# Patient Record
Sex: MALE | Race: WHITE | Employment: OTHER | URBAN - METROPOLITAN AREA
[De-identification: names, ages, dates, MRNs, and addresses within clinical notes are randomized per-mention and may not be internally consistent; named-entity substitution may affect disease eponyms.]

---

## 2017-05-30 ENCOUNTER — HOSPITAL ENCOUNTER (OUTPATIENT)
Dept: MRI IMAGING | Age: 72
Discharge: HOME | End: 2017-05-30
Attending: PSYCHIATRY & NEUROLOGY
Payer: MEDICARE

## 2017-05-30 VITALS — WEIGHT: 240 LBS | BODY MASS INDEX: 30.81 KG/M2

## 2017-05-30 DIAGNOSIS — M48.061 SPINAL STENOSIS, LUMBAR: ICD-10-CM

## 2017-05-30 LAB — CREAT BLD-MCNC: 1.1 MG/DL (ref 0.6–1)

## 2017-05-30 PROCEDURE — 72158 MRI LUMBAR SPINE W/O & W/DYE: CPT

## 2017-05-30 PROCEDURE — 82565 ASSAY OF CREATININE: CPT

## 2017-05-30 PROCEDURE — A9577 INJ MULTIHANCE: HCPCS

## 2017-05-30 PROCEDURE — 74011250636 HC RX REV CODE- 250/636

## 2017-05-30 RX ORDER — SODIUM CHLORIDE 0.9 % (FLUSH) 0.9 %
10 SYRINGE (ML) INJECTION
Status: COMPLETED | OUTPATIENT
Start: 2017-05-30 | End: 2017-05-30

## 2017-05-30 RX ADMIN — Medication 10 ML: at 12:05

## 2017-05-30 RX ADMIN — GADOBENATE DIMEGLUMINE 10 ML: 529 INJECTION, SOLUTION INTRAVENOUS at 12:05

## 2019-02-19 ENCOUNTER — HOSPITAL ENCOUNTER (OUTPATIENT)
Dept: SURGERY | Age: 74
Discharge: HOME | End: 2019-02-19
Payer: MEDICARE

## 2019-02-19 ENCOUNTER — HOSPITAL ENCOUNTER (OUTPATIENT)
Dept: PHYSICAL THERAPY | Age: 74
Discharge: HOME | End: 2019-02-19
Payer: MEDICARE

## 2019-02-19 VITALS
TEMPERATURE: 97.3 F | OXYGEN SATURATION: 96 % | WEIGHT: 240 LBS | HEART RATE: 75 BPM | HEIGHT: 75 IN | BODY MASS INDEX: 29.84 KG/M2 | DIASTOLIC BLOOD PRESSURE: 78 MMHG | SYSTOLIC BLOOD PRESSURE: 153 MMHG | RESPIRATION RATE: 16 BRPM

## 2019-02-19 LAB
ANION GAP SERPL CALC-SCNC: 5 MMOL/L
APPEARANCE UR: NORMAL
APTT PPP: 27.5 SEC (ref 24.7–39.8)
ATRIAL RATE: 69 BPM
BACTERIA SPEC CULT: NORMAL
BASOPHILS # BLD: 0 K/UL (ref 0–0.2)
BASOPHILS NFR BLD: 1 % (ref 0–2)
BILIRUB UR QL: NEGATIVE
BUN SERPL-MCNC: 20 MG/DL (ref 8–23)
CALCIUM SERPL-MCNC: 8.9 MG/DL (ref 8.3–10.4)
CALCULATED P AXIS, ECG09: 65 DEGREES
CALCULATED R AXIS, ECG10: -6 DEGREES
CALCULATED T AXIS, ECG11: 36 DEGREES
CHLORIDE SERPL-SCNC: 104 MMOL/L (ref 98–107)
CO2 SERPL-SCNC: 30 MMOL/L (ref 21–32)
COLOR UR: YELLOW
CREAT SERPL-MCNC: 1.32 MG/DL (ref 0.8–1.5)
DIAGNOSIS, 93000: NORMAL
DIFFERENTIAL METHOD BLD: ABNORMAL
EOSINOPHIL # BLD: 0.1 K/UL (ref 0–0.8)
EOSINOPHIL NFR BLD: 3 % (ref 0.5–7.8)
ERYTHROCYTE [DISTWIDTH] IN BLOOD BY AUTOMATED COUNT: 13.2 % (ref 11.9–14.6)
GLUCOSE SERPL-MCNC: 148 MG/DL (ref 65–100)
GLUCOSE UR STRIP.AUTO-MCNC: NEGATIVE MG/DL
HCT VFR BLD AUTO: 43.5 % (ref 41.1–50.3)
HGB BLD-MCNC: 14.6 G/DL (ref 13.6–17.2)
HGB UR QL STRIP: NEGATIVE
IMM GRANULOCYTES # BLD AUTO: 0 K/UL (ref 0–0.5)
IMM GRANULOCYTES NFR BLD AUTO: 1 % (ref 0–5)
INR PPP: 1
KETONES UR QL STRIP.AUTO: NEGATIVE MG/DL
LEUKOCYTE ESTERASE UR QL STRIP.AUTO: NEGATIVE
LYMPHOCYTES # BLD: 1 K/UL (ref 0.5–4.6)
LYMPHOCYTES NFR BLD: 19 % (ref 13–44)
MCH RBC QN AUTO: 30.2 PG (ref 26.1–32.9)
MCHC RBC AUTO-ENTMCNC: 33.6 G/DL (ref 31.4–35)
MCV RBC AUTO: 90.1 FL (ref 79.6–97.8)
MONOCYTES # BLD: 0.3 K/UL (ref 0.1–1.3)
MONOCYTES NFR BLD: 5 % (ref 4–12)
NEUTS SEG # BLD: 3.8 K/UL (ref 1.7–8.2)
NEUTS SEG NFR BLD: 71 % (ref 43–78)
NITRITE UR QL STRIP.AUTO: NEGATIVE
NRBC # BLD: 0 K/UL (ref 0–0.2)
P-R INTERVAL, ECG05: 192 MS
PH UR STRIP: 6.5 [PH] (ref 5–9)
PLATELET # BLD AUTO: 146 K/UL (ref 150–450)
PMV BLD AUTO: 10.7 FL (ref 9.4–12.3)
POTASSIUM SERPL-SCNC: 4 MMOL/L (ref 3.5–5.1)
PROT UR STRIP-MCNC: NEGATIVE MG/DL
PROTHROMBIN TIME: 13.2 SEC (ref 11.7–14.5)
Q-T INTERVAL, ECG07: 430 MS
QRS DURATION, ECG06: 134 MS
QTC CALCULATION (BEZET), ECG08: 460 MS
RBC # BLD AUTO: 4.83 M/UL (ref 4.23–5.6)
SERVICE CMNT-IMP: NORMAL
SODIUM SERPL-SCNC: 139 MMOL/L (ref 136–145)
SP GR UR REFRACTOMETRY: 1.02 (ref 1–1.02)
UROBILINOGEN UR QL STRIP.AUTO: 0.2 EU/DL (ref 0.2–1)
VENTRICULAR RATE, ECG03: 69 BPM
WBC # BLD AUTO: 5.3 K/UL (ref 4.3–11.1)

## 2019-02-19 PROCEDURE — 85730 THROMBOPLASTIN TIME PARTIAL: CPT

## 2019-02-19 PROCEDURE — 77030027138 HC INCENT SPIROMETER -A

## 2019-02-19 PROCEDURE — 85610 PROTHROMBIN TIME: CPT

## 2019-02-19 PROCEDURE — 93005 ELECTROCARDIOGRAM TRACING: CPT | Performed by: ANESTHESIOLOGY

## 2019-02-19 PROCEDURE — 36415 COLL VENOUS BLD VENIPUNCTURE: CPT

## 2019-02-19 PROCEDURE — 80048 BASIC METABOLIC PNL TOTAL CA: CPT

## 2019-02-19 PROCEDURE — 87641 MR-STAPH DNA AMP PROBE: CPT

## 2019-02-19 PROCEDURE — 81003 URINALYSIS AUTO W/O SCOPE: CPT

## 2019-02-19 PROCEDURE — 85025 COMPLETE CBC W/AUTO DIFF WBC: CPT

## 2019-02-19 PROCEDURE — 97161 PT EVAL LOW COMPLEX 20 MIN: CPT

## 2019-02-19 RX ORDER — CHOLECALCIFEROL TAB 125 MCG (5000 UNIT) 125 MCG
5000 TAB ORAL
COMMUNITY

## 2019-02-19 RX ORDER — DOCUSATE SODIUM 100 MG/1
100 CAPSULE, LIQUID FILLED ORAL DAILY
COMMUNITY

## 2019-02-19 RX ORDER — DICLOFENAC SODIUM 75 MG/1
75 TABLET, DELAYED RELEASE ORAL DAILY
COMMUNITY
End: 2019-03-08

## 2019-02-19 RX ORDER — CETIRIZINE HCL 10 MG
10 TABLET ORAL
COMMUNITY
End: 2019-05-24 | Stop reason: CLARIF

## 2019-02-19 NOTE — PERIOP NOTES
Patient verified name and . Order for consent was found in EHR and matches case posting; patient verified. right total knee arthroplasty Type 3 surgery, PAT Joint assessment complete. Labs per surgeon: cbc, bmp, UA, PT, PTT, MRSA nasal swab; results pending. Labs per anesthesia protocol: no additional lab work needed. EKG:Done today- within anesthesia guidelines. MRSA/MSSA swab collected; pharmacy to review and dose antibiotic as appropriate. Hibiclens and instructions to return bottle on DOS given per hospital policy. Patient provided with handouts including Guide to Surgery, Pain Management, Hand Hygiene, Blood Transfusion Education, and [facility] Anesthesia Brochure. Patient answered medical/surgical history questions at their best of ability. All prior to admission medications documented in [facility]. Original medication prescription bottle was visualized during patient appointment. Patient instructed to hold all vitamins 7 days prior to surgery and NSAIDS 5 days prior to surgery. Prescription medications to hold: diclofenac. Patient instructed to continue previous medications as prescribed prior to surgery and to take the following medications the day of surgery according to anesthesia guidelines with a small sip of water: flomax. Patient teach back successful and patient demonstrates knowledge of instruction.

## 2019-02-19 NOTE — PROGRESS NOTES
02/19/19 1200 Oxygen Therapy O2 Sat (%) 97 % Pulse via Oximetry 102 beats per minute O2 Device Room air Pre-Treatment Breath Sounds Bilateral Clear Pre FEV1 (liters) 3.1 liters % Predicted 83 Incentive Spirometry Treatment Actual Volume (ml) 2500 ml Initial respiratory Assessment completed with pt. Pt was interviewed and evaluated in Joint camp prior to surgery. Patient ID: 
Kandi Reina [id number] 
68 y.o. 
1945 Surgeon: Dr. Kennedy Elizabeth Date of Surgery: 3/6/2019 Procedure: Total Right Knee Arthroplasty Primary Care Physician: Ava Mckay [phone] Specialists:  
                    
          Pt instructed in the use of Incentive Spirometry. Pt instructed to bring Incentive Spirometer back on date of surgery & to start using Is upon return to pt room. Pt taught proper cough technique History of smoking:   NONE Quit date:        
 
Secondhand smoke:NONE Past procedures with Oxygen desaturation:DENIES Past Medical History:  
Diagnosis Date  Arthritis  Avascular necrosis ([address] right hip  BPH (benign prostatic hyperplasia)   
 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  
 Skin cancer   
 multiple bcc and scc removed  Snores Respiratory history:DENIES SOB Respiratory meds:  NA 
 
 
                                
FAMILY PRESENT:              
                                            NO 
 
                                   
 PAST SLEEP STUDY:                 DENIES 
HX OF GENEVIEVE:                                       DENIES 
                                
 
GENEVIEVE assessment: SLEEPS ON SIDE PHYSICAL EXAM Body mass index is 30 kg/m². Visit Vitals /78 (BP 1 Location: Right arm, BP Patient Position: At rest;Sitting) Pulse 75 Temp 97.3 °F (36.3 °C) Resp 16 Ht 6' 3\" (1.905 m) Wt 108.9 kg (240 lb) SpO2 96% BMI 30.00 kg/m² Neck circumference:  43.5    cm Loud snoring:        YES Witnessed apnea or wakening gasping or choking:,             DENIES, Awakens with headaches:                                                  DENIES Morning or daytime tiredness/ sleepiness:                    DENIES - NAPS 30 MIN TO 1 HOUR Dry mouth or sore throat in morning:              SOME Sharma stage:  4 SACS score:25 STOP/BAN 
 
                
Sleepiness Scale:  
 
Sitting and reading 0 Watching TV 1 Sitting inactive in a public place 0 As a passenger in a car for an hour without a break 0 Lying down to rest in the afternoon when circumstances Permits 1 Sitting and talking to someone 0 Sitting quietly after lunch without alcohol 1 In a car, while stopped for a few minutes 0 Total :  3 
          
CPAP:                       NONE 
 
 
 
 
 
     CONT SAT HS Referrals: 
 
Pt. Phone Number:

## 2019-02-19 NOTE — PERIOP NOTES
Lab results within anesthesia guidelines. Lab results sent to PCP per surgeon's request.  
 
 
Recent Results (from the past 12 hour(s)) CBC WITH AUTOMATED DIFF Collection Time: 02/19/19 11:55 AM  
Result Value Ref Range WBC 5.3 4.3 - 11.1 K/uL  
 RBC 4.83 4.23 - 5.6 M/uL  
 HGB 14.6 13.6 - 17.2 g/dL HCT 43.5 41.1 - 50.3 % MCV 90.1 79.6 - 97.8 FL  
 MCH 30.2 26.1 - 32.9 PG  
 MCHC 33.6 31.4 - 35.0 g/dL  
 RDW 13.2 11.9 - 14.6 % PLATELET 269 (L) 644 - 450 K/uL MPV 10.7 9.4 - 12.3 FL ABSOLUTE NRBC 0.00 0.0 - 0.2 K/uL  
 DF AUTOMATED NEUTROPHILS 71 43 - 78 % LYMPHOCYTES 19 13 - 44 % MONOCYTES 5 4.0 - 12.0 % EOSINOPHILS 3 0.5 - 7.8 % BASOPHILS 1 0.0 - 2.0 % IMMATURE GRANULOCYTES 1 0.0 - 5.0 %  
 ABS. NEUTROPHILS 3.8 1.7 - 8.2 K/UL  
 ABS. LYMPHOCYTES 1.0 0.5 - 4.6 K/UL  
 ABS. MONOCYTES 0.3 0.1 - 1.3 K/UL  
 ABS. EOSINOPHILS 0.1 0.0 - 0.8 K/UL  
 ABS. BASOPHILS 0.0 0.0 - 0.2 K/UL  
 ABS. IMM. GRANS. 0.0 0.0 - 0.5 K/UL METABOLIC PANEL, BASIC Collection Time: 02/19/19 11:55 AM  
Result Value Ref Range Sodium 139 136 - 145 mmol/L Potassium 4.0 3.5 - 5.1 mmol/L Chloride 104 98 - 107 mmol/L  
 CO2 30 21 - 32 mmol/L Anion gap 5 mmol/L Glucose 148 (H) 65 - 100 mg/dL BUN 20 8 - 23 MG/DL Creatinine 1.32 0.8 - 1.5 MG/DL  
 GFR est AA >60 >60 ml/min/1.73m2 GFR est non-AA 56 ml/min/1.73m2 Calcium 8.9 8.3 - 10.4 MG/DL PROTHROMBIN TIME + INR Collection Time: 02/19/19 11:55 AM  
Result Value Ref Range Prothrombin time 13.2 11.7 - 14.5 sec INR 1.0    
PTT Collection Time: 02/19/19 11:55 AM  
Result Value Ref Range aPTT 27.5 24.7 - 39.8 SEC URINALYSIS W/ RFLX MICROSCOPIC Collection Time: 02/19/19 11:55 AM  
Result Value Ref Range Color YELLOW Appearance CLOUDY Specific gravity 1.017 1.001 - 1.023    
 pH (UA) 6.5 5.0 - 9.0 Protein NEGATIVE  NEG mg/dL Glucose NEGATIVE  mg/dL Ketone NEGATIVE  NEG mg/dL Bilirubin NEGATIVE  NEG Blood NEGATIVE  NEG Urobilinogen 0.2 0.2 - 1.0 EU/dL Nitrites NEGATIVE  NEG Leukocyte Esterase NEGATIVE  NEG    
EKG, 12 LEAD, INITIAL Collection Time: 02/19/19  2:28 PM  
Result Value Ref Range Ventricular Rate 69 BPM  
 Atrial Rate 69 BPM  
 P-R Interval 192 ms QRS Duration 134 ms Q-T Interval 430 ms QTC Calculation (Bezet) 460 ms Calculated P Axis 65 degrees Calculated R Axis -6 degrees Calculated T Axis 36 degrees Diagnosis Normal sinus rhythm Right bundle branch block Abnormal ECG No previous ECGs available

## 2019-02-19 NOTE — PROGRESS NOTES
Liza Francis : (67 y.o.) [address] at [facility] [address], [address] [phone] Physical Therapy Prehab Plan of Treatment and Evaluation Summary:2019 ICD-10: Treatment Diagnosis:  
· Pain in Right Knee (M25.561) · Stiffness of Right Knee, Not elsewhere classified (M25.661) · Difficulty in walking, Not elsewhere classified (R26.2) Precautions/Allergies:  
Adhesive  MEDICAL/REFERRING DIAGNOSIS: 
Unilateral primary osteoarthritis, right knee [M17.11] REFERRING PHYSICIAN: Betty Bullock., * DATE OF SURGERY: 3/6/19 Assessment:  
Comments:  Patient presents prior to R TKA. Patient is needing a L TKA as well. He has had a R JEREMIAS. Patient ambulates with a cane. He notes pain only with standing/walking but none with sitting or sleeping. Patient reports Dr. Tatianna Drake would like for him to go home and he is ok with going home but his spouse suggested he go to a SNF. Patient reports he will go to a SNF if his spouse asks him to. He was given a list of facilities and explained he could cancel plans if he and his spouse felt comfortable. PROBLEM LIST (Impacting functional limitations): Mr. Gurvinder Gunn presents with the following right lower extremity(s) problems: 
1. Gait 2. Strength 3. Range of Motion 4. Balance 5. Home Exercise Program 
6. Pain INTERVENTIONS PLANNED: 
1. Home Exercise Program 
2. Educational Discussion TREATMENT PLAN: Effective Dates: 2019 TO 2019. Frequency/Duration: Patient to continue to perform home exercise program at least twice per day up until his surgery. GOALS: (Goals have been discussed and agreed upon with patient.) Discharge Goals: Time Frame: 1 Day 1.  Patient will demonstrate independence with a home exercise program designed to increase strength, range of motion, balance, coordination and pain control to minimize functional deficits and optimize patient for total joint replacement. Rehabilitation Potential For Stated Goals: Good Regarding Alem Yuan's therapy, I certify that the treatment plan above will be carried out by a therapist or under their direction. Thank you for this referral, 
Deon Rodriguez, PT     
    
 
 
HISTORY:  
Present Symptoms: 
Pain Intensity 1: 0(sitting; 8/10 with walking) History of Present Injury/Illness (Reason for Referral): 
Medical/Referring Diagnosis: Unilateral primary osteoarthritis, right knee [M17.11] Past Medical History/Comorbidities: Mr. Cheryl Christianson  has a past medical history of Arthritis, Avascular necrosis ([facility] [address] BPH (benign prostatic hyperplasia), Hypertension, Impotence of organic origin, Nocturia, Osteoarthritis of hip (10/19/2011), S/P prosthetic total arthroplasty of the hip (10/19/2011), and Skin cancer. Mr. Cheryl Christianson  has a past surgical history that includes hx tonsillectomy; hx other surgical (2002); hx hip replacement (10/2011); and hx other surgical. 
Social History/Living Environment:  
Home Environment: Private residence # Steps to Enter: 1 Rails to Enter: No 
One/Two Story Residence: One story Living Alone: No 
Support Systems: Spouse/Significant Other/Partner Patient Expects to be Discharged to[de-identified] (unknown-SNF vs home) Current DME Used/Available at Home: Cane, straight, Raised toilet seat, Shower chair, Walker, rolling Tub or Shower Type: Shower Work/Activity: 
Patient is retired. Ambulates with a cane. Dominant Side: LEFT Current Medications:  See Pre-assessment nursing note Number of Personal Factors/Comorbidities that affect the Plan of Care: 1-2: MODERATE COMPLEXITY EXAMINATION:  
ADLs (Current Functional Status):  
Ambulation: 
[] Independent 
[] Walk Indoors Only 
[] Walk Outdoors [x] Use Assistive Device [] Use Wheelchair Only Dressing: 
[x] Independent Requires Assistance from Someone for: 
[] Sock/Shoes 
[] Pants 
[] Everything Bathing/Showering:  
[x] Independent [] Requires Assistance from Someone 
[] Sponge Bath Only Household Activities: 
[] Routine house and yard work [x] Light Housework Only 
[] None Observation/Orthostatic Postural Assessment:  
Exceptions to WDLTrunk flexion ROM/Flexibility:  
Gross Assessment: Yes AROM: Generally decreased, functional(L LE) RLE Assessment RLE Assessment (WDL): Exceptions to WDL 
RLE AROM 
R Knee Flexion: 114 R Knee Extension: 5 Strength:  
Gross Assessment: Yes 
Strength: Generally decreased, functional(L LE (4/5 hip; 3- knee extension)) RLE Strength 
R Hip Flexion: 4+ 
R Knee Flexion: 4 
R Knee Extension: 3-  
Functional Mobility:   
Gross Assessment: Yes Coordination: Within functional limits Gait Description (WDL): Exceptions to [facility] Stand to Sit: Modified independent Sit to Stand: Modified independent Distance (ft): 500 Feet (ft) Ambulation - Level of Assistance: Modified independent Assistive Device: Cane, straight Base of Support: Center of gravity altered Speed/Jennie: Slow Step Length: Left shortened;Right shortened Gait Abnormalities: Antalgic Balance:   
Sitting: Intact Standing: Impaired Standing - Static: Good Standing - Dynamic : Fair Body Structures Involved: 1. Joints 2. Muscles Body Functions Affected: 1. Movement Related Activities and Participation Affected: 1. Mobility Number of elements that affect the Plan of Care: 4+: HIGH COMPLEXITY CLINICAL PRESENTATION:  
Presentation: Stable and uncomplicated: LOW COMPLEXITY CLINICAL DECISION MAKING:  
Outcome Measure: Tool Used: Lower Extremity Functional Scale (LEFS) Score:  Initial: 26/80 Most Recent: X/80 (Date: -- ) Interpretation of Score: 20 questions each scored on a 5 point scale with 0 representing \"extreme difficulty or unable to perform\" and 4 representing \"no difficulty\".   The lower the score, the greater the functional disability. 80/80 represents no disability. Minimal detectable change is 9 points. Medical Necessity:  
· Mr. Rupali Landaverde is expected to optimize his lower extremity strength and ROM in preparation for joint replacement surgery. Reason for Services/Other Comments: · Achieve baseline assesment of musculoskeletal system, functional mobility and home environment. , educate in PT HEP in preparation for surgery, educate in hospital plan of care. Use of outcome tool(s) and clinical judgement create a POC that gives a: Clear prediction of patient's progress: LOW COMPLEXITY  
TREATMENT:  
Treatment/Session Assessment:  Patient was instructed in PT- HEP to increase strength and ROM in LEs. Answered all questions. · Post session pain:  0/10 · Compliance with Program/Exercises: Will assess as treatment progresses. Total Treatment Duration: PT Patient Time In/Time Out Time In: 1130 Time Out: 1145 Munira Saini PT

## 2019-02-20 NOTE — ADVANCED PRACTICE NURSE
Total Joint Surgery Preoperative Chart Review Patient ID: 
Pan Trivedi [id number] 
17 y.o. 
1945 Surgeon: Dr. Dina Sullivan Date of Surgery: 3/6/2019 Procedure: Total Right Knee Arthroplasty Primary Care Physician: Criselda Sykes [phone] Specialty Physician(s):   
 
Subjective:  
Pan Trivedi is a 75 y.o. WHITE OR  male who presents for preoperative evaluation for Total Right Knee arthroplasty. This is a preoperative chart review note based on data collected by the nurse at the surgical Pre-Assessment visit. Past Medical History:  
Diagnosis Date  Arthritis  Avascular necrosis ([address] right hip  BPH (benign prostatic hyperplasia)   
 medication  CMT (Charcot-Leslie-Tooth disease) Followed by Dr. Richard St- neurology  Hypertension   
 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  
 Skin cancer   
 multiple bcc and scc removed  Snores Past Surgical History:  
Procedure Laterality Date  HX BACK SURGERY Lumbar laminectomy- no hardware  HX HIP REPLACEMENT Right 10/2011  
 total  
 HX OTHER SURGICAL  2002  
 skin cancer removed from lip, plastic surgery afterward  HX OTHER SURGICAL    
 skin cancer from the ear,left leg and behind the area  HX TONSILLECTOMY Family History Problem Relation Age of Onset [facility] Arthritis-osteo Mother  Cancer Father  Diabetes Father  Hypertension Other   
     gen fam hx Social History Tobacco Use  Smoking status: Never Smoker  Smokeless tobacco: Never Used Substance Use Topics  Alcohol use: No  
   
Prior to Admission medications Medication Sig Start Date End Date Taking?  Authorizing Provider  
cholecalciferol, VITAMIN D3, (VITAMIN D3) 5,000 unit tab tablet Take 5,000 Units by mouth every seven (7) days. Yes Provider, Historical  
docusate sodium (STOOL SOFTENER) 100 mg capsule Take 100 mg by mouth daily. Yes Provider, Historical  
diclofenac EC (VOLTAREN) 75 mg EC tablet Take 75 mg by mouth daily. Yes Provider, Historical  
cetirizine (ZYRTEC) 10 mg tablet Take 10 mg by mouth nightly. Yes Provider, Historical  
tamsulosin (FLOMAX) 0.4 mg capsule Take 0.4 mg by mouth daily. Yes Provider, Historical  
tadalafil (CIALIS) 5 mg tablet Take 1 Tab by mouth daily. Patient taking differently: Take 5 mg by mouth nightly. 1/22/19  Yes Abida Burrows MD  
finasteride (PROSCAR) 5 mg tablet Take 1 Tab by mouth daily for 90 days. Patient taking differently: Take 5 mg by mouth nightly. 1/22/19 4/22/19 Yes Abida Burrows MD  
finasteride (PROSCAR) 5 mg tablet Take 1 Tab by mouth daily for 90 days. Patient taking differently: Take 5 mg by mouth nightly. 1/22/19 4/22/19 Yes Abida Burrows MD  
terazosin (HYTRIN) 10 mg capsule Take 10 mg by mouth nightly. Yes Provider, Historical  
hydrochlorothiazide (HYDRODIURIL) 25 mg tablet Take 25 mg by mouth daily. Yes Provider, Historical  
tadalafil (CIALIS) 5 mg tablet Take 1 Tab by mouth daily. Patient taking differently: Take 5 mg by mouth nightly. 11/9/16   Franco López MD  
 
Allergies Allergen Reactions  Adhesive Rash Objective:  
 
Physical Exam:  
/78 (BP 1 Location: Right arm, BP Patient Position: At rest;Sitting) Pulse 75 Temp 97.3 °F (36.3 °C) Resp 16 Ht 6' 3\" (1.905 m) Wt 108.9 kg (240 lb) SpO2 96% BMI 30.00 kg/m²  
  
Neck circumference:  43.5 ECG:   
EKG Results Procedure 720 Value Units Date/Time EKG, 12 LEAD, INITIAL [[id number]] Collected:  02/19/19 1428 Order Status:  Completed Updated:  02/19/19 2732 Ventricular Rate 69 BPM   
  Atrial Rate 69 BPM   
  P-R Interval 192 ms QRS Duration 134 ms Q-T Interval 430 ms QTC Calculation (Bezet) 460 ms Calculated P Axis 65 degrees Calculated R Axis -6 degrees Calculated T Axis 36 degrees Diagnosis --  
  Normal sinus rhythm Right bundle branch block Abnormal ECG No previous ECGs available Confirmed by Yvan Montoya MD (), [id number] ([id number]) on 2/19/2019 5:12:38 PM 
  
  
 
 
Data Review:  
Labs:  
Results for Solange Patino \"KATH\" (MRN [id number]) as of 2/20/2019 15:46 Ref. Range 2/19/2019 11:55 Sodium Latest Ref Range: 136 - 145 mmol/L 139 Potassium Latest Ref Range: 3.5 - 5.1 mmol/L 4.0 Chloride Latest Ref Range: 98 - 107 mmol/L 104 CO2 Latest Ref Range: 21 - 32 mmol/L 30 Anion gap Latest Units: mmol/L 5 Glucose Latest Ref Range: 65 - 100 mg/dL 148 (H) BUN Latest Ref Range: 8 - 23 MG/DL 20 Creatinine Latest Ref Range: 0.8 - 1.5 MG/DL 1.32 Calcium Latest Ref Range: 8.3 - 10.4 MG/DL 8.9 GFR est non-AA Latest Units: ml/min/1.73m2 56 GFR est AA Latest Ref Range: >60 ml/min/1.73m2 >60 Problem List: 
) Patient Active Problem List  
Diagnosis Code  Osteoarthritis of hip M16.9  
 S/P prosthetic total arthroplasty of the hip Z96.649  
 HTN (hypertension) I10  
 BPH (benign prostatic hypertrophy) N40.0 Total Joint Surgery Pre-Assessment Recommendations:   
He/she is a moderate risk for sleep apnea but is not interested in additional work up at this time. Will initiate perioperative GENEVIEVE precautions. Recommend continuous saturation monitoring hours of sleep, during hospitalization. Signed By: Tarun Serrano NP-C February 20, 2019

## 2019-03-05 ENCOUNTER — ANESTHESIA EVENT (OUTPATIENT)
Dept: SURGERY | Age: 74
DRG: 470 | End: 2019-03-05
Payer: MEDICARE

## 2019-03-05 NOTE — H&P
[id number] [facility]  Pre Operative History and Physical Exam    Patient ID:  Tayla Hanna  [id number]  3 y.o.  1945    Today: March 5, 2019       Assessment:   1. Arthritis of the right knee        Plan:    1. Proceed with scheduled Procedure(s) (LRB):  KNEE ARTHROPLASTY TOTAL/ RIGHT/ CHRISTOPHER/ FNB (Right)            CC:  Right knee pain    HPI:   The patient has end stage arthritis of the right knee. The patient was evaluated and examined during a consultation prior to this office visit. There have been no changes to the patient's orthopedic condition since the initial consultation. The patient has failed previous conservative treatment for this condition including antiinflammatories , and lifestyle modifications. The necessity for joint replacement is present. The patient will be admitted the day of surgery for Procedure(s) (LRB):  KNEE ARTHROPLASTY TOTAL/ RIGHT/ CHRISTOPHER/ FNB (Right)      Past Medical/Surgical History:  Past Medical History:   Diagnosis Date    Arthritis     Avascular necrosis ([address]     right hip    BPH (benign prostatic hyperplasia)     medication    CMT (Charcot-Leslie-Tooth disease)     Followed by Dr. Lily Esparza- neurology     Hypertension     controlled with medication    Impotence of organic origin     Nocturia     Osteoarthritis of hip 10/19/2011    S/P prosthetic total arthroplasty of the hip 10/19/2011    Skin cancer     multiple bcc and scc removed     Snores      Past Surgical History:   Procedure Laterality Date    HX BACK SURGERY      Lumbar laminectomy- no hardware     HX HIP REPLACEMENT Right 10/2011    total    HX OTHER SURGICAL  2002    skin cancer removed from lip, plastic surgery afterward    HX OTHER SURGICAL      skin cancer from the ear,left leg and behind the area    HX TONSILLECTOMY          Allergies:    Allergies   Allergen Reactions    Adhesive Rash        Physical Exam:   General: NAD, Alert, Oriented, Appears their stated age HEENT: NC/AT, PERRL    Skin: No rashes, lesions or wounds seen      Psych: normal affect      Heart: Regular Rate, Rhythm     Lungs: unlabored respirations, normal breath sounds     Abdomen: Soft and non-distended     Ortho: Pain with limited ROM of the right knee    Neuro: no focal defects, sensation is equal bilaterally     Lymph: no lymphadenopathy     Meds:   No current facility-administered medications for this encounter. Current Outpatient Medications   Medication Sig    cholecalciferol, VITAMIN D3, (VITAMIN D3) 5,000 unit tab tablet Take 5,000 Units by mouth every seven (7) days.  docusate sodium (STOOL SOFTENER) 100 mg capsule Take 100 mg by mouth daily.  diclofenac EC (VOLTAREN) 75 mg EC tablet Take 75 mg by mouth daily.  cetirizine (ZYRTEC) 10 mg tablet Take 10 mg by mouth nightly.  tamsulosin (FLOMAX) 0.4 mg capsule Take 0.4 mg by mouth daily.  tadalafil (CIALIS) 5 mg tablet Take 1 Tab by mouth daily. (Patient taking differently: Take 5 mg by mouth nightly.)    finasteride (PROSCAR) 5 mg tablet Take 1 Tab by mouth daily for 90 days. (Patient taking differently: Take 5 mg by mouth nightly.)    finasteride (PROSCAR) 5 mg tablet Take 1 Tab by mouth daily for 90 days. (Patient taking differently: Take 5 mg by mouth nightly.)    tadalafil (CIALIS) 5 mg tablet Take 1 Tab by mouth daily. (Patient taking differently: Take 5 mg by mouth nightly.)    terazosin (HYTRIN) 10 mg capsule Take 10 mg by mouth nightly.  hydrochlorothiazide (HYDRODIURIL) 25 mg tablet Take 25 mg by mouth daily.          Labs:  Hospital Outpatient Visit on 02/19/2019   Component Date Value Ref Range Status    WBC 02/19/2019 5.3  4.3 - 11.1 K/uL Final    RBC 02/19/2019 4.83  4.23 - 5.6 M/uL Final    HGB 02/19/2019 14.6  13.6 - 17.2 g/dL Final    HCT 02/19/2019 43.5  41.1 - 50.3 % Final    MCV 02/19/2019 90.1  79.6 - 97.8 FL Final    MCH 02/19/2019 30.2  26.1 - 32.9 PG Final    MCHC 02/19/2019 33.6  31.4 - 35.0 g/dL Final    RDW 02/19/2019 13.2  11.9 - 14.6 % Final    PLATELET 84/52/2891 819* 150 - 450 K/uL Final    MPV 02/19/2019 10.7  9.4 - 12.3 FL Final    ABSOLUTE NRBC 02/19/2019 0.00  0.0 - 0.2 K/uL Final    **Note: Absolute NRBC parameter is now reported with Hemogram**    DF 02/19/2019 AUTOMATED    Final    NEUTROPHILS 02/19/2019 71  43 - 78 % Final    LYMPHOCYTES 02/19/2019 19  13 - 44 % Final    MONOCYTES 02/19/2019 5  4.0 - 12.0 % Final    EOSINOPHILS 02/19/2019 3  0.5 - 7.8 % Final    BASOPHILS 02/19/2019 1  0.0 - 2.0 % Final    IMMATURE GRANULOCYTES 02/19/2019 1  0.0 - 5.0 % Final    ABS. NEUTROPHILS 02/19/2019 3.8  1.7 - 8.2 K/UL Final    ABS. LYMPHOCYTES 02/19/2019 1.0  0.5 - 4.6 K/UL Final    ABS. MONOCYTES 02/19/2019 0.3  0.1 - 1.3 K/UL Final    ABS. EOSINOPHILS 02/19/2019 0.1  0.0 - 0.8 K/UL Final    ABS. BASOPHILS 02/19/2019 0.0  0.0 - 0.2 K/UL Final    ABS. IMM. GRANS. 02/19/2019 0.0  0.0 - 0.5 K/UL Final    Sodium 02/19/2019 139  136 - 145 mmol/L Final    Potassium 02/19/2019 4.0  3.5 - 5.1 mmol/L Final    Chloride 02/19/2019 104  98 - 107 mmol/L Final    CO2 02/19/2019 30  21 - 32 mmol/L Final    Anion gap 02/19/2019 5  mmol/L Final    Glucose 02/19/2019 148* 65 - 100 mg/dL Final    Comment: 47 - 60 mg/dl Consistent with, but not fully diagnostic of hypoglycemia. 101 - 125 mg/dl Impaired fasting glucose/consistent with pre-diabetes mellitus  > 126 mg/dl Fasting glucose consistent with overt diabetes mellitus      BUN 02/19/2019 20  8 - 23 MG/DL Final    Creatinine 02/19/2019 1.32  0.8 - 1.5 MG/DL Final    GFR est AA 02/19/2019 >60  >60 ml/min/1.73m2 Final    GFR est non-AA 02/19/2019 56  ml/min/1.73m2 Final    Comment: (NOTE)  Estimated GFR is calculated using the Modification of Diet in Renal   Disease (MDRD) Study equation, reported for both  Americans   (GFRAA) and non- Americans (GFRNA), and normalized to 1.73m2   body surface area.  The physician must decide which value applies to   the patient. The MDRD study equation should only be used in   individuals age 25 or older. It has not been validated for the   following: pregnant women, patients with serious comorbid conditions,   or on certain medications, or persons with extremes of body size,   muscle mass, or nutritional status.  Calcium 02/19/2019 8.9  8.3 - 10.4 MG/DL Final    Special Requests: 02/19/2019 NO SPECIAL REQUESTS    Final    Culture result: 02/19/2019 SA target not detected. A MRSA NEGATIVE, SA NEGATIVE test result does not preclude MRSA or SA nasal colonization.     Final    Prothrombin time 02/19/2019 13.2  11.7 - 14.5 sec Final    INR 02/19/2019 1.0    Final    Comment: Suggested therapeutic INR range:  Venous thrombosis and embolus  2.0-3.0  Prosthetic heart valve         2.5-3.5  ** Note new reference range and method **      aPTT 02/19/2019 27.5  24.7 - 39.8 SEC Final    Comment: Heparin Therapeutic Range = 74 - 123 seconds  In addition to factor deficiency, monitoring heparin therapy, etc., evaluation of a prolonged aPTT result should include consideration of preanalytic variables such as heparin flush contamination, specimen integrity issues, etc.      Color 02/19/2019 YELLOW    Final    Appearance 02/19/2019 CLOUDY    Final    Specific gravity 02/19/2019 1.017  1.001 - 1.023   Final    pH (UA) 02/19/2019 6.5  5.0 - 9.0   Final    Protein 02/19/2019 NEGATIVE   NEG mg/dL Final    Glucose 02/19/2019 NEGATIVE   mg/dL Final    Ketone 02/19/2019 NEGATIVE   NEG mg/dL Final    Bilirubin 02/19/2019 NEGATIVE   NEG   Final    Blood 02/19/2019 NEGATIVE   NEG   Final    Urobilinogen 02/19/2019 0.2  0.2 - 1.0 EU/dL Final    Nitrites 02/19/2019 NEGATIVE   NEG   Final    Leukocyte Esterase 02/19/2019 NEGATIVE   NEG   Final    Ventricular Rate 02/19/2019 69  BPM Final    Atrial Rate 02/19/2019 69  BPM Final    P-R Interval 02/19/2019 192  ms Final    QRS Duration 02/19/2019 134  ms Final    Q-T Interval 02/19/2019 430  ms Final    QTC Calculation (Bezet) 02/19/2019 460  ms Final    Calculated P Axis 02/19/2019 65  degrees Final    Calculated R Axis 02/19/2019 -6  degrees Final    Calculated T Axis 02/19/2019 36  degrees Final    Diagnosis 02/19/2019    Final                    Value:Normal sinus rhythm  Right bundle branch block  Abnormal ECG  No previous ECGs available  Confirmed by Jonatan Washburn MD (), BOY LOZADA ([id number]) on 2/19/2019 5:12:38 PM     Office Visit on 01/22/2019   Component Date Value Ref Range Status    Glucose (UA POC) 01/22/2019 Negative  Negative mg/dL Final    Bilirubin (UA POC) 01/22/2019 Negative  Negative Final    Ketones (UA POC) 01/22/2019 Trace  Negative Final    Specific gravity (UA POC) 01/22/2019 1.025  1.001 - 1.035 Final    Blood (UA POC) 01/22/2019 Negative  Negative Final    pH (UA POC) 01/22/2019 6.0  4.6 - 8.0 Final    Protein (UA POC) 01/22/2019 Trace  Negative Final    Urobilinogen (POC) 01/22/2019 0.2 mg/dL   Final    Nitrites (UA POC) 01/22/2019 Negative  Negative Final    Leukocyte esterase (UA POC) 01/22/2019 Negative  Negative Final                 Patient Active Problem List   Diagnosis Code    Osteoarthritis of hip M16.9    S/P prosthetic total arthroplasty of the hip Z96.649    HTN (hypertension) I10    BPH (benign prostatic hypertrophy) N40.0         Signed By: FRANSISCO Crisostomo  March 5, 2019

## 2019-03-06 ENCOUNTER — HOSPITAL ENCOUNTER (INPATIENT)
Age: 74
LOS: 2 days | Discharge: HOME HEALTH | DRG: 470 | End: 2019-03-08
Attending: ORTHOPAEDIC SURGERY | Admitting: ORTHOPAEDIC SURGERY
Payer: MEDICARE

## 2019-03-06 ENCOUNTER — ANESTHESIA (OUTPATIENT)
Dept: SURGERY | Age: 74
DRG: 470 | End: 2019-03-06
Payer: MEDICARE

## 2019-03-06 ENCOUNTER — HOME HEALTH ADMISSION (OUTPATIENT)
Dept: HOME HEALTH SERVICES | Facility: HOME HEALTH | Age: 74
End: 2019-03-06
Payer: MEDICARE

## 2019-03-06 DIAGNOSIS — I10 ESSENTIAL HYPERTENSION: ICD-10-CM

## 2019-03-06 DIAGNOSIS — M17.11 PRIMARY OSTEOARTHRITIS OF RIGHT KNEE: ICD-10-CM

## 2019-03-06 DIAGNOSIS — Z96.651 STATUS POST TOTAL KNEE REPLACEMENT, RIGHT: Primary | ICD-10-CM

## 2019-03-06 PROBLEM — M19.90 OSTEOARTHRITIS: Status: ACTIVE | Noted: 2019-03-06

## 2019-03-06 LAB
ABO + RH BLD: NORMAL
BLOOD GROUP ANTIBODIES SERPL: NORMAL
GLUCOSE BLD STRIP.AUTO-MCNC: 125 MG/DL (ref 65–100)
HGB BLD-MCNC: 12.9 G/DL (ref 13.6–17.2)
SPECIMEN EXP DATE BLD: NORMAL

## 2019-03-06 PROCEDURE — 76010010054 HC POST OP PAIN BLOCK: Performed by: ORTHOPAEDIC SURGERY

## 2019-03-06 PROCEDURE — 77030002966 HC SUT PDS J&J -A: Performed by: ORTHOPAEDIC SURGERY

## 2019-03-06 PROCEDURE — 74011000258 HC RX REV CODE- 258: Performed by: ORTHOPAEDIC SURGERY

## 2019-03-06 PROCEDURE — 82962 GLUCOSE BLOOD TEST: CPT

## 2019-03-06 PROCEDURE — 77030003665 HC NDL SPN BBMI -A: Performed by: ANESTHESIOLOGY

## 2019-03-06 PROCEDURE — 74011250636 HC RX REV CODE- 250/636: Performed by: ANESTHESIOLOGY

## 2019-03-06 PROCEDURE — 77030037364 HC TIB INST CR  DISP STRY -C: Performed by: ORTHOPAEDIC SURGERY

## 2019-03-06 PROCEDURE — 65270000029 HC RM PRIVATE

## 2019-03-06 PROCEDURE — 77030003602 HC NDL NRV BLK BBMI -B: Performed by: ANESTHESIOLOGY

## 2019-03-06 PROCEDURE — 86900 BLOOD TYPING SEROLOGIC ABO: CPT

## 2019-03-06 PROCEDURE — 0SRC0JA REPLACEMENT OF RIGHT KNEE JOINT WITH SYNTHETIC SUBSTITUTE, UNCEMENTED, OPEN APPROACH: ICD-10-PCS | Performed by: ORTHOPAEDIC SURGERY

## 2019-03-06 PROCEDURE — 74011000250 HC RX REV CODE- 250: Performed by: ORTHOPAEDIC SURGERY

## 2019-03-06 PROCEDURE — 77030035236 HC SUT PDS STRATFX BARB J&J -B: Performed by: ORTHOPAEDIC SURGERY

## 2019-03-06 PROCEDURE — 76010000171 HC OR TIME 2 TO 2.5 HR INTENSV-TIER 1: Performed by: ORTHOPAEDIC SURGERY

## 2019-03-06 PROCEDURE — 74011000250 HC RX REV CODE- 250

## 2019-03-06 PROCEDURE — 77030007880 HC KT SPN EPDRL BBMI -B: Performed by: ANESTHESIOLOGY

## 2019-03-06 PROCEDURE — 74011250636 HC RX REV CODE- 250/636: Performed by: ORTHOPAEDIC SURGERY

## 2019-03-06 PROCEDURE — 76060000035 HC ANESTHESIA 2 TO 2.5 HR: Performed by: ORTHOPAEDIC SURGERY

## 2019-03-06 PROCEDURE — 77030032490 HC SLV COMPR SCD KNE COVD -B

## 2019-03-06 PROCEDURE — 77030019557 HC ELECTRD VES SEAL MEDT -F: Performed by: ORTHOPAEDIC SURGERY

## 2019-03-06 PROCEDURE — 77030006720 HC BLD PAT RMR ZIMM -B: Performed by: ORTHOPAEDIC SURGERY

## 2019-03-06 PROCEDURE — C1776 JOINT DEVICE (IMPLANTABLE): HCPCS | Performed by: ORTHOPAEDIC SURGERY

## 2019-03-06 PROCEDURE — 94762 N-INVAS EAR/PLS OXIMTRY CONT: CPT

## 2019-03-06 PROCEDURE — 77030018673: Performed by: ORTHOPAEDIC SURGERY

## 2019-03-06 PROCEDURE — 77030008467 HC STPLR SKN COVD -B: Performed by: ORTHOPAEDIC SURGERY

## 2019-03-06 PROCEDURE — 74011250636 HC RX REV CODE- 250/636: Performed by: PHYSICIAN ASSISTANT

## 2019-03-06 PROCEDURE — 36415 COLL VENOUS BLD VENIPUNCTURE: CPT

## 2019-03-06 PROCEDURE — 86580 TB INTRADERMAL TEST: CPT | Performed by: ORTHOPAEDIC SURGERY

## 2019-03-06 PROCEDURE — 77030020263 HC SOL INJ SOD CL0.9% LFCR 1000ML

## 2019-03-06 PROCEDURE — 77030037363 HC FEM INST CR  DISP STRY -C: Performed by: ORTHOPAEDIC SURGERY

## 2019-03-06 PROCEDURE — 77030002912 HC SUT ETHBND J&J -A: Performed by: ORTHOPAEDIC SURGERY

## 2019-03-06 PROCEDURE — 77030013708 HC HNDPC SUC IRR PULS STRY –B: Performed by: ORTHOPAEDIC SURGERY

## 2019-03-06 PROCEDURE — 76942 ECHO GUIDE FOR BIOPSY: CPT | Performed by: ORTHOPAEDIC SURGERY

## 2019-03-06 PROCEDURE — 77030035643 HC BLD SAW OSC PRECIS STRY -C: Performed by: ORTHOPAEDIC SURGERY

## 2019-03-06 PROCEDURE — 85018 HEMOGLOBIN: CPT

## 2019-03-06 PROCEDURE — 77030031139 HC SUT VCRL2 J&J -A: Performed by: ORTHOPAEDIC SURGERY

## 2019-03-06 PROCEDURE — 74011250637 HC RX REV CODE- 250/637: Performed by: PHYSICIAN ASSISTANT

## 2019-03-06 PROCEDURE — 77030034849: Performed by: ORTHOPAEDIC SURGERY

## 2019-03-06 PROCEDURE — 77030012935 HC DRSG AQUACEL BMS -B: Performed by: ORTHOPAEDIC SURGERY

## 2019-03-06 PROCEDURE — 99221 1ST HOSP IP/OBS SF/LOW 40: CPT | Performed by: PHYSICAL MEDICINE & REHABILITATION

## 2019-03-06 PROCEDURE — 77030018836 HC SOL IRR NACL ICUM -A: Performed by: ORTHOPAEDIC SURGERY

## 2019-03-06 PROCEDURE — 94760 N-INVAS EAR/PLS OXIMETRY 1: CPT

## 2019-03-06 PROCEDURE — 76210000016 HC OR PH I REC 1 TO 1.5 HR: Performed by: ORTHOPAEDIC SURGERY

## 2019-03-06 PROCEDURE — 74011000302 HC RX REV CODE- 302: Performed by: ORTHOPAEDIC SURGERY

## 2019-03-06 PROCEDURE — 77030020782 HC GWN BAIR PAWS FLX 3M -B: Performed by: ANESTHESIOLOGY

## 2019-03-06 PROCEDURE — 74011250636 HC RX REV CODE- 250/636

## 2019-03-06 DEVICE — POSTERIOR STABILIZED FEMORAL
Type: IMPLANTABLE DEVICE | Site: KNEE | Status: FUNCTIONAL
Brand: TRIATHLON

## 2019-03-06 DEVICE — TIBIAL BEARING INSERT
Type: IMPLANTABLE DEVICE | Site: KNEE | Status: FUNCTIONAL
Brand: TRIATHLON

## 2019-03-06 DEVICE — TIBIAL COMPONENT
Type: IMPLANTABLE DEVICE | Site: KNEE | Status: FUNCTIONAL
Brand: TRIATHLON

## 2019-03-06 DEVICE — PATELLA
Type: IMPLANTABLE DEVICE | Site: KNEE | Status: FUNCTIONAL
Brand: TRIATHLON

## 2019-03-06 RX ORDER — ASPIRIN 81 MG/1
81 TABLET ORAL EVERY 12 HOURS
Qty: 60 TAB | Refills: 1 | Status: SHIPPED | OUTPATIENT
Start: 2019-03-06 | End: 2019-04-10

## 2019-03-06 RX ORDER — HYDROMORPHONE HYDROCHLORIDE 2 MG/ML
0.5 INJECTION, SOLUTION INTRAMUSCULAR; INTRAVENOUS; SUBCUTANEOUS
Status: DISCONTINUED | OUTPATIENT
Start: 2019-03-06 | End: 2019-03-06 | Stop reason: HOSPADM

## 2019-03-06 RX ORDER — SODIUM CHLORIDE, SODIUM LACTATE, POTASSIUM CHLORIDE, CALCIUM CHLORIDE 600; 310; 30; 20 MG/100ML; MG/100ML; MG/100ML; MG/100ML
75 INJECTION, SOLUTION INTRAVENOUS CONTINUOUS
Status: DISCONTINUED | OUTPATIENT
Start: 2019-03-06 | End: 2019-03-06 | Stop reason: HOSPADM

## 2019-03-06 RX ORDER — SODIUM CHLORIDE 0.9 % (FLUSH) 0.9 %
5-40 SYRINGE (ML) INJECTION AS NEEDED
Status: DISCONTINUED | OUTPATIENT
Start: 2019-03-06 | End: 2019-03-08 | Stop reason: HOSPADM

## 2019-03-06 RX ORDER — CEFAZOLIN SODIUM/WATER 2 G/20 ML
2 SYRINGE (ML) INTRAVENOUS ONCE
Status: COMPLETED | OUTPATIENT
Start: 2019-03-06 | End: 2019-03-06

## 2019-03-06 RX ORDER — FENTANYL CITRATE 50 UG/ML
100 INJECTION, SOLUTION INTRAMUSCULAR; INTRAVENOUS ONCE
Status: COMPLETED | OUTPATIENT
Start: 2019-03-06 | End: 2019-03-06

## 2019-03-06 RX ORDER — SODIUM CHLORIDE 9 MG/ML
100 INJECTION, SOLUTION INTRAVENOUS CONTINUOUS
Status: DISPENSED | OUTPATIENT
Start: 2019-03-06 | End: 2019-03-08

## 2019-03-06 RX ORDER — HYDROMORPHONE HYDROCHLORIDE 2 MG/1
2 TABLET ORAL
Status: DISCONTINUED | OUTPATIENT
Start: 2019-03-06 | End: 2019-03-08 | Stop reason: HOSPADM

## 2019-03-06 RX ORDER — ACETAMINOPHEN 500 MG
1000 TABLET ORAL EVERY 6 HOURS
Status: DISCONTINUED | OUTPATIENT
Start: 2019-03-07 | End: 2019-03-08 | Stop reason: HOSPADM

## 2019-03-06 RX ORDER — HYDROMORPHONE HYDROCHLORIDE 2 MG/1
2 TABLET ORAL
Qty: 40 TAB | Refills: 0 | Status: SHIPPED | OUTPATIENT
Start: 2019-03-06 | End: 2019-04-05

## 2019-03-06 RX ORDER — LABETALOL HYDROCHLORIDE 5 MG/ML
INJECTION, SOLUTION INTRAVENOUS AS NEEDED
Status: DISCONTINUED | OUTPATIENT
Start: 2019-03-06 | End: 2019-03-06 | Stop reason: HOSPADM

## 2019-03-06 RX ORDER — OXYCODONE AND ACETAMINOPHEN 10; 325 MG/1; MG/1
1 TABLET ORAL AS NEEDED
Status: DISCONTINUED | OUTPATIENT
Start: 2019-03-06 | End: 2019-03-06 | Stop reason: HOSPADM

## 2019-03-06 RX ORDER — TERAZOSIN 5 MG/1
10 CAPSULE ORAL
Status: DISCONTINUED | OUTPATIENT
Start: 2019-03-06 | End: 2019-03-08 | Stop reason: HOSPADM

## 2019-03-06 RX ORDER — OXYCODONE HYDROCHLORIDE 5 MG/1
5 TABLET ORAL
Status: DISCONTINUED | OUTPATIENT
Start: 2019-03-06 | End: 2019-03-06 | Stop reason: HOSPADM

## 2019-03-06 RX ORDER — TAMSULOSIN HYDROCHLORIDE 0.4 MG/1
0.4 CAPSULE ORAL DAILY
Status: DISCONTINUED | OUTPATIENT
Start: 2019-03-07 | End: 2019-03-08 | Stop reason: HOSPADM

## 2019-03-06 RX ORDER — ROPIVACAINE HYDROCHLORIDE 2 MG/ML
INJECTION, SOLUTION EPIDURAL; INFILTRATION; PERINEURAL
Status: COMPLETED | OUTPATIENT
Start: 2019-03-06 | End: 2019-03-06

## 2019-03-06 RX ORDER — NALOXONE HYDROCHLORIDE 0.4 MG/ML
.2-.4 INJECTION, SOLUTION INTRAMUSCULAR; INTRAVENOUS; SUBCUTANEOUS
Status: DISCONTINUED | OUTPATIENT
Start: 2019-03-06 | End: 2019-03-08 | Stop reason: HOSPADM

## 2019-03-06 RX ORDER — CEFAZOLIN SODIUM/WATER 2 G/20 ML
2 SYRINGE (ML) INTRAVENOUS EVERY 8 HOURS
Status: COMPLETED | OUTPATIENT
Start: 2019-03-06 | End: 2019-03-07

## 2019-03-06 RX ORDER — PROPOFOL 10 MG/ML
INJECTION, EMULSION INTRAVENOUS
Status: DISCONTINUED | OUTPATIENT
Start: 2019-03-06 | End: 2019-03-06 | Stop reason: HOSPADM

## 2019-03-06 RX ORDER — FINASTERIDE 5 MG/1
5 TABLET, FILM COATED ORAL
Status: DISCONTINUED | OUTPATIENT
Start: 2019-03-06 | End: 2019-03-06 | Stop reason: SDUPTHER

## 2019-03-06 RX ORDER — ROPIVACAINE HYDROCHLORIDE 2 MG/ML
INJECTION, SOLUTION EPIDURAL; INFILTRATION; PERINEURAL AS NEEDED
Status: DISCONTINUED | OUTPATIENT
Start: 2019-03-06 | End: 2019-03-06 | Stop reason: HOSPADM

## 2019-03-06 RX ORDER — HYDROMORPHONE HYDROCHLORIDE 2 MG/ML
1 INJECTION, SOLUTION INTRAMUSCULAR; INTRAVENOUS; SUBCUTANEOUS
Status: DISCONTINUED | OUTPATIENT
Start: 2019-03-06 | End: 2019-03-08 | Stop reason: HOSPADM

## 2019-03-06 RX ORDER — ACETAMINOPHEN 10 MG/ML
1000 INJECTION, SOLUTION INTRAVENOUS ONCE
Status: COMPLETED | OUTPATIENT
Start: 2019-03-06 | End: 2019-03-06

## 2019-03-06 RX ORDER — FINASTERIDE 5 MG/1
5 TABLET, FILM COATED ORAL
Status: DISCONTINUED | OUTPATIENT
Start: 2019-03-06 | End: 2019-03-08 | Stop reason: HOSPADM

## 2019-03-06 RX ORDER — CELECOXIB 200 MG/1
200 CAPSULE ORAL EVERY 12 HOURS
Status: DISCONTINUED | OUTPATIENT
Start: 2019-03-06 | End: 2019-03-08 | Stop reason: HOSPADM

## 2019-03-06 RX ORDER — AMOXICILLIN 250 MG
2 CAPSULE ORAL DAILY
Status: DISCONTINUED | OUTPATIENT
Start: 2019-03-07 | End: 2019-03-08 | Stop reason: HOSPADM

## 2019-03-06 RX ORDER — DIPHENHYDRAMINE HCL 25 MG
25 CAPSULE ORAL
Status: DISCONTINUED | OUTPATIENT
Start: 2019-03-06 | End: 2019-03-08 | Stop reason: HOSPADM

## 2019-03-06 RX ORDER — SODIUM CHLORIDE 0.9 % (FLUSH) 0.9 %
5-40 SYRINGE (ML) INJECTION AS NEEDED
Status: DISCONTINUED | OUTPATIENT
Start: 2019-03-06 | End: 2019-03-06 | Stop reason: HOSPADM

## 2019-03-06 RX ORDER — NEOMYCIN AND POLYMYXIN B SULFATES 40; 200000 MG/ML; [USP'U]/ML
SOLUTION IRRIGATION AS NEEDED
Status: DISCONTINUED | OUTPATIENT
Start: 2019-03-06 | End: 2019-03-06 | Stop reason: HOSPADM

## 2019-03-06 RX ORDER — DEXAMETHASONE SODIUM PHOSPHATE 100 MG/10ML
10 INJECTION INTRAMUSCULAR; INTRAVENOUS ONCE
Status: ACTIVE | OUTPATIENT
Start: 2019-03-07 | End: 2019-03-08

## 2019-03-06 RX ORDER — ONDANSETRON 2 MG/ML
4 INJECTION INTRAMUSCULAR; INTRAVENOUS
Status: DISCONTINUED | OUTPATIENT
Start: 2019-03-06 | End: 2019-03-08 | Stop reason: HOSPADM

## 2019-03-06 RX ORDER — DEXAMETHASONE SODIUM PHOSPHATE 4 MG/ML
INJECTION, SOLUTION INTRA-ARTICULAR; INTRALESIONAL; INTRAMUSCULAR; INTRAVENOUS; SOFT TISSUE
Status: COMPLETED | OUTPATIENT
Start: 2019-03-06 | End: 2019-03-06

## 2019-03-06 RX ORDER — SODIUM CHLORIDE 0.9 % (FLUSH) 0.9 %
5-40 SYRINGE (ML) INJECTION EVERY 8 HOURS
Status: DISCONTINUED | OUTPATIENT
Start: 2019-03-06 | End: 2019-03-06 | Stop reason: HOSPADM

## 2019-03-06 RX ORDER — SODIUM CHLORIDE 0.9 % (FLUSH) 0.9 %
5-40 SYRINGE (ML) INJECTION EVERY 8 HOURS
Status: DISCONTINUED | OUTPATIENT
Start: 2019-03-06 | End: 2019-03-08 | Stop reason: HOSPADM

## 2019-03-06 RX ORDER — MIDAZOLAM HYDROCHLORIDE 1 MG/ML
2 INJECTION, SOLUTION INTRAMUSCULAR; INTRAVENOUS ONCE
Status: COMPLETED | OUTPATIENT
Start: 2019-03-06 | End: 2019-03-06

## 2019-03-06 RX ORDER — HYDROCHLOROTHIAZIDE 25 MG/1
25 TABLET ORAL DAILY
Status: DISCONTINUED | OUTPATIENT
Start: 2019-03-07 | End: 2019-03-08 | Stop reason: HOSPADM

## 2019-03-06 RX ORDER — ASPIRIN 81 MG/1
81 TABLET ORAL EVERY 12 HOURS
Status: DISCONTINUED | OUTPATIENT
Start: 2019-03-06 | End: 2019-03-08 | Stop reason: HOSPADM

## 2019-03-06 RX ORDER — BUPIVACAINE HYDROCHLORIDE 7.5 MG/ML
INJECTION, SOLUTION INTRASPINAL
Status: COMPLETED | OUTPATIENT
Start: 2019-03-06 | End: 2019-03-06

## 2019-03-06 RX ADMIN — SODIUM CHLORIDE, SODIUM LACTATE, POTASSIUM CHLORIDE, AND CALCIUM CHLORIDE 75 ML/HR: 600; 310; 30; 20 INJECTION, SOLUTION INTRAVENOUS at 09:15

## 2019-03-06 RX ADMIN — BUPIVACAINE HYDROCHLORIDE 10 MG: 7.5 INJECTION, SOLUTION INTRASPINAL at 12:32

## 2019-03-06 RX ADMIN — FINASTERIDE 5 MG: 5 TABLET, FILM COATED ORAL at 22:12

## 2019-03-06 RX ADMIN — LABETALOL HYDROCHLORIDE 5 MG: 5 INJECTION, SOLUTION INTRAVENOUS at 13:05

## 2019-03-06 RX ADMIN — HYDROMORPHONE HYDROCHLORIDE 2 MG: 2 TABLET ORAL at 17:52

## 2019-03-06 RX ADMIN — HYDROMORPHONE HYDROCHLORIDE 2 MG: 2 TABLET ORAL at 22:13

## 2019-03-06 RX ADMIN — Medication 2 G: at 19:57

## 2019-03-06 RX ADMIN — MIDAZOLAM 1 MG: 1 INJECTION INTRAMUSCULAR; INTRAVENOUS at 10:28

## 2019-03-06 RX ADMIN — TERAZOSIN HYDROCHLORIDE 10 MG: 5 CAPSULE ORAL at 22:12

## 2019-03-06 RX ADMIN — LABETALOL HYDROCHLORIDE 5 MG: 5 INJECTION, SOLUTION INTRAVENOUS at 13:26

## 2019-03-06 RX ADMIN — Medication 1 AMPULE: at 21:59

## 2019-03-06 RX ADMIN — SODIUM CHLORIDE, SODIUM LACTATE, POTASSIUM CHLORIDE, AND CALCIUM CHLORIDE: 600; 310; 30; 20 INJECTION, SOLUTION INTRAVENOUS at 10:23

## 2019-03-06 RX ADMIN — DEXAMETHASONE SODIUM PHOSPHATE 5 MG: 4 INJECTION, SOLUTION INTRA-ARTICULAR; INTRALESIONAL; INTRAMUSCULAR; INTRAVENOUS; SOFT TISSUE at 10:30

## 2019-03-06 RX ADMIN — PROPOFOL 75 MCG/KG/MIN: 10 INJECTION, EMULSION INTRAVENOUS at 12:38

## 2019-03-06 RX ADMIN — TUBERCULIN PURIFIED PROTEIN DERIVATIVE 5 UNITS: 5 INJECTION, SOLUTION INTRADERMAL at 09:15

## 2019-03-06 RX ADMIN — LABETALOL HYDROCHLORIDE 5 MG: 5 INJECTION, SOLUTION INTRAVENOUS at 13:02

## 2019-03-06 RX ADMIN — ASPIRIN 81 MG: 81 TABLET ORAL at 21:59

## 2019-03-06 RX ADMIN — ROPIVACAINE HYDROCHLORIDE 40 MG: 2 INJECTION, SOLUTION EPIDURAL; INFILTRATION; PERINEURAL at 10:30

## 2019-03-06 RX ADMIN — ACETAMINOPHEN 1000 MG: 10 INJECTION, SOLUTION INTRAVENOUS at 17:52

## 2019-03-06 RX ADMIN — FENTANYL CITRATE 50 MCG: 50 INJECTION INTRAMUSCULAR; INTRAVENOUS at 10:28

## 2019-03-06 RX ADMIN — CELECOXIB 200 MG: 200 CAPSULE ORAL at 21:59

## 2019-03-06 RX ADMIN — Medication 2 G: at 12:27

## 2019-03-06 RX ADMIN — Medication 3 AMPULE: at 09:15

## 2019-03-06 RX ADMIN — Medication 10 ML: at 22:14

## 2019-03-06 RX ADMIN — SODIUM CHLORIDE, SODIUM LACTATE, POTASSIUM CHLORIDE, AND CALCIUM CHLORIDE: 600; 310; 30; 20 INJECTION, SOLUTION INTRAVENOUS at 12:43

## 2019-03-06 RX ADMIN — SODIUM CHLORIDE 100 ML/HR: 900 INJECTION, SOLUTION INTRAVENOUS at 20:19

## 2019-03-06 NOTE — ANESTHESIA PROCEDURE NOTES
Spinal Block    Start time: 3/6/2019 12:30 PM  End time: 3/6/2019 1:32 PM  Performed by: Gilda Delacruz MD  Authorized by: Gilda Delacruz MD     Pre-procedure:   Indications: primary anesthetic  Preanesthetic Checklist: patient identified, risks and benefits discussed, anesthesia consent, site marked, patient being monitored and timeout performed    Timeout Time: 12:30          Spinal Block:   Patient Position:  Seated  Prep Region:  Lumbar  Prep: chlorhexidine and patient draped      Location:  L2-3  Technique:  Single shot        Needle:   Needle Type:  Pencan  Needle Gauge:  25 G  Attempts:  1      Events: CSF confirmed, no blood with aspiration and no paresthesia        Assessment:  Insertion:  Uncomplicated  Patient tolerance:  Patient tolerated the procedure well with no immediate complications

## 2019-03-06 NOTE — PERIOP NOTES
Betadine lavage:  17.5cc of betadine lot # [id number] , exp. Date: 06/20  ,  in 500cc of . 9NS Lot # [id number] , exp. Date : 4MRQ7000.

## 2019-03-06 NOTE — ROUTINE PROCESS
Teach back method used with patient concerning hibiclens wash, TB screening, incentive spirometer, and pain management goals. Patient and family were provided with home discharge needs list. Son to bring IS .

## 2019-03-06 NOTE — PROGRESS NOTES
TRANSFER - IN REPORT:    Verbal report received from Jo Demarco RN on Crow Roman  being received from PACU for routine post - op      Report consisted of patients Situation, Background, Assessment and   Recommendations(SBAR). Information from the following report(s) SBAR, Intake/Output and MAR was reviewed with the receiving nurse. Opportunity for questions and clarification was provided. Assessment completed upon patients arrival to unit and care assumed. Oriented to room, bed controls, and how to order meals. No complaints. Moving LEs slightly but still very numb. Family in room. Aquacel dry and intact to R knee with iceman. SCDs and yellow gripper socks to feet and instructed not to get up without staff to assist. Has IS. To call for pain medication when needed.

## 2019-03-06 NOTE — OP NOTES
[facility]  Cementless Total Knee Arthroplasty: Posterior Cruciate Retaining     Patient:Everette Pearson   : 1945  Medical Record Number:[id number]  Pre-operative Diagnosis:  Primary osteoarthritis of right knee [M17.11]  Post-operative Diagnosis: Osteoarthritis of right knee  Location: [address] [place] [address]  Surgeon: Meredith Ambrosio MD   Assistant: Supa Early    Anesthesia: Spinal and FNB    Procedure:Procedure(s) (LRB):  KNEE ARTHROPLASTY TOTAL/ RIGHT/ CHRISTOPHER/ FNB (Right)   The complexity of the total joint surgery requires the use of a first assistant for positioning, retraction and expertise in closure. Tourniquet Time: 0 minutes  EBL: 250 cc  Findings: severe degenerative arthritis, patellar osteophytes, posterior femoral osteophytes   BMI: Body mass index is 30 kg/m². Tayla Hanna was brought to the operating room and positioned on the operating table. He was anesthestized with anesthesia. A pires catheter was placed preoperatively and IV antibiotics was administered. Prior to the incision being made a timeout was called identifying the patient, procedure ,operative side and surgeon The operative leg was prepped and draped in the usual sterile manner. An anterior longitudinal incision was accomplished just medial to the tibial tubercle and extending approximal 6 centimeters proximal to the superior pole of the patella. A medial parapatellar capsular incision was performed. The medial capsular flap was elevated around to the insertion of the semimembranous tendon. The patella was everted and the knee flexed and externally rotated. The medial and external menisci were excised. The lateral half of the fat pad excised and the patella femoral ligament was released. The anterior cruciate ligament was resect and the posterior cruciate ligament was retained. Using extramedullary instrumentation, the tibial cut was accomplished with appropriate posterior slope. The distal femur was addressed. A drill hole was made above the intracondylar notch. Using appropriate intramedullary instrumentation,a five degree valgus distal cut was accomplished. The femur was sized. The anterior and posterior cuts were then made about the distal femur. The osteophytes were removed from the tibial and femoral surfaces. The flexion and extension gaps were assessed with the appropriate spacer blocks. Additional surgical procedures included: none. The flexion and extension gaps were deemed appropriately balanced. The appropriate cutting blocks were then utilized to perform the anterior, posterior and chamfer cuts, with appropriate lateral translation accomplished for the patellofemoral groove. Approximately 9 mm of bone was removed from the high side of the tibia. The tibia was sized. .  The tibial base plate was pinned into place with the appropriate external rotation and stem site prepared. A preliminary range of motion was accomplished. The  Patient was found to obtain full extension as well as appropriate flexion. The patient's ligaments were stable in flexion and extension to medial and lateral stressing and the alignment was through the appropriate mechanical axis. The patella was then everted. The bone was resect to accommodate the three peg patella button. A trial reduction revealed appropriate tracking through the patellofemoral groove with no lateral retinacular release being accomplished. All trial components were removed. The real implants were opened: Sizes listed below. The knee was irrigated. There were no femoral deficiencies. There were no tibial deficiencies. No augmentation was utilized. The permanent cementless Tibial and Femoral components were impacted into place. The cementless  patella component was then pressed in place.     [facility] knee was placed through range of motion and noted to be stable as mentioned above with the trail components. The wound was dry, therefore no drain was used. The operative knee was injected with 60 cc of Naropin, 10 cc's of morphine and 1 cc of 30 mg of Toradol. The knee was then soaked with a diluted betadine solution for approximately 3 min. This was then thoroughly irrigated. The capsular layer was closed using a #1 PDS suture. Then, 1 gram (100 mg/ml) of Transexamic Acid was injected into the joint space. The subcutaneous layers were closed using 2-0 Stratafix. Finally the skin was closed using 3-0 Vicryl and skin staples, which were applied in occlusive fashion and sterile bandage applied. An Iceman cryo pad was applied on the operative leg. Sponge count and needle counts were correct. Nicholas Cushing left the operating room     Implants:   Implant Name Type Inv.  Item Serial No.  Lot No. LRB No. Used   COMPNT FEM PS TRIATHLN 6 R PA --  - [id number]  COMPNT FEM PS TRIATHLN 6 R PA --  [id number] [employer] HOW [id number] Right 1   BASEPLT TIB PC TRITNM SZ 7 -- TRIATHLON - [id number]  BASEPLT TIB PC TRITNM SZ 7 -- TRIATHLON [id number] [employer] HOW [id number] Right 1   PAT ASYM MTL-BK 11MM SZ A38 -- TRIATHLON - [id number]  PAT ASYM MTL-BK 11MM SZ A38 -- TRIATHLON [id number] [employer] HOW [id number] Right 1   INSERT TIB PS TRIATHLN 7 13MM --  - [id number]  INSERT TIB PS TRIATHLN 7 13MM --  [id number] [employer] HOW [id number] Right 1         Signed By: Kanu Parry MD   3/6/2019,  1:55 PM

## 2019-03-06 NOTE — ANESTHESIA POSTPROCEDURE EVALUATION
Procedure(s):  KNEE ARTHROPLASTY TOTAL/ RIGHT/ CHRISTOPHER/ FNB.     Anesthesia Post Evaluation      Multimodal analgesia: multimodal analgesia used between 6 hours prior to anesthesia start to PACU discharge  Patient location during evaluation: bedside  Patient participation: complete - patient participated  Level of consciousness: awake and responsive to light touch  Pain management: adequate  Airway patency: patent  Anesthetic complications: no  Cardiovascular status: acceptable, hemodynamically stable, blood pressure returned to baseline and stable  Respiratory status: acceptable, unassisted, spontaneous ventilation and nonlabored ventilation  Hydration status: acceptable  Post anesthesia nausea and vomiting:  controlled      Visit Vitals  /61   Pulse (!) 58   Temp 36.6 °C (97.8 °F)   Resp 16   Ht 6' 3\" (1.905 m)   Wt 108.9 kg (239 lb 16 oz)   SpO2 (!) 59%   BMI 30.00 kg/m²

## 2019-03-06 NOTE — OP NOTES
[facility]  Total Knee Arthroplasty  Patient:Everette Cadena   : 1945  Medical Record Number:[id number]  Pre-operative Diagnosis:  Primary osteoarthritis of right knee [M17.11]  Post-operative Diagnosis: Osteoarthritis  Location: [address] [place] [address]  Surgeon: Kanu Parry MD  Assistant: Carrie Houston    Anesthesia: Spinal and FNB    Procedure: Procedure(s) (LRB):  KNEE ARTHROPLASTY TOTAL/ RIGHT/ CHRISTOPHER/ FNB (Right)    The complexity of the total joint surgery requires the use of a first assistant for positioning, retraction and expertise in closure. Tourniquet Time: 0 minutes  EBL: 250cc  Findings: severe degenerative arthritis, patellar osteophytes, posterior femoral osteophytes   BMI: Body mass index is 30 kg/m². Narciso Cushing was brought to the operating room and positioned on the operating table. He was anethestized with anesthesia. A pires catheter was placed preoperatively and IV antibiotics was administered. Prior to the incision being made a timeout was called identifying the patient, procedure ,operative side and surgeon. .The operative leg was prepped and draped in the usual sterile manner. An anterior longitudinal incision was accomplished just medial to the tibial tubercle and extending approximal 6 centimeters proximal to the superior pole of the patella. A medial parapatellar capsular incision was performed. The medial capsular flap was elevated around to the insertion of the semimembranous tendon. The patella was everted and the knee flexed and externally rotated. The medial and external menisci were excised. The lateral half of the fat pad excised and the patella femoral ligament was released. The anterior cruciate ligament was resected and the posterior cruciate ligament was substituted. Using extramedullary instrumentation, the tibial cut was accomplished with appropriate posterior slope.   Approxiamately 9mm of bone was removed from the high side of the tibia. The distal femur was next addressed. A drill hole was made above the intracondolar notch. Using appropriate intramedullary instrumentation,a five degree valgus distal cut was accomplished. The femur was sized. The anterior and posterior cuts were then made about the distal femur. The osteophytes were removed from the tibial and femoral surfaces. The flexion and extension gaps were assessed with the appropriate spacer blocks. Additional surgical procedures included: none. The flexion and extension gaps were deemed appropriately balanced. The appropriate cutting blocks were then utilized to perform the anterior chamfer, posterior chamfer and notch cuts, with appropriate lateral tranlation accomplished for the patellofemoral groove. The tibia baseplate was sized. The tibial base plate was pinned into place with the appropriate external rotation and stem site prepared. A preliminary range of motion was accomplished with  trial components. The patient was able to obtain full extension as well as appropriate flexion. The patient's ligaments were stable in flexion and extension to medial and lateral stressing and the alignment was through the appropriate mechanical axis. The patella was then everted. The bone was resected to accomodate the three peg  patella button. A trial reduction revealed appropriate tracking through the patellofemoral groove with no lateral retinacular release being accomplished. All trial components were removed. The knee was irrigated. There were no femoral deficiencies. There were no tibial deficiencies. No augmentation was utilized. The permanent Tibial and Femoral components were impacted into place. The patella component was then pressed in place. Milus Gains knee was placed through range of motion and noted to be stable as mentioned above with the trail components. The wound was dry, therefore no drain was used.  The operative knee was injected with 60cc of Naropin, 10 cc's of morphine and 1 cc of 30mg of Toradol. The knee was then soaked with a diluted betadine solution for approximately 3 min. This was then thoroughly irrigated. The capsular layer was closed using a #1 PDS suture. The knee joint was then injected with transexamic acid. The subcutaneous layers were closed using 2-0 Stratafix suture. Finally the skin was closed using 3-0 Vicryl and skin staples, which were applied in occlusive fashion and sterile bandage applied. An Iceman cryo pad was applied on the operative leg. Sponge count and needle counts were correct. Josy Lopez left the operating room     Implants:   Implant Name Type Inv.  Item Serial No.  Lot No. LRB No. Used   COMPNT FEM PS TRIATHLN 6 R PA --  - [id number]  COMPNT FEM PS TRIATHLN 6 R PA --  [id number] [employer] HOW [id number] Right 1   BASEPLT TIB PC TRITNM SZ 7 -- TRIATHLON - [id number]  BASEPLT TIB PC TRITNM SZ 7 -- TRIATHLON [id number] [employer] HOW [id number] Right 1   PAT ASYM MTL-BK 11MM SZ A38 -- TRIATHLON - [id number]  PAT ASYM MTL-BK 11MM SZ A38 -- TRIATHLON [id number] [employer] HOW [id number] Right 1   INSERT TIB PS TRIATHLN 7 13MM --  - [id number]  INSERT TIB PS TRIATHLN 7 13MM --  [id number] [employer] HOW [id number] Right 1           Signed By: Melissa Miller MD  3/6/2019,  1:57 PM

## 2019-03-06 NOTE — PROGRESS NOTES
PT/OT Note: Patient legs still pharmaceutically numb from surgery and not safe to stand or transfer at this time, will initiate therapies tomorrow in the AM.      Carlos Smith, OT

## 2019-03-06 NOTE — PERIOP NOTES
TRANSFER - OUT REPORT:    Verbal report given to ASHLEY Davis on Yoselyn Stein  being transferred to [facility] for routine progression of care       Report consisted of patients Situation, Background, Assessment and   Recommendations(SBAR). Information from the following report(s) Kardex, MAR and Recent Results was reviewed with the receiving nurse. Lines:   Peripheral IV 03/06/19 Left Hand (Active)   Site Assessment Clean, dry, & intact 3/6/2019  9:18 AM   Phlebitis Assessment 0 3/6/2019  9:18 AM   Infiltration Assessment 0 3/6/2019  9:18 AM   Dressing Status Clean, dry, & intact 3/6/2019  9:18 AM   Dressing Type Tape;Transparent 3/6/2019  9:18 AM   Hub Color/Line Status Infusing;Patent;Green 3/6/2019  9:18 AM   Action Taken Blood drawn 3/6/2019  9:18 AM        Opportunity for questions and clarification was provided.       Patient transported with:   Verified Identity Pass

## 2019-03-06 NOTE — PERIOP NOTES
TRANSFER - OUT REPORT:    Verbal report given to [facility] RN(name) on Celestina Deal  being transferred to [facility](unit) for routine progression of care       Report consisted of patients Situation, Background, Assessment and   Recommendations(SBAR). Information from the following report(s) SBAR, Kardex, OR Summary, Intake/Output, MAR and Cardiac Rhythm SB/NSR was reviewed with the receiving nurse. Lines:   Peripheral IV 03/06/19 Left Hand (Active)   Site Assessment Clean, dry, & intact 3/6/2019  3:18 PM   Phlebitis Assessment 0 3/6/2019  3:18 PM   Infiltration Assessment 0 3/6/2019  3:18 PM   Dressing Status Clean, dry, & intact 3/6/2019  3:18 PM   Dressing Type Transparent;Tape 3/6/2019  3:18 PM   Hub Color/Line Status Green; Infusing 3/6/2019  3:18 PM   Action Taken Blood drawn 3/6/2019  9:18 AM        Opportunity for questions and clarification was provided.       Patient transported with:   room air

## 2019-03-06 NOTE — PROGRESS NOTES
03/06/19 1643   Oxygen Therapy   O2 Sat (%) 99 %   Pulse via Oximetry 64 beats per minute   O2 Device Room air   Joint Camp Notes Reviewed. Pt working on IS. Pt encouraged to do 10 breaths per hour while awake on IS. Good NPC. No respiratory distress noted at this time. No complications noted at this time.  C/s Jenni 03 at bedside

## 2019-03-06 NOTE — CONSULTS
Physical Medicine & Rehabilitation Note-consult    Patient: Pan Trivedi MRN: [id number]  SSN: [id number]    YOB: 1945  Age: 75 y.o. Sex: male      Admit Date: 3/6/2019  Admitting Physician: Magdiel Nicolas MD     Medical Decision Making/Plan/Recommend: s/p R TKA. Gait impairment and functional deficits. Acute PT, OT to begin in am for active/assisted/passive ROM, strengthening, mobility, transfers, gait training. Will follow progress. Pt has a relatively recent diagnosis of CMT. Appears distal weakness/ sensory deficit  is very minimal BLE. Anticipate no major barriers to functional recovery. Continued rehab at home via [facility] PT would be reasonable and necessary. Chief Complaint : Gait dysfunction secondary to below. Admit Diagnosis: Primary osteoarthritis of right knee [M17.11]; Osteoarthritis of right knee [M17.11]; Osteoarthritis [M19.90]  right total knee arthroplasty 3/6/2019  Pain  DVT risk  Post op hemorrhagic anemia  CMT (Charcot-Leslie-Tooth disease)  Hypertension  Hx of lumbar spine sx.   Acute Rehab Dx:  Gait impairment  Mobility and ambulation deficits  Self Care/ADL deficits    Medical Dx:  Past Medical History:   Diagnosis Date    Arthritis     Avascular necrosis ([address]     right hip    BPH (benign prostatic hyperplasia)     medication    CMT (Charcot-Leslie-Tooth disease)     Followed by Dr. 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 male patient at [address] [place] [address] who was admitted on 3/6/2019  by Magdiel Nicolas MD with below mentioned medical history, is being seen for Physical Medicine and Rehabilitation consult. Cisco Salcido who had severe right knee pain  due to end stage DJD underwent a right total knee arthroplasty per Dr. Nadir Caicedo MD on 3/6/2019. Patient is neurologically intact BLE post op. Patient is to be WBAT RLE. Acute PT and OT to resume in am. Anticipate no major barriers to progress. Patient is limited by right knee pain, decreased ROM and strength. Cisco Salcido is seen and examined today. Medical Records reviewed. Pt has recent diagnosis of CMT. He denies major debilities from it. He has also painful L knee due to DJD. Patient has been independent with ambulation, prior to admission, limited by knee pain. Previous Functional Level: independent      Current Level of Function:   bed mobility - min A, transfers - min A x2, decreased balance , ambulation - 5' with RW and min A x2. Family History   Problem Relation Age of Onset   Jackman Arthritis-osteo Mother     Cancer Father     Diabetes Father     Hypertension Other         gen fam hx      Social History     Tobacco Use    Smoking status: Never Smoker    Smokeless tobacco: Never Used   Substance Use Topics    Alcohol use: No     Past Surgical History:   Procedure Laterality Date    HX BACK SURGERY      Lumbar laminectomy- no hardware     HX HIP REPLACEMENT Right 10/2011    total    HX OTHER SURGICAL  2002    skin cancer removed from lip, plastic surgery afterward    HX OTHER SURGICAL      skin cancer from the ear,left leg and behind the area    HX TONSILLECTOMY        Prior to Admission medications    Medication Sig Start Date End Date Taking? Authorizing Provider   HYDROmorphone (DILAUDID) 2 mg tablet Take 1 Tab by mouth every four (4) hours as needed for Pain for up to 30 days. Max Daily Amount: 12 mg. 3/6/19 4/5/19 Yes FRANSISCO Remy   aspirin delayed-release 81 mg tablet Take 1 Tab by mouth every twelve (12) hours for 35 days.  3/6/19 4/10/19 Yes FRANSISCO Remy   docusate sodium (STOOL SOFTENER) 100 mg capsule Take 100 mg by mouth daily. Yes Provider, Historical   cetirizine (ZYRTEC) 10 mg tablet Take 10 mg by mouth nightly. Yes Provider, Historical   tamsulosin (FLOMAX) 0.4 mg capsule Take 0.4 mg by mouth daily. Yes Provider, Historical   finasteride (PROSCAR) 5 mg tablet Take 1 Tab by mouth daily for 90 days. Patient taking differently: Take 5 mg by mouth nightly. 19 Yes Abida Burrows MD   finasteride (PROSCAR) 5 mg tablet Take 1 Tab by mouth daily for 90 days. Patient taking differently: Take 5 mg by mouth nightly. 19 Yes Abida Burrows MD   terazosin (HYTRIN) 10 mg capsule Take 10 mg by mouth nightly. Yes Provider, Historical   hydrochlorothiazide (HYDRODIURIL) 25 mg tablet Take 25 mg by mouth daily. Yes Provider, Historical   cholecalciferol, VITAMIN D3, (VITAMIN D3) 5,000 unit tab tablet Take 5,000 Units by mouth every seven (7) days. Provider, Historical   diclofenac EC (VOLTAREN) 75 mg EC tablet Take 75 mg by mouth daily. Provider, Historical   tadalafil (CIALIS) 5 mg tablet Take 1 Tab by mouth daily. Patient taking differently: Take 5 mg by mouth nightly. 19   Abida Burrows MD   tadalafil (CIALIS) 5 mg tablet Take 1 Tab by mouth daily. Patient taking differently: Take 5 mg by mouth nightly. 16   Franco López MD     Allergies   Allergen Reactions    Adhesive Rash        Review of Systems: +right knee pain, +antalgic gait. Denies chest pain, shortness of breath, cough, headache, visual problems, abdominal pain, dysurea, calf pain. Pertinent positives are as noted in the medical records and unremarkable otherwise. Objective:     Vitals:  Blood pressure 147/78, pulse (!) 58, temperature 97.3 °F (36.3 °C), resp. rate 16, height 6' 3\" (1.905 m), weight 239 lb 16 oz (108.9 kg), SpO2 99 %.   Temp (24hrs), Av.7 °F (36.5 °C), Min:97.3 °F (36.3 °C), Max:98.1 °F (36.7 °C)    BMI (calculated): 30 (19 0804)   Intake and Output:  No intake/output data recorded. Physical Exam:   General: Alert and age appropriately oriented. No acute cardio respiratory distress. HEENT: Normocephalic, no conjunctival pallor, no scleral icterus  Oral mucosa moist without cyanosis, no JVD   Lungs: Clear to auscultation bilaterally. Respiration even and unlabored   Heart: Regular rate and rhythm, S1, S2   No  murmurs, clicks, rub or gallops   Abdomen: Soft, non-tender, non-distended. Genitourinary: defered   Neuromuscular:      Grossly no focal motor deficits. Right knee extension strong  Right ankle dorsiflexion 5/5  Right ankle plantarflexion 5/5  No sensory deficits distally BLE to soft touch. Skin/extremity: No calf tenderness BLE. No rashes, no marginal erythema. Mildly high arches.                                                                                          Labs/Studies:  Recent Results (from the past 72 hour(s))   TYPE & SCREEN    Collection Time: 03/06/19  9:34 AM   Result Value Ref Range    Crossmatch Expiration 03/09/2019     ABO/Rh(D) A NEGATIVE     Antibody screen NEG    GLUCOSE, POC    Collection Time: 03/06/19  9:50 AM   Result Value Ref Range    Glucose (POC) 125 (H) 65 - 100 mg/dL       Functional Assessment:  Reviewed participation and progress in therapies                                Ambulation:       Impression/Plan:     Principal Problem:    Status post total knee replacement, right (3/6/2019)    Active Problems:    Osteoarthritis of right knee (3/6/2019)      Osteoarthritis (3/6/2019)        Current Facility-Administered Medications   Medication Dose Route Frequency Provider Last Rate Last Dose    tuberculin injection 5 Units  5 Units IntraDERMal Tucker Dawson MD   5 Units at 03/06/19 0915    finasteride (PROSCAR) tablet 5 mg  5 mg Oral QHS FRANSISCO Uribe        [START ON 3/7/2019] hydroCHLOROthiazide (HYDRODIURIL) tablet 25 mg  25 mg Oral DAILY FRANSISCO Uribe        [START ON 3/7/2019] tamsulosin (FLOMAX) capsule 0.4 mg  0.4 mg Oral DAILY FRANSISCO Fletcher        terazosin (HYTRIN) capsule 10 mg  10 mg Oral QHS FRANSISCO Fletcher        alcohol 62% (NOZIN) nasal  1 Ampule  1 Ampule Topical Q12H Jonelle Fletcher        0.9% sodium chloride infusion  100 mL/hr IntraVENous CONTINUOUS FRANSISCO Fletcher        sodium chloride (NS) flush 5-40 mL  5-40 mL IntraVENous Q8H Jonelle Fletcher        sodium chloride (NS) flush 5-40 mL  5-40 mL IntraVENous PRN FRANSISCO Fletcher        ceFAZolin (ANCEF) 2 g/20 mL in sterile water IV syringe  2 g IntraVENous Q8H Jonelle Fletcher        [START ON 3/7/2019] acetaminophen (TYLENOL) tablet 1,000 mg  1,000 mg Oral Q6H oJnelle Fletcher        celecoxib (CELEBREX) capsule 200 mg  200 mg Oral Q12H Jonelle Fletcher        HYDROmorphone (DILAUDID) tablet 2 mg  2 mg Oral Q4H PRN FRANSISCO Fletcher   2 mg at 03/06/19 1752    HYDROmorphone (PF) (DILAUDID) injection 1 mg  1 mg IntraVENous Q3H PRN FRANSISCO Fletcher        naloxone [facility]) injection 0.2-0.4 mg  0.2-0.4 mg IntraVENous Q10MIN PRN FRANSISCO Fletcher        [START ON 3/7/2019] dexamethasone (DECADRON) injection 10 mg  10 mg IntraVENous Arvin Rotunda, Branda Brunner, PA        ondansetron [facility]) injection 4 mg  4 mg IntraVENous Q4H PRN FRANSISCO Fletcher        diphenhydrAMINE (BENADRYL) capsule 25 mg  25 mg Oral Q4H PRN FRANSISCO Fletcher        [START ON 3/7/2019] senna-docusate (PERICOLACE) 8.6-50 mg per tablet 2 Tab  2 Tab Oral DAILY Jonelle Fletcher        aspirin delayed-release tablet 81 mg  81 mg Oral Q12H FRANSISCO Fletcher            Recommendations:  Plan for home discharge with [facility] PT. Continue Acute Rehab Program.  Coordination of rehab/medical care. Counseling of Physical Medicine & Rehab care issues management. Monitoring and management of rehab conditions per the plan of care/orders.        Rehabilitation Management/ Medical Management: 1. Devices:Walkers, Type: Rolling Walker  2. Consult:Rehab team including PT, OT,  and . 3. Disposition Rehab-discussed with patient. 4. Thigh-high or knee-high GEORGE's when out of bed. 5. DVT Prophylaxis - aspirin 81mg bid x 30days. 6. Incentive spirometer Q1H while awake  7. Post op hemorrhagic anemia- monitor. 8. Activity: WBAT RLE  9. Planned Labs: CBC,BMP  10. Pain Control:  Continue with scheduled tylenol, celebrex and  PRN meds   11. Wound Care: Keep right TKA wound clean and dry and reinforce dressing PRN. May remove Aquacel 1 week post op ad replace with new one. Remove staples 12-14 post surgery, when incision appears appropriately closed and apply benzoin and 1/2\" steristrips. Follow up with ORTHO per instructions. Thank you for the opportunity to participate in the care of this patient.     Signed By: Adwoa Sánchez MD

## 2019-03-06 NOTE — H&P
The patient has end stage arthritis of the right knee. The patient was see and examined and there are no changes to the patient's orthopedic condition. They have tried conservative treatment for this condition; including antiinflammatories and lifestyle modifications and have failed. The necessity for the joint replacement is still present, and the H&P from the office is still current. The patient will be admitted today for Procedure(s) (LRB):  KNEE ARTHROPLASTY TOTAL/ RIGHT/ CHRISTOPHER/ FNB (Right) .

## 2019-03-06 NOTE — ANESTHESIA PREPROCEDURE EVALUATION
Anesthetic History   No history of anesthetic complications            Review of Systems / Medical History  Patient summary reviewed and pertinent labs reviewed    Pulmonary  Within defined limits                 Neuro/Psych   Within defined limits           Cardiovascular    Hypertension: well controlled              Exercise tolerance: >4 METS     GI/Hepatic/Renal  Within defined limits             Comments: Increased Cr on recent lab Endo/Other        Arthritis    Comments: Increased glc on random labs Other Findings            Physical Exam    Airway  Mallampati: III  TM Distance: 4 - 6 cm  Neck ROM: normal range of motion   Mouth opening: Normal     Cardiovascular    Rhythm: regular           Dental    Dentition: Caps/crowns     Pulmonary                 Abdominal  GI exam deferred       Other Findings            Anesthetic Plan    ASA: 3  Anesthesia type: spinal - femoral single shot      Post-op pain plan if not by surgeon: peripheral nerve block single    Induction: Intravenous  Anesthetic plan and risks discussed with: Patient

## 2019-03-06 NOTE — CONSULTS
Hospitalist Consult    This is a 76-year-old male with a past medical history of BPH, hypertension, avascular necrosis of the right hip status post arthroplasty comes in because of severe right knee osteoarthritis for which she underwent a total right knee arthroplasty. Patient is currently on Flomax, terazosin, hydrochlorothiazide 25 mg once daily, Proscar 5 mg nightly his blood pressure is stable. I have reviewed his current medications, vital signs, labs as listed below      There is no active medical issue which needs to be addressed at this time. I will sign off. Please call with any questions or concerns. Thank you for the consult.     Patient will not be charged for this encounter. Prior to Admission medications    Medication Sig Start Date End Date Taking? Authorizing Provider   HYDROmorphone (DILAUDID) 2 mg tablet Take 1 Tab by mouth every four (4) hours as needed for Pain for up to 30 days. Max Daily Amount: 12 mg. 3/6/19 4/5/19 Yes FRANSISCO Fletcher   aspirin delayed-release 81 mg tablet Take 1 Tab by mouth every twelve (12) hours for 35 days. 3/6/19 4/10/19 Yes FRANSISCO Fletcher   docusate sodium (STOOL SOFTENER) 100 mg capsule Take 100 mg by mouth daily. Yes Provider, Historical   cetirizine (ZYRTEC) 10 mg tablet Take 10 mg by mouth nightly. Yes Provider, Historical   tamsulosin (FLOMAX) 0.4 mg capsule Take 0.4 mg by mouth daily. Yes Provider, Historical   finasteride (PROSCAR) 5 mg tablet Take 1 Tab by mouth daily for 90 days. Patient taking differently: Take 5 mg by mouth nightly. 1/22/19 4/22/19 Yes Breanne Gotti MD   finasteride (PROSCAR) 5 mg tablet Take 1 Tab by mouth daily for 90 days. Patient taking differently: Take 5 mg by mouth nightly. 1/22/19 4/22/19 Yes Breanne Gotti MD   terazosin (HYTRIN) 10 mg capsule Take 10 mg by mouth nightly. Yes Provider, Historical   hydrochlorothiazide (HYDRODIURIL) 25 mg tablet Take 25 mg by mouth daily.    Yes Provider, Historical   cholecalciferol, VITAMIN D3, (VITAMIN D3) 5,000 unit tab tablet Take 5,000 Units by mouth every seven (7) days. Provider, Historical   diclofenac EC (VOLTAREN) 75 mg EC tablet Take 75 mg by mouth daily. Provider, Historical   tadalafil (CIALIS) 5 mg tablet Take 1 Tab by mouth daily. Patient taking differently: Take 5 mg by mouth nightly. 1/22/19   Pam Segura MD   tadalafil (CIALIS) 5 mg tablet Take 1 Tab by mouth daily. Patient taking differently: Take 5 mg by mouth nightly.  11/9/16   Casie Painting MD         Current Facility-Administered Medications:     tuberculin injection 5 Units, 5 Units, IntraDERMal, ONCE, Thierry Moscoso MD, 5 Units at 03/06/19 0915    finasteride (PROSCAR) tablet 5 mg, 5 mg, Oral, QHS, Jonelle Bird    [START ON 3/7/2019] hydroCHLOROthiazide (HYDRODIURIL) tablet 25 mg, 25 mg, Oral, DAILY, FRANSISCO Bird    [START ON 3/7/2019] tamsulosin (FLOMAX) capsule 0.4 mg, 0.4 mg, Oral, DAILY, FRANSISCO Bird    terazosin (HYTRIN) capsule 10 mg, 10 mg, Oral, QHS, FRANSISCO Bird    alcohol 62% (NOZIN) nasal  1 Ampule, 1 Ampule, Topical, Q12H, FRANSISCO Bird    0.9% sodium chloride infusion, 100 mL/hr, IntraVENous, CONTINUOUS, FRANSISCO Bird    sodium chloride (NS) flush 5-40 mL, 5-40 mL, IntraVENous, Q8H, FRANSISCO Bird    sodium chloride (NS) flush 5-40 mL, 5-40 mL, IntraVENous, PRN, FRANSISCO Bird    ceFAZolin (ANCEF) 2 g/20 mL in sterile water IV syringe, 2 g, IntraVENous, Q8H, FRANSISCO Bird    acetaminophen (OFIRMEV) infusion 1,000 mg, 1,000 mg, IntraVENous, ONCE, Jonelle Bird    [START ON 3/7/2019] acetaminophen (TYLENOL) tablet 1,000 mg, 1,000 mg, Oral, Q6H, FRANSISCO Bird    celecoxib (CELEBREX) capsule 200 mg, 200 mg, Oral, Q12H, FRANSISCO Bird    HYDROmorphone (DILAUDID) tablet 2 mg, 2 mg, Oral, Q4H PRN, FRANSISCO Bird    HYDROmorphone (PF) (DILAUDID) injection 1 mg, 1 mg, IntraVENous, Q3H PRN, FRANSISCO Lockhart    naloxone [facility]) injection 0.2-0.4 mg, 0.2-0.4 mg, IntraVENous, Q10MIN PRN, FRANSISCO Lockhart    [START ON 3/7/2019] dexamethasone (DECADRON) injection 10 mg, 10 mg, IntraVENous, ONCE, FRANSISCO Lockhart    ondansetron [facility] injection 4 mg, 4 mg, IntraVENous, Q4H PRN, FRANSISCO Lockhart    diphenhydrAMINE (BENADRYL) capsule 25 mg, 25 mg, Oral, Q4H PRN, FRANSISCO Lockhart    [START ON 3/7/2019] senna-docusate (PERICOLACE) 8.6-50 mg per tablet 2 Tab, 2 Tab, Oral, DAILY, FRANSISCO Lockhart    aspirin delayed-release tablet 81 mg, 81 mg, Oral, Q12H, FRANSISCO Lockhart    Visit Vitals  BP (P) 147/78 (BP 1 Location: Right arm, BP Patient Position: At rest)   Pulse (!) (P) 58   Temp (P) 97.3 °F (36.3 °C)   Resp (P) 16   Ht 6' 3\" (1.905 m)   Wt 108.9 kg (239 lb 16 oz)   SpO2 99%   BMI 30.00 kg/m²        Recent Results (from the past 24 hour(s))   TYPE & SCREEN    Collection Time: 03/06/19  9:34 AM   Result Value Ref Range    Crossmatch Expiration 03/09/2019     ABO/Rh(D) A NEGATIVE     Antibody screen NEG    GLUCOSE, POC    Collection Time: 03/06/19  9:50 AM   Result Value Ref Range    Glucose (POC) 125 (H) 65 - 100 mg/dL

## 2019-03-06 NOTE — PERIOP NOTES
TRANSFER - IN REPORT:    Verbal report received from [facility] on Kandi Nuno  being received from ortho for routine progression of care      Report consisted of patients Situation, Background, Assessment and   Recommendations(SBAR). Information from the following report(s) SBAR was reviewed with the receiving nurse. Opportunity for questions and clarification was provided. Assessment completed upon patients arrival to unit and care assumed.

## 2019-03-06 NOTE — ANESTHESIA PROCEDURE NOTES
Peripheral Block    Start time: 3/6/2019 10:28 AM  End time: 3/6/2019 10:30 AM  Performed by: Becca Barnes MD  Authorized by: Becca Barnes MD       Pre-procedure: Indications: at surgeon's request and post-op pain management    Preanesthetic Checklist: patient identified, risks and benefits discussed, site marked, timeout performed, anesthesia consent given and patient being monitored    Timeout Time: 10:28          Block Type:   Block Type:   Adductor canal  Laterality:  Right  Monitoring:  Standard ASA monitoring, continuous pulse ox, frequent vital sign checks, heart rate, oxygen and responsive to questions  Injection Technique:  Single shot  Procedures: ultrasound guided    Patient Position: supine  Prep: chlorhexidine    Location:  Mid thigh  Needle Type:  Stimuplex  Needle Gauge:  21 G  Needle Localization:  Anatomical landmarks and ultrasound guidance    Assessment:  Number of attempts:  1  Injection Assessment:  Incremental injection every 5 mL, local visualized surrounding nerve on ultrasound, negative aspiration for blood, no paresthesia, no intravascular symptoms and ultrasound image on chart  Patient tolerance:  Patient tolerated the procedure well with no immediate complications

## 2019-03-07 LAB
ANION GAP SERPL CALC-SCNC: 7 MMOL/L
BUN SERPL-MCNC: 23 MG/DL (ref 8–23)
CALCIUM SERPL-MCNC: 8.1 MG/DL (ref 8.3–10.4)
CHLORIDE SERPL-SCNC: 105 MMOL/L (ref 98–107)
CO2 SERPL-SCNC: 27 MMOL/L (ref 21–32)
CREAT SERPL-MCNC: 1.25 MG/DL (ref 0.8–1.5)
GLUCOSE SERPL-MCNC: 165 MG/DL (ref 65–100)
HGB BLD-MCNC: 11.2 G/DL (ref 13.6–17.2)
MM INDURATION POC: 0 MM (ref 0–5)
POTASSIUM SERPL-SCNC: 3.8 MMOL/L (ref 3.5–5.1)
PPD POC: NORMAL NEGATIVE
SODIUM SERPL-SCNC: 139 MMOL/L (ref 136–145)

## 2019-03-07 PROCEDURE — 74011250637 HC RX REV CODE- 250/637: Performed by: PHYSICIAN ASSISTANT

## 2019-03-07 PROCEDURE — 97110 THERAPEUTIC EXERCISES: CPT

## 2019-03-07 PROCEDURE — 80048 BASIC METABOLIC PNL TOTAL CA: CPT

## 2019-03-07 PROCEDURE — 97150 GROUP THERAPEUTIC PROCEDURES: CPT

## 2019-03-07 PROCEDURE — 74011250636 HC RX REV CODE- 250/636: Performed by: PHYSICIAN ASSISTANT

## 2019-03-07 PROCEDURE — 97116 GAIT TRAINING THERAPY: CPT

## 2019-03-07 PROCEDURE — 97535 SELF CARE MNGMENT TRAINING: CPT

## 2019-03-07 PROCEDURE — 94760 N-INVAS EAR/PLS OXIMETRY 1: CPT

## 2019-03-07 PROCEDURE — 85018 HEMOGLOBIN: CPT

## 2019-03-07 PROCEDURE — 36415 COLL VENOUS BLD VENIPUNCTURE: CPT

## 2019-03-07 PROCEDURE — 97165 OT EVAL LOW COMPLEX 30 MIN: CPT

## 2019-03-07 PROCEDURE — 97161 PT EVAL LOW COMPLEX 20 MIN: CPT

## 2019-03-07 PROCEDURE — 65270000029 HC RM PRIVATE

## 2019-03-07 RX ADMIN — HYDROMORPHONE HYDROCHLORIDE 2 MG: 2 TABLET ORAL at 11:34

## 2019-03-07 RX ADMIN — Medication 1 AMPULE: at 08:08

## 2019-03-07 RX ADMIN — HYDROMORPHONE HYDROCHLORIDE 2 MG: 2 TABLET ORAL at 17:31

## 2019-03-07 RX ADMIN — TERAZOSIN HYDROCHLORIDE 10 MG: 5 CAPSULE ORAL at 21:16

## 2019-03-07 RX ADMIN — Medication 2 G: at 03:37

## 2019-03-07 RX ADMIN — ASPIRIN 81 MG: 81 TABLET ORAL at 21:17

## 2019-03-07 RX ADMIN — DIPHENHYDRAMINE HYDROCHLORIDE 25 MG: 25 CAPSULE ORAL at 21:17

## 2019-03-07 RX ADMIN — ACETAMINOPHEN 1000 MG: 500 TABLET, FILM COATED ORAL at 17:31

## 2019-03-07 RX ADMIN — TAMSULOSIN HYDROCHLORIDE 0.4 MG: 0.4 CAPSULE ORAL at 08:09

## 2019-03-07 RX ADMIN — HYDROCHLOROTHIAZIDE 25 MG: 25 TABLET ORAL at 08:09

## 2019-03-07 RX ADMIN — ACETAMINOPHEN 1000 MG: 500 TABLET, FILM COATED ORAL at 11:34

## 2019-03-07 RX ADMIN — HYDROMORPHONE HYDROCHLORIDE 2 MG: 2 TABLET ORAL at 02:04

## 2019-03-07 RX ADMIN — ACETAMINOPHEN 1000 MG: 500 TABLET, FILM COATED ORAL at 00:01

## 2019-03-07 RX ADMIN — FINASTERIDE 5 MG: 5 TABLET, FILM COATED ORAL at 21:17

## 2019-03-07 RX ADMIN — HYDROMORPHONE HYDROCHLORIDE 2 MG: 2 TABLET ORAL at 05:43

## 2019-03-07 RX ADMIN — ASPIRIN 81 MG: 81 TABLET ORAL at 08:09

## 2019-03-07 RX ADMIN — CELECOXIB 200 MG: 200 CAPSULE ORAL at 21:17

## 2019-03-07 RX ADMIN — SENNOSIDES AND DOCUSATE SODIUM 2 TABLET: 8.6; 5 TABLET ORAL at 08:09

## 2019-03-07 RX ADMIN — CELECOXIB 200 MG: 200 CAPSULE ORAL at 08:09

## 2019-03-07 RX ADMIN — Medication 1 AMPULE: at 21:17

## 2019-03-07 RX ADMIN — ACETAMINOPHEN 1000 MG: 500 TABLET, FILM COATED ORAL at 05:43

## 2019-03-07 RX ADMIN — HYDROMORPHONE HYDROCHLORIDE 2 MG: 2 TABLET ORAL at 21:17

## 2019-03-07 NOTE — PROGRESS NOTES
Problem: Self Care Deficits Care Plan (Adult)  Goal: *Acute Goals and Plan of Care (Insert Text)  GOALS:   DISCHARGE GOALS (in preparation for going home/rehab):  3 days  1. Mr. Fadumo Hoffman will perform one lower body dressing activity with minimal assistance required to demonstrate improved functional mobility and safety. -GOAL MET 3/7/2019  2. Mr. Fadumo Hoffman will perform one lower body bathing activity with minimal assistance required to demonstrate improved functional mobility and safety. -GOAL MET 3/7/2019   3. Mr. Fadumo Hoffman will perform toileting/toilet transfer with contact guard assistance to demonstrate improved functional mobility and safety. -GOAL MET 3/7/2019   4. Mr. Fadumo Hoffman will perform shower transfer with contact guard assistance to demonstrate improved functional mobility and safety. -GOAL MET 3/7/2019       JOINT CAMP OCCUPATIONAL THERAPY TKA: Initial Assessment, Discharge and Treatment Day: Day of Assessment and 1st 3/7/2019  INPATIENT: Hospital Day: 2  [place] / [place] / Product Type: Medicare /      NAME/AGE/GENDER: Lazarus Client is a 75 y.o. male   PRIMARY DIAGNOSIS:  Primary osteoarthritis of right knee [M17.11]   Procedure(s) and Anesthesia Type:     * KNEE ARTHROPLASTY TOTAL/ RIGHT/ CHRISTOPHER/ FNB - Spinal (Right)  ICD-10: Treatment Diagnosis:    · Pain in Right Knee (M25.561)  · Stiffness of Right Knee, Not elsewhere classified (F83.549)      ASSESSMENT:     Mr. Fadumo Hoffman is s/p Right TKA and presents with decreased weight bearing on R LE and decreased independence with functional mobility and activities of daily living as compared to baseline level of function and safety. After initial evaluation trudy would benefit from skilled Occupational Therapy to maximize independence and safety with self-care task and functional mobility.      Treatment: Patient completed shower and dressing as charter below in ADL grid and is ambulating with rolling walker and stand by assist.  Patient has met 4/4 goals and plans to return home with good family support. Family able to provide patient with appropriate level of assistance at this time. D/C OT for acute deficits related to new TKA. This section established at most recent assessment   PROBLEM LIST (Impairments causing functional limitations):  1. Decreased Strength  2. Decreased ADL/Functional Activities  3. Decreased Transfer Abilities  4. Increased Pain  5. Increased Fatigue  6. Decreased Flexibility/Joint Mobility  7. Decreased Knowledge of Precautions   INTERVENTIONS PLANNED: (Benefits and precautions of occupational therapy have been discussed with the patient.)  1. Activities of daily living training  2. Adaptive equipment training  3. Balance training  4. Clothing management  5. Donning&doffing training  6. Theraputic activity     TREATMENT PLAN: Frequency/Duration: Follow patient 1tx to address above goals. Rehabilitation Potential For Stated Goals: Excellent     RECOMMENDED REHABILITATION/EQUIPMENT: (at time of discharge pending progress): Continue Skilled Therapy. OCCUPATIONAL PROFILE AND HISTORY:   History of Present Injury/Illness (Reason for Referral): Pt presents this date s/p (right) TKA. Past Medical History/Comorbidities:   Mr. 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 Mr. Jasmin Daly  has a past surgical history that includes hx tonsillectomy; hx other surgical (2002); hx hip replacement (Right, 10/2011); hx other surgical; and hx back surgery.   Social History/Living Environment:   Home Environment: Private residence  # Steps to Enter: 1  Rails to Enter: No  One/Two Story Residence: One story  Living Alone: No  Support Systems: Spouse/Significant Other/Partner  Patient Expects to be Discharged to[de-identified] Private residence  Current DME Used/Available at Home: Cane, straight, Raised toilet seat, Shower chair, Walker, rolling  Tub or Shower Type: Shower  Prior Level of Function/Work/Activity:  Independent prior. Number of Personal Factors/Comorbidities that affect the Plan of Care: Brief history (0):  LOW COMPLEXITY   ASSESSMENT OF OCCUPATIONAL PERFORMANCE[de-identified]   Most Recent Physical Functioning:   Balance  Sitting: Intact  Standing: With support       Gross Assessment  AROM: Generally decreased, functional(L LE)  Strength: Generally decreased, functional(L LE)            Coordination  Fine Motor Skills-Upper: Left Intact; Right Intact  Gross Motor Skills-Upper: Left Intact; Right Intact         Mental Status  Neurologic State: Alert  Orientation Level: Oriented X4  Cognition: Appropriate decision making  Perception: Appears intact          RLE Strength  R Hip Flexion: 2+  R Knee Flexion: 2+  R Knee Extension: 2+     Basic ADLs (From Assessment) Complex ADLs (From Assessment)   Basic ADL  Feeding: Independent  Oral Facial Hygiene/Grooming: Setup  Bathing: Stand-by assistance  Type of Bath: Chlorhexidine (CHG), Full, Shower  Upper Body Dressing: Setup  Lower Body Dressing: Minimum assistance  Toileting: Stand by assistance     Grooming/Bathing/Dressing Activities of Daily Living   Grooming  Grooming Assistance: Supervision/set up     Upper Body Bathing  Bathing Assistance: Supervision/set-up     Lower Body Bathing  Bathing Assistance: Stand-by assistance     Upper Body Dressing Assistance  Dressing Assistance: Supervision/set-up Functional Transfers  Bathroom Mobility: Stand-by assistance  Toilet Transfer : Stand-by assistance  Shower Transfer: Stand-by assistance   Lower Body Dressing Assistance  Dressing Assistance: Minimum assistance  Underpants: Stand-by assistance  Pants With Elastic Waist: Stand-by assistance  Socks: Minimum assistance  Slip on Shoes with Back: Minimum assistance Bed/Mat Mobility  Supine to Sit: Stand-by assistance  Sit to Stand: Stand-by assistance  Bed to Chair: Stand-by assistance         Physical Skills Involved:  1. Range of Motion  2. Balance  3. Strength Cognitive Skills Affected (resulting in the inability to perform in a timely and safe manner):  1. [facility] Psychosocial Skills Affected: 1. wfl   Number of elements that affect the Plan of Care: 1-3:  LOW COMPLEXITY   CLINICAL DECISION MAKING:   [facility] AM-[facility]AC 6 Clicks   Daily Activity Inpatient Short Form  How much help from another person does the patient currently need. .. Total A Lot A Little None   1. Putting on and taking off regular lower body clothing? [] 1   [] 2   [x] 3   [] 4   2. Bathing (including washing, rinsing, drying)? [] 1   [] 2   [x] 3   [] 4   3. Toileting, which includes using toilet, bedpan or urinal?   [] 1   [] 2   [x] 3   [] 4   4. Putting on and taking off regular upper body clothing? [] 1   [] 2   [] 3   [x] 4   5. Taking care of personal grooming such as brushing teeth? [] 1   [] 2   [] 3   [x] 4   6. Eating meals? [] 1   [] 2   [] 3   [x] 4   © 2007, Trustees of [facility], under license to [employer]. All rights reserved     Score:  Initial: 21 Most Recent: X (Date: -- )    Interpretation of Tool:  Represents activities that are increasingly more difficult (i.e. Bed mobility, Transfers, Gait). Medical Necessity:     · Skilled intervention continues to be required due to Deficits listed above. Reason for Services/Other Comments:  · Patient continues to require skilled intervention due to new TKA.    Use of outcome tool(s) and clinical judgement create a POC that gives a: MODERATE COMPLEXITY            TREATMENT:   (In addition to Assessment/Re-Assessment sessions the following treatments were rendered)     Pre-treatment Symptoms/Complaints: Pain: Initial:   Pain Intensity 1: 4  Pain Location 1: Knee  Pain Orientation 1: Right  Post Session:  4     Self Care: (35): Procedure(s) (per grid) utilized to improve and/or restore self-care/home management as related to dressing, bathing and toileting. Required minimal verbal and   cueing to facilitate activities of daily living skills. Initial evaluation 13 minutes. Treatment/Session Assessment:     Response to Treatment:  Good, sitting up in recliner. Education:  [] Home Exercises  [x] Fall Precautions  [] Hip Precautions [] Going Home Video  [x] Knee/Hip Prosthesis Review  [x] Walker Management/Safety [x] Adaptive Equipment as Needed       Interdisciplinary Collaboration:   o Physical Therapist  o Occupational Therapist  o Registered Nurse    After treatment position/precautions:   o Up in chair  o Bed/Chair-wheels locked  o Caregiver at bedside  o Call light within reach  o RN notified     Compliance with Program/Exercises: Compliant all of the time. Recommendations/Intent for next treatment session:  D/C OT for acute deficits.       Total Treatment Duration:  OT Patient Time In/Time Out  Time In: 0900  Time Out: PINA Greene

## 2019-03-07 NOTE — PROGRESS NOTES
2019         Post Op day: 1 Day Post-OpProcedure(s) (LRB):  KNEE ARTHROPLASTY TOTAL/ RIGHT/ CHRISTOPHER/ FNB (Right)      Admit Date: 3/6/2019  Admit Diagnosis: Primary osteoarthritis of right knee [M17.11]  Osteoarthritis of right knee [M17.11]  Osteoarthritis [M19.90]    LAB:    Recent Results (from the past 24 hour(s))   TYPE & SCREEN    Collection Time: 19  9:34 AM   Result Value Ref Range    Crossmatch Expiration 2019     ABO/Rh(D) A NEGATIVE     Antibody screen NEG    GLUCOSE, POC    Collection Time: 19  9:50 AM   Result Value Ref Range    Glucose (POC) 125 (H) 65 - 100 mg/dL   HEMOGLOBIN    Collection Time: 19  7:16 PM   Result Value Ref Range    HGB 12.9 (L) 13.6 - 17.2 g/dL   HEMOGLOBIN    Collection Time: 19  4:09 AM   Result Value Ref Range    HGB 11.2 (L) 13.6 - 26.3 g/dL   METABOLIC PANEL, BASIC    Collection Time: 19  4:09 AM   Result Value Ref Range    Sodium 139 136 - 145 mmol/L    Potassium 3.8 3.5 - 5.1 mmol/L    Chloride 105 98 - 107 mmol/L    CO2 27 21 - 32 mmol/L    Anion gap 7 mmol/L    Glucose 165 (H) 65 - 100 mg/dL    BUN 23 8 - 23 MG/DL    Creatinine 1.25 0.8 - 1.5 MG/DL    GFR est AA >60 >60 ml/min/1.73m2    GFR est non-AA 60 ml/min/1.73m2    Calcium 8.1 (L) 8.3 - 10.4 MG/DL     Vital Signs:    Patient Vitals for the past 8 hrs:   BP Temp Pulse Resp SpO2   19 0355 131/70 98.1 °F (36.7 °C) 63 16 96 %     Temp (24hrs), Av.8 °F (36.6 °C), Min:97.3 °F (36.3 °C), Max:98.1 °F (36.7 °C)    Body mass index is 30 kg/m².   Pain Control:   Pain Assessment  Pain Scale 1: Numeric (0 - 10)  Pain Intensity 1: 7  Pain Onset 1: years   Pain Location 1: Knee  Pain Orientation 1: Right  Pain Description 1: Constant  Pain Intervention(s) 1: Medication (see MAR)    Subjective: Doing well, No complaints, No SOB, No Chest Pain, No Nausea or Vomitting     Objective: Vital Signs are Stable, No Acute Distress, Alert and Oriented, Dressing is Dry,  Neurovascular exam is normal.       PT/OT:            Assistive Device: Walker (comment)                Weight Bearing Status: WBAT    Meds:  [unfilled]  [unfilled]  [unfilled]    Assessment:   Patient Active Problem List   Diagnosis Code    Osteoarthritis of hip M16.9    S/P prosthetic total arthroplasty of the hip Z96.649    HTN (hypertension) I10    BPH (benign prostatic hypertrophy) N40.0    Osteoarthritis of right knee M17.11    Osteoarthritis M19.90    Status post total knee replacement, right Z96.651             Plan: Continue Physical Therapy, Monitor  Hbg, home tomorrow.         Signed By: Vera Pike MD

## 2019-03-07 NOTE — PROGRESS NOTES
Problem: Mobility Impaired (Adult and Pediatric)  Goal: *Acute Goals and Plan of Care (Insert Text)  GOALS (1-4 days):  (1.)Mr. Jason Henriquez will move from supine to sit and sit to supine  in bed with SUPERVISION. (2.)Mr. Jason Henriquez will transfer from bed to chair and chair to bed with SUPERVISION using the least restrictive device. (3.)Mr. Jason Henriquez will ambulate with SUPERVISION for 250 feet with the least restrictive device. (4.)Mr. Jason Henriquez will ambulate up/down 1 step without a railing with STAND BY ASSIST with walker. (5.)Mr. Jason Henriquez will increase right knee ROM to 5°-80°.  ________________________________________________________________________________________________      PHYSICAL THERAPY Joint camp tKa: Initial Assessment, Treatment Day: Day of Assessment, AM 3/7/2019  INPATIENT: Hospital Day: 2  [place] / Product Type: Medicare /      NAME/AGE/GENDER: Hung Miller is a 75 y.o. male   PRIMARY DIAGNOSIS:  Primary osteoarthritis of right knee [M17.11]   Procedure(s) and Anesthesia Type:     * KNEE ARTHROPLASTY TOTAL/ RIGHT/ CHRISTOPHER/ FNB - Spinal (Right)  ICD-10: Treatment Diagnosis:    · Pain in Right Knee (M25.561)  · Stiffness of Right Knee, Not elsewhere classified (M25.661)  · Difficulty in walking, Not elsewhere classified (R26.2)      ASSESSMENT:     Mr. Jason Henriquez presents s/p R TKA. Patient demonstrates decreased R LE strength and ROM and decreased independence with mobility. Patient would benefit from therapy to address these deficits. Patient has a history of CMT, back pain, and R JEREMIAS. He plans on returning home at d/c with assist from his spouse. This section established at most recent assessment   PROBLEM LIST (Impairments causing functional limitations):  1. Decreased Strength  2. Decreased ADL/Functional Activities  3. Decreased Transfer Abilities  4. Decreased Ambulation Ability/Technique  5. Decreased Balance  6. Increased Pain  7.  Decreased Flexibility/Joint Mobility  8. Edema/Girth  9. Decreased [place] with Home Exercise Program   INTERVENTIONS PLANNED: (Benefits and precautions of physical therapy have been discussed with the patient.)  1. Bed Mobility  2. Gait Training  3. Home Exercise Program (HEP)  4. Therapeutic Exercise/Strengthening  5. Transfer Training  6. Range of Motion: active/assisted/passive  7. Therapeutic Activities  8. Group Therapy     TREATMENT PLAN: Frequency/Duration: Follow patient BID for duration of hospital stay to address above goals. Rehabilitation Potential For Stated Goals: Good     RECOMMENDED REHABILITATION/EQUIPMENT: (at time of discharge pending progress): Continue Skilled Therapy and Home Health: Physical Therapy. HISTORY:   History of Present Injury/Illness (Reason for Referral):  R TKA  Past Medical History/Comorbidities:   Mr. 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 Mr. Stephanie Grande  has a past surgical history that includes hx tonsillectomy; hx other surgical (2002); hx hip replacement (Right, 10/2011); hx other surgical; and hx back surgery. Social History/Living Environment:   Home Environment: Private residence  # Steps to Enter: 1  Rails to Enter: No  One/Two Story Residence: One story  Living Alone: No  Support Systems: Spouse/Significant Other/Partner  Patient Expects to be Discharged to[de-identified] Private residence  Current DME Used/Available at Home: Cane, straight, Raised toilet seat, Shower chair, Walker, rolling  Tub or Shower Type: Shower  Prior Level of Function/Work/Activity:  Ambulating with a cane.    Number of Personal Factors/Comorbidities that affect the Plan of Care: 1-2: MODERATE COMPLEXITY   EXAMINATION:   Most Recent Physical Functioning:      Gross Assessment  AROM: Generally decreased, functional(L LE)  Strength: Generally decreased, functional(L LE)                RLE Strength  R Hip Flexion: 2+  R Knee Flexion: 2+  R Knee Extension: 2+    Bed Mobility  Supine to Sit: Stand-by assistance    Transfers  Sit to Stand: Contact guard assistance  Stand to Sit: Contact guard assistance  Bed to Chair: Contact guard assistance    Balance  Sitting: Intact  Standing: With support    Posture  Posture Assessment: Trunk flexion         Weight Bearing Status  Right Side Weight Bearing: As tolerated  Distance (ft): 80 Feet (ft)  Ambulation - Level of Assistance: Contact guard assistance  Assistive Device: Walker, rolling  Base of Support: Center of gravity altered  Speed/Jennie: Pace decreased (<100 feet/min)  Step Length: Left shortened  Stance: Right decreased  Gait Abnormalities: Antalgic  Interventions: Safety awareness training;Verbal cues     Braces/Orthotics: none    Right Knee Cold  Type: Cryocuff      Body Structures Involved:  1. Joints  2. Muscles Body Functions Affected:  1. Movement Related Activities and Participation Affected:  1. Mobility  2. Self Care   Number of elements that affect the Plan of Care: 4+: HIGH COMPLEXITY   CLINICAL PRESENTATION:   Presentation: Stable and uncomplicated: LOW COMPLEXITY   CLINICAL DECISION MAKING:   [facility] AM-[facility] 6 Clicks   Basic Mobility Inpatient Short Form  How much difficulty does the patient currently have. .. Unable A Lot A Little None   1. Turning over in bed (including adjusting bedclothes, sheets and blankets)? [] 1   [] 2   [x] 3   [] 4   2. Sitting down on and standing up from a chair with arms ( e.g., wheelchair, bedside commode, etc.)   [] 1   [] 2   [x] 3   [] 4   3. Moving from lying on back to sitting on the side of the bed?    [] 1   [] 2   [x] 3   [] 4   How much help from another person does the patient currently need. .. Total A Lot A Little None   4. Moving to and from a bed to a chair (including a wheelchair)? [] 1   [] 2   [x] 3   [] 4   5. Need to walk in hospital room? [] 1   [] 2   [x] 3   [] 4   6. Climbing 3-5 steps with a railing? [] 1   [] 2   [x] 3   [] 4   © 2007, Trustees of [facility], under license to [employer]. All rights reserved        Score:  Initial: 18 Most Recent: X (Date: -- )    Interpretation of Tool:  Represents activities that are increasingly more difficult (i.e. Bed mobility, Transfers, Gait). Medical Necessity:     · Patient is expected to demonstrate progress in strength, range of motion, balance and coordination to increase independence with mobility, ADLs, and HEP. Reason for Services/Other Comments:  · Patient continues to require skilled intervention due to decreased R LE strength and ROM and decreased independence with mobility. Use of outcome tool(s) and clinical judgement create a POC that gives a: Clear prediction of patient's progress: LOW COMPLEXITY            TREATMENT:   (In addition to Assessment/Re-Assessment sessions the following treatments were rendered)     Pre-treatment Symptoms/Complaints:  Patient reports he didn't sleep well last night. Pain: Initial:   Pain Intensity 1: 4  Pain Location 1: Knee  Pain Orientation 1: Right  Pain Intervention(s) 1: Ambulation/Increased Activity, Cold pack, Exercise  Post Session:  4/10     Gait Training ( ):  Gait training to improve and/or restore physical functioning as related to mobility, balance and coordination. Ambulated 80 Feet (ft) with Contact guard assistance using a Walker, rolling and minimal Safety awareness training;Verbal cues related to their stance phase and stride length to promote proper body alignment. Therapeutic Exercise: (15 Minutes):  Exercises per grid below to improve mobility and strength. Required minimal verbal and tactile cues to promote proper body alignment. Progressed range and repetitions as indicated. Date:  3/7/19 Date:   Date:     ACTIVITY/EXERCISE AM PM AM PM AM PM   GROUP THERAPY  []  []  []  []  []  []   Ankle Pumps 15        Quad Sets 15        Gluteal Sets 15        Hip ABd/ADduction 15        Straight Leg Raises 15        Knee Slides 15        Short Arc Quads 15        Long Arc Quads         Chair Slides                  B = bilateral; AA = active assistive; A = active; P = passive      Treatment/Session Assessment:     Response to Treatment:  Patient participated well. Lots of questions-all answered to patient's satisfaction. Education:  [x] Home Exercises  [x] Fall Precautions  [] Hip Precautions [] D/C Instruction Review  [] Knee/Hip Prosthesis Review  [x] Walker Management/Safety [] Adaptive Equipment as Needed       Interdisciplinary Collaboration:   o Physical Therapist  o Registered Nurse    After treatment position/precautions:   o Up in chair  o Bed/Chair-wheels locked  o Caregiver at bedside  o Call light within reach    Compliance with Program/Exercises: Will assess as treatment progresses. Recommendations/Intent for next treatment session:  Treatment next visit will focus on increasing Mr. Seymours independence with bed mobility, transfers, gait training, strength/ROM exercises, modalities for pain, and patient education.       Total Treatment Duration:  PT Patient Time In/Time Out  Time In: 0820  Time Out: [address] [facility] MARILY Gomez

## 2019-03-07 NOTE — PROGRESS NOTES
Care Management Interventions  Mode of Transport at Discharge: Self  Transition of Care Consult (CM Consult): [address]: Yes  Discharge Durable Medical Equipment: No  Physical Therapy Consult: Yes  Occupational Therapy Consult: Yes  Current Support Network: Lives with Spouse  Confirm Follow Up Transport: Family  Plan discussed with Pt/Family/Caregiver: Yes  Freedom of Choice Offered: Yes  Discharge Location  Discharge Placement: Home with home health    Patient is a 75y.o. year old male admitted for Right TKA . Patient lives with His spouse and plans to return home on discharge. Order received to arrange home health. Patient without preference towards agency. Referral sent to [facility]. Patient has a walker and raised toilet seat. Will follow until discharge.

## 2019-03-07 NOTE — PROGRESS NOTES
In recliner eating. Has been up to BR, voided without difficulty. Medicated for pain with dilaudid po and tylenol again. NV checks remain WDL. No further changes. Wife in room.

## 2019-03-07 NOTE — PROGRESS NOTES
Shift assessment completed. Pt is alert & oriented x4. Able to verbalize needs. Resting quietly with no distress noted. Dressing to right knee is clean, dry and intact. Gemma Jerauld in place. Neurovascular and peripheral vascular checks WNL. Patient is able to dorsi/planter flex bilaterally with +2 pedal pulses. [place]on draining without difficulty. Incentive spirometry at bedside. Patient encouraged to use hourly x 10 repetitions. Pain is being managed with Dilaudid, patient tolerating well. Bed locked and lowered. Call light within reach. Patient instructed to call for assistance. Pt verbalizes understanding. Will monitor.

## 2019-03-07 NOTE — PROGRESS NOTES
[address] [facility]  Face to Face Encounter    Patients Name: Aurelio Brown    YOB: 1945    Ordering Physician: Tiffany Jimenez    Primary Diagnosis: Primary osteoarthritis of right knee [M17.11]  Osteoarthritis of right knee [M17.11]  Osteoarthritis [M19.90]  S/p right tKA    Date of Face to Face:   3-6-19                                  Face to Face Encounter findings are related to primary reason for home care:   yes. 1. I certify that the patient needs intermittent care as follows: physical therapy: gait/stair training    2. I certify that this patient is homebound, that is: 1) patient requires the use of a walker device, special transportation, or assistance of another to leave the home; or 2) patient's condition makes leaving the home medically contraindicated; and 3) patient has a normal inability to leave the home and leaving the home requires considerable and taxing effort. Patient may leave the home for infrequent and short duration for medical reasons, and occasional absences for non-medical reasons. Homebound status is due to the following functional limitations: Patient's ambulation limited secondary to severe pain and requires the use of an assistive device and the assistance of a caregiver for safe completion. Patient with strength and ROM deficits limiting ambulation endurance requiring the use of an assistive device and the assistance of a caregiver. Patient deemed temporarily homebound secondary to increased risk for infection when leaving home and going out into the community. 3. I certify that this patient is under my care and that I, or a nurse practitioner or  [id number], or clinical nurse specialist, or certified nurse midwife, working with me, had a Face-to-Face Encounter that meets the physician Face-to-Face Encounter requirements.   The following are the clinical findings from the [address] [place] [address] encounter that support the need for skilled services and is a summary of the encounter: see hospital chart        Marielos Zheng, BSW  3/7/2019      THE FOLLOWING TO BE COMPLETED BY THE COMMUNITY PHYSICIAN:    I concur with the findings described above from the F2F encounter that this patient is homebound and in need of a skilled service.     Certifying Physician: _____________________________________      Printed Certifying Physician Name: _____________________________________    Date: _________________

## 2019-03-07 NOTE — PROGRESS NOTES
Follow up from initial assessment    SOB evaluated: None noted    Breath Sounds: Clear    IS patient effort: Good  Patient achieved:1500  Ml/sec    Patient resting comfortably, denies any discomfort at this time. Patient placed on continuous sat monitor #03  HS per protocol. Monitor history deleted prior to placing on patient.  Patient working on IS

## 2019-03-07 NOTE — INTERDISCIPLINARY ROUNDS
Interdisciplinary team rounds were held 3/7/2019 with the following team members:Care Management, Nursing, Occupational Therapy and Physical Therapy and the patient. Plan of Care options were discussed with the team and the patient. The patient would benefit from a screening and/or visit from [address] [place] [address] [facility]

## 2019-03-07 NOTE — PROGRESS NOTES
Problem: Mobility Impaired (Adult and Pediatric)  Goal: *Acute Goals and Plan of Care (Insert Text)  GOALS (1-4 days):  (1.)Mr. Adam Dumont will move from supine to sit and sit to supine  in bed with SUPERVISION. (2.)Mr. Adam Dumont will transfer from bed to chair and chair to bed with SUPERVISION using the least restrictive device. (3.)Mr. Adam Dumont will ambulate with SUPERVISION for 250 feet with the least restrictive device. (4.)Mr. Adam Dumont will ambulate up/down 1 step without a railing with STAND BY ASSIST with walker. (5.)Mr. Adam Dumont will increase right knee ROM to 5°-80°.  ________________________________________________________________________________________________      PHYSICAL THERAPY Joint camp tKa: Daily Note, PM 3/7/2019  INPATIENT: Hospital Day: 2  [place] / [place] / Product Type: Medicare /      NAME/AGE/GENDER: Jacqueline Sawant is a 75 y.o. male   PRIMARY DIAGNOSIS:  Primary osteoarthritis of right knee [M17.11]   Procedure(s) and Anesthesia Type:     * KNEE ARTHROPLASTY TOTAL/ RIGHT/ CHRISTOPHER/ [facility]NB - Spinal (Right)  ICD-10: Treatment Diagnosis:    · Pain in Right Knee (M25.561)  · Stiffness of Right Knee, Not elsewhere classified (M25.661)  · Difficulty in walking, Not elsewhere classified (R26.2)      ASSESSMENT:     Mr. Adam Dumont presents s/p R TKA. Patient demonstrates decreased R LE strength and ROM and decreased independence with mobility. Patient would benefit from therapy to address these deficits. Patient has a history of CMT, back pain, and R JEREMIAS. He plans on returning home at d/c with assist from his spouse. Patient participated well with group session. Patient and spouse happy with how well he is doing. Patient with good demonstration of exercises and good knee flexion ROM. This section established at most recent assessment   PROBLEM LIST (Impairments causing functional limitations):  1. Decreased Strength  2.  Decreased ADL/Functional Activities  3. Decreased Transfer Abilities  4. Decreased Ambulation Ability/Technique  5. Decreased Balance  6. Increased Pain  7. Decreased Flexibility/Joint Mobility  8. Edema/Girth  9. Decreased Clay with Home Exercise Program   INTERVENTIONS PLANNED: (Benefits and precautions of physical therapy have been discussed with the patient.)  1. Bed Mobility  2. Gait Training  3. Home Exercise Program (HEP)  4. Therapeutic Exercise/Strengthening  5. Transfer Training  6. Range of Motion: active/assisted/passive  7. Therapeutic Activities  8. Group Therapy     TREATMENT PLAN: Frequency/Duration: Follow patient BID for duration of hospital stay to address above goals. Rehabilitation Potential For Stated Goals: Good     RECOMMENDED REHABILITATION/EQUIPMENT: (at time of discharge pending progress): Continue Skilled Therapy and Home Health: Physical Therapy. HISTORY:   History of Present Injury/Illness (Reason for Referral):  R TKA  Past Medical History/Comorbidities:   Mr. 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 Mr. Fadumo Hoffman  has a past surgical history that includes hx tonsillectomy; hx other surgical (2002); hx hip replacement (Right, 10/2011); hx other surgical; and hx back surgery.   Social History/Living Environment:   Home Environment: Private residence  # Steps to Enter: 1  Rails to Enter: No  One/Two Story Residence: One story  Living Alone: No  Support Systems: Spouse/Significant Other/Partner  Patient Expects to be Discharged to[de-identified] Private residence  Current DME Used/Available at Home: Cane, straight, Raised toilet seat, Shower chair, Walker, rolling  [facility]  Prior Level of Function/Work/Activity:  Ambulating with a cane. Number of Personal Factors/Comorbidities that affect the Plan of Care: 1-2: MODERATE COMPLEXITY   EXAMINATION:   Most Recent Physical Functioning:      Gross Assessment  AROM: Generally decreased, functional(L LE)  Strength: Generally decreased, functional(L LE)        RLE AROM  R Knee Flexion: 97  R Knee Extension: 7       RLE Strength  R Hip Flexion: 2+  R Knee Flexion: 2+  R Knee Extension: 2+    Bed Mobility  Supine to Sit: Stand-by assistance    Transfers  Sit to Stand: Stand-by assistance  Stand to Sit: Stand-by assistance  Bed to Chair: Stand-by assistance    Balance  Sitting: Intact  Standing: With support    Posture  Posture Assessment: Trunk flexion         Weight Bearing Status  Right Side Weight Bearing: As tolerated  Distance (ft): 284 Feet (ft)(x 2)  Ambulation - Level of Assistance: Stand-by assistance  Assistive Device: Walker, rolling  Base of Support: Center of gravity altered  Speed/Jennie: Pace decreased (<100 feet/min)  Step Length: Left shortened;Right shortened  Stance: Right decreased  Gait Abnormalities: Antalgic  Interventions: Safety awareness training;Verbal cues     Braces/Orthotics: none    Right Knee Cold  Type: Cryocuff      Body Structures Involved:  1. Joints  2. Muscles Body Functions Affected:  1. Movement Related Activities and Participation Affected:  1. Mobility  2. Self Care   Number of elements that affect the Plan of Care: 4+: HIGH COMPLEXITY   CLINICAL PRESENTATION:   Presentation: Stable and uncomplicated: LOW COMPLEXITY   CLINICAL DECISION MAKIN [facility] [address] [id number] AM-PAC 6 Clicks   Basic Mobility Inpatient Short Form  How much difficulty does the patient currently have. .. Unable A Lot A Little None   1. Turning over in bed (including adjusting bedclothes, sheets and blankets)? [] 1   [] 2   [x] 3   [] 4   2.   Sitting down on and standing up from a chair with arms ( e.g., wheelchair, bedside commode, etc.)   [] 1   [] 2   [x] 3   [] 4   3. Moving from lying on back to sitting on the side of the bed? [] 1   [] 2   [x] 3   [] 4   How much help from another person does the patient currently need. .. Total A Lot A Little None   4. Moving to and from a bed to a chair (including a wheelchair)? [] 1   [] 2   [x] 3   [] 4   5. Need to walk in hospital room? [] 1   [] 2   [x] 3   [] 4   6. Climbing 3-5 steps with a railing? [] 1   [] 2   [x] 3   [] 4   © 2007, Trustees of [facility] [employer], under license to [employer] All rights reserved        Score:  Initial: 18 Most Recent: X (Date: -- )    Interpretation of Tool:  Represents activities that are increasingly more difficult (i.e. Bed mobility, Transfers, Gait). Medical Necessity:     · Patient is expected to demonstrate progress in strength, range of motion, balance and coordination to increase independence with mobility, ADLs, and HEP. Reason for Services/Other Comments:  · Patient continues to require skilled intervention due to decreased R LE strength and ROM and decreased independence with mobility. Use of outcome tool(s) and clinical judgement create a POC that gives a: Clear prediction of patient's progress: LOW COMPLEXITY            TREATMENT:   (In addition to Assessment/Re-Assessment sessions the following treatments were rendered)     Pre-treatment Symptoms/Complaints:  Patient ready for group session. Pain: Initial:   Pain Intensity 1: 3  Pain Location 1: Knee  Pain Orientation 1: Right  Pain Intervention(s) 1: Ambulation/Increased Activity, Cold pack, Exercise  Post Session:  3/10     Gait Training (15 Minutes):  Gait training to improve and/or restore physical functioning as related to mobility, balance and coordination.   Ambulated 284 Feet (ft)(x 2) with Stand-by assistance using a Walker, rolling and minimal Safety awareness training;Verbal cues related to their stance phase and stride length to promote proper body alignment. Therapeutic Exercise: (45 Minutes(group)):  Exercises per grid below to improve mobility and strength. Required minimal verbal and tactile cues to promote proper body alignment. Progressed range and repetitions as indicated. Date:  3/7/19 Date:   Date:     ACTIVITY/EXERCISE AM PM AM PM AM PM   GROUP THERAPY  []  [x]  []  []  []  []   Ankle Pumps 15 15       Quad Sets 15 15       Gluteal Sets 15 15       Hip ABd/ADduction 15 15       Straight Leg Raises 15 15       Knee Slides 15 15       Short Arc Quads 15 15       Long Arc Quads         Chair Slides  15                B = bilateral; AA = active assistive; A = active; P = passive      Treatment/Session Assessment:     Response to Treatment:  Patient participated well. Moving well with good knee flexion ROM. Education:  [x] Home Exercises  [x] Fall Precautions  [] Hip Precautions [] D/C Instruction Review  [x] Knee/Hip Prosthesis Review  [x] Walker Management/Safety [] Adaptive Equipment as Needed       Interdisciplinary Collaboration:   o Physical Therapist  o Registered Nurse  o Rehabilitation Attendant    After treatment position/precautions:   o Up in chair  o Bed/Chair-wheels locked  o Caregiver at bedside  o Call light within reach    Compliance with Program/Exercises: Will assess as treatment progresses. Recommendations/Intent for next treatment session:  Treatment next visit will focus on increasing Mr. Yuan's independence with bed mobility, transfers, gait training, strength/ROM exercises, modalities for pain, and patient education.       Total Treatment Duration:  PT Patient Time In/Time Out  Time In: 1300  Time Out: [facility],

## 2019-03-07 NOTE — PROGRESS NOTES
Sitting up in bed. Moving LEs well. Aquacel dry and intact to R knee with iceman. SCDs on. No complaints. Appetite good. Wife in room.

## 2019-03-07 NOTE — PROGRESS NOTES
Had shower with OT assisting. Dressed and in recliner with iceman to knee. Medicated for pain with dilaudid po and tylenol.

## 2019-03-08 VITALS
SYSTOLIC BLOOD PRESSURE: 150 MMHG | HEART RATE: 70 BPM | BODY MASS INDEX: 29.84 KG/M2 | TEMPERATURE: 97.7 F | DIASTOLIC BLOOD PRESSURE: 83 MMHG | RESPIRATION RATE: 16 BRPM | OXYGEN SATURATION: 97 % | HEIGHT: 75 IN | WEIGHT: 240 LBS

## 2019-03-08 LAB
HGB BLD-MCNC: 9.8 G/DL (ref 13.6–17.2)
MM INDURATION POC: 0 MM (ref 0–5)
PPD POC: NORMAL NEGATIVE

## 2019-03-08 PROCEDURE — 85018 HEMOGLOBIN: CPT

## 2019-03-08 PROCEDURE — 74011250637 HC RX REV CODE- 250/637: Performed by: PHYSICIAN ASSISTANT

## 2019-03-08 PROCEDURE — 36415 COLL VENOUS BLD VENIPUNCTURE: CPT

## 2019-03-08 PROCEDURE — 97116 GAIT TRAINING THERAPY: CPT

## 2019-03-08 PROCEDURE — 97150 GROUP THERAPEUTIC PROCEDURES: CPT

## 2019-03-08 RX ADMIN — HYDROMORPHONE HYDROCHLORIDE 2 MG: 2 TABLET ORAL at 11:13

## 2019-03-08 RX ADMIN — HYDROMORPHONE HYDROCHLORIDE 2 MG: 2 TABLET ORAL at 01:59

## 2019-03-08 RX ADMIN — ACETAMINOPHEN 1000 MG: 500 TABLET, FILM COATED ORAL at 06:10

## 2019-03-08 RX ADMIN — Medication 1 AMPULE: at 08:09

## 2019-03-08 RX ADMIN — SENNOSIDES AND DOCUSATE SODIUM 2 TABLET: 8.6; 5 TABLET ORAL at 08:09

## 2019-03-08 RX ADMIN — ACETAMINOPHEN 1000 MG: 500 TABLET, FILM COATED ORAL at 00:05

## 2019-03-08 RX ADMIN — TAMSULOSIN HYDROCHLORIDE 0.4 MG: 0.4 CAPSULE ORAL at 08:09

## 2019-03-08 RX ADMIN — ASPIRIN 81 MG: 81 TABLET ORAL at 08:09

## 2019-03-08 RX ADMIN — Medication 10 ML: at 00:05

## 2019-03-08 RX ADMIN — ACETAMINOPHEN 1000 MG: 500 TABLET, FILM COATED ORAL at 11:13

## 2019-03-08 RX ADMIN — HYDROCHLOROTHIAZIDE 25 MG: 25 TABLET ORAL at 08:09

## 2019-03-08 RX ADMIN — CELECOXIB 200 MG: 200 CAPSULE ORAL at 08:09

## 2019-03-08 RX ADMIN — HYDROMORPHONE HYDROCHLORIDE 2 MG: 2 TABLET ORAL at 06:11

## 2019-03-08 NOTE — DISCHARGE INSTRUCTIONS
[facility]   Patient Discharge Instructions    Narciso Cushing / [id number] : 1945    Admitted 3/6/2019 Discharged: 3/6/2019     IF YOU HAVE ANY PROBLEMS ONCE YOU ARE AT HOME CALL THE FOLLOWING NUMBERS:   Main office number: [phone]      Medications    · The medications you are to continue on are listed on the medication reconciliation sheet. · Narcotic pain medications as well as supplemental iron can cause constipation. If this occurs try stopping the narcotic pain medication and/or the iron. · It is important that you take the medication exactly as they are prescribed. · Medications which increase your risk of blood clots are listed to stop for 5 weeks after surgery as well as medications or supplements which increase your risk of bleeding complications. · Keep your medication in the bottles provided by the pharmacist and keep a list of the medication names, dosages, and times to be taken in your wallet. · Do not take other medications without consulting your doctor. Important Information    Do NOT smoke as this will greatly increase your risk of infection! Resume your prehospital diet. If you have excessive nausea or vomitting call your doctor's office     Leg swelling and warmth is normal for 6 months after surgery. If you experience swelling in your leg elevate you leg while laying down with your toes above your heart. If you have sudden onset severe swelling with leg pain call our office. The stitches deep inside take approximately 6 months to dissolve. There will be sharp shooting, stinging and burning pain. This is normal and will resolve between 3-6 months after surgery. Difficulty sleeping is normal following total Knee and Hip replacement. You may try melatonin, an over-the-counter sleep aid or benadryl to help with sleep. Most patients will resume sleeping through the night 8 weeks after surgery. Home Physical Therapy is arranged.  Home [facility] will contact you within 48 hrs of discharge that you have chosen. If you have not received a call within this time frame please contact that provider you chose. You should be given this information before you leave the hospital.     You are at a risk for falls. Use the rolling walker when walking. Patients who have had a joint replacement should not drive if they are still taking narcotic pain mediation during the daytime hours. Most patients wean themselves off of pain medication within 2-5 weeks after surgery. When to Call the office    - If you have a temperature greater then 101  - Uncontrolled vomiting   - Loose control of your bladder or bowel function  - Are unable to bear any weight   - Need a pain medication refill       DISCHARGE SUMMARY from Nurse    The following personal items collected during your admission are returned to you:   Dental Appliance: Dental Appliances: None  Vision: Visual Aid: Glasses  Hearing Aid:   na  Jewelry: Jewelry: Ring(gold band keft hand ring finger)  Clothing: Clothing: At bedside  Other Valuables: Other Valuables: Wallet, Cell Phone(with wife)  Valuables sent to safe:   na    PATIENT INSTRUCTIONS:    After general anesthesia or intravenous sedation, for 24 hours or while taking prescription Narcotics:  · Limit your activities  · Do not drive and operate hazardous machinery  · Do not make important personal or business decisions  · Do  not drink alcoholic beverages  · If you have not urinated within 8 hours after discharge, please contact your surgeon on call.     Report the following to your surgeon:  · Excessive pain, swelling, redness or odor of or around the surgical area  · Temperature over 101  · Nausea and vomiting lasting longer than 4 hours or if unable to take medications  · Any signs of decreased circulation or nerve impairment to extremity: change in color, persistent  numbness, tingling, coldness or increase pain  · Any questions, call office @ [phone]      Keep scheduled follow up appointment. If need to change, call office @ [phone]. *  Please give a list of your current medications to your Primary Care Provider. *  Please update this list whenever your medications are discontinued, doses are      changed, or new medications (including over-the-counter products) are added. *  Please carry medication information at all times in case of emergency situations. Patient Education        Total Knee Replacement: What to Expect at Home  Your Recovery    When you leave the hospital, you should be able to move around with a walker or crutches. But you will need someone to help you at home for the next few weeks or until you have more energy and can move around better. If there is no one to help you at home, you may go to a rehabilitation center. You will go home with a bandage and stitches, staples, tissue glue, or tape strips. Change the bandage as your doctor tells you to. If you have stitches or staples, your doctor will remove them 10 to 21 days after your surgery. Glue or tape strips will fall off on their own over time. You may still have some mild pain, and the area may be swollen for 3 to 6 months after surgery. Your knee will continue to improve for 6 to 12 months. You will probably use a walker for 1 to 3 weeks and then use crutches. When you are ready, you can use a cane. You will probably be able to walk on your own in 4 to 8 weeks. You will need to do months of physical rehabilitation (rehab) after a knee replacement. Rehab will help you strengthen the muscles of the knee and help you regain movement. After you recover, your artificial knee will allow you to do normal daily activities with less pain or no pain at all. You may be able to hike, dance, ride a bike, and play golf. Talk to your doctor about whether you can do more strenuous activities. Always tell your caregivers that you have an artificial knee.   How long it will take to walk on your own, return to normal activities, and go back to work depends on your health and how well your rehabilitation (rehab) program goes. The better you do with your rehab exercises, the quicker you will get your strength and movement back. This care sheet gives you a general idea about how long it will take for you to recover. But each person recovers at a different pace. Follow the steps below to get better as quickly as possible. How can you care for yourself at home? Activity    · Rest when you feel tired. You may take a nap, but do not stay in bed all day. When you sit, use a chair with arms. You can use the arms to help you stand up.     · Work with your physical therapist to find the best way to exercise. You may be able to take frequent, short walks using crutches or a walker. What you can do as your knee heals will depend on whether your new knee is cemented or uncemented. You may not be able to do certain things for a while if your new knee is uncemented.     · After your knee has healed enough, you can do more strenuous activities with caution. ? You can golf, but use a golf cart, and do not wear shoes with spikes. ? You can bike on a flat road or on a stationary bike. Avoid biking up hills. ? Your doctor may suggest that you stay away from activities that put stress on your knee. These include tennis or badminton, squash or racquetball, contact sports like football, jumping (such as in basketball), jogging, or running. ? Avoid activities where you might fall. These include horseback riding, skiing, and mountain biking.     · Do not sit for more than 1 hour at a time. Get up and walk around for a while before you sit again. If you must sit for a long time, prop up your leg with a chair or footstool. This will help you avoid swelling.     · Ask your doctor when you can drive again.  It may take up to 8 weeks after knee replacement surgery before it is safe for you to drive.     · When you get into a car, sit on the edge of the seat. Then pull in your legs, and turn to face the front.     · You should be able to do many everyday activities 3 to 6 weeks after your surgery. You will probably need to take 4 to 16 weeks off from work. When you can go back to work depends on the type of work you do and how you feel.     · Ask your doctor when it is okay for you to have sex.     · Do not lift anything heavier than 10 pounds and do not lift weights for 12 weeks. Diet    · By the time you leave the hospital, you should be eating your normal diet. If your stomach is upset, try bland, low-fat foods like plain rice, broiled chicken, toast, and yogurt. Your doctor may suggest that you take iron and vitamin supplements.     · Drink plenty of fluids (unless your doctor tells you not to).   · Eat healthy foods, and watch your portion sizes. Try to stay at your ideal weight. Too much weight puts more stress on your new knee.     · You may notice that your bowel movements are not regular right after your surgery. This is common. Try to avoid constipation and straining with bowel movements. You may want to take a fiber supplement every day. If you have not had a bowel movement after a couple of days, ask your doctor about taking a mild laxative. Medicines    · Your doctor will tell you if and when you can restart your medicines. He or she will also give you instructions about taking any new medicines.     · If you take blood thinners, such as warfarin (Coumadin), clopidogrel (Plavix), or aspirin, be sure to talk to your doctor. He or she will tell you if and when to start taking those medicines again. Make sure that you understand exactly what your doctor wants you to do.     · Your doctor may give you a blood-thinning medicine to prevent blood clots. If you take a blood thinner, be sure you get instructions about how to take your medicine safely. Blood thinners can cause serious bleeding problems.  This medicine could be in pill form or as a shot (injection). If a shot is necessary, your doctor will tell you how to do this.     · Be safe with medicines. Take pain medicines exactly as directed. ? If the doctor gave you a prescription medicine for pain, take it as prescribed. ? If you are not taking a prescription pain medicine, ask your doctor if you can take an over-the-counter medicine. ? Plan to take your pain medicine 30 minutes before exercises. It is easier to prevent pain before it starts than to stop it once it has started.     · If you think your pain medicine is making you sick to your stomach:  ? Take your medicine after meals (unless your doctor has told you not to). ? Ask your doctor for a different pain medicine.     · If your doctor prescribed antibiotics, take them as directed. Do not stop taking them just because you feel better. You need to take the full course of antibiotics. Incision care    · If your doctor told you how to care for your cut (incision), follow your doctor's instructions. You will have a dressing over the cut. A dressing helps the incision heal and protects it. Your doctor will tell you how to take care of this.     · If you did not get instructions, follow this general advice:  ? If you have strips of tape on the cut the doctor made, leave the tape on for a week or until it falls off.  ? If you have stitches or staples, your doctor will tell you when to come back to have them removed. ? If you have skin adhesive on the cut, leave it on until it falls off. Skin adhesive is also called glue or liquid stitches. ? Change the bandage every day. ? Wash the area daily with warm water, and pat it dry. Don't use hydrogen peroxide or alcohol. They can slow healing. ? You may cover the area with a gauze bandage if it oozes fluid or rubs against clothing. ? You may shower 24 to 48 hours after surgery. Pat the incision dry.  Don't swim or take a bath for the first 2 weeks, or until your doctor tells you it is okay. Exercise    · Your rehab program will give you a number of exercises to do to help you get back your knee's range of motion and strength. Always do them as your therapist tells you. Ice and elevation    · For pain and swelling, put ice or a cold pack on the area for 10 to 20 minutes at a time. Put a thin cloth between the ice and your skin. Other instructions    · Continue to wear your support stockings as your doctor says. These help to prevent blood clots. The length of time that you will have to wear them depends on your activity level and the amount of swelling.     · You have metal pieces in your knee. These may set off some airport metal detectors. Carry a medical alert card that says you have an artificial joint, just in case. Follow-up care is a key part of your treatment and safety. Be sure to make and go to all appointments, and call your doctor if you are having problems. It's also a good idea to know your test results and keep a list of the medicines you take. When should you call for help? Call 911 anytime you think you may need emergency care. For example, call if:    · You passed out (lost consciousness).     · You have severe trouble breathing.     · You have sudden chest pain and shortness of breath, or you cough up blood.    Call your doctor now or seek immediate medical care if:    · You have signs of infection, such as:  ? Increased pain, swelling, warmth, or redness. ? Red streaks leading from the incision. ? Pus draining from the incision. ? A fever.     · You have signs of a blood clot, such as:  ? Pain in your calf, back of the knee, thigh, or groin. ?  Redness and swelling in your leg or groin.     · Your incision comes open and begins to bleed, or the bleeding increases.     · You have pain that does not get better after you take pain medicine.    Watch closely for changes in your health, and be sure to contact your doctor if:    · You do not have a bowel movement after taking a laxative. Where can you learn more? Go to http://arlen-sophia.info/. Enter S342 in the search box to learn more about \"Total Knee Replacement: What to Expect at Home. \"  Current as of: September 20, 2018  Content Version: 11.9  © 2402-3066 [employer]. Care instructions adapted under license by [employer] (which disclaims liability or warranty for this information). If you have questions about a medical condition or this instruction, always ask your healthcare professional. [address] any warranty or liability for your use of this information. These are general instructions for a healthy lifestyle:    No smoking/ No tobacco products/ Avoid exposure to second hand smoke    Surgeon General's Warning:  Quitting smoking now greatly reduces serious risk to your health. Obesity, smoking, and sedentary lifestyle greatly increases your risk for illness    A healthy diet, regular physical exercise & weight monitoring are important for maintaining a healthy lifestyle    You may be retaining fluid if you have a history of heart failure or if you experience any of the following symptoms:  Weight gain of 3 pounds or more overnight or 5 pounds in a week, increased swelling in our hands or feet or shortness of breath while lying flat in bed. Please call your doctor as soon as you notice any of these symptoms; do not wait until your next office visit. Recognize signs and symptoms of STROKE:    F-face looks uneven    A-arms unable to move or move even    S-speech slurred or non-existent    T-time-call 911 as soon as signs and symptoms begin-DO NOT go       Back to bed or wait to see if you get better-TIME IS BRAIN. The discharge information has been reviewed with the patient. The patient verbalized understanding.       Information obtained by :  I understand that if any problems occur once I am at home I am to contact my physician. I understand and acknowledge receipt of the instructions indicated above.                                                                                                                                            Physician's or R.N.'s Signature                                                                  Date/Time                                                                                                                                              Patient or Representative Signature                                                          Date/Time

## 2019-03-08 NOTE — PROGRESS NOTES
Sitting up in bed eating. No complaints. Aquacel dry and intact to R knee with iceman. NV checks WDL. Wife in room.

## 2019-03-08 NOTE — PROGRESS NOTES
03/07/19 2006   Oxygen Therapy   O2 Sat (%) 97 %   Pulse via Oximetry 80 beats per minute   O2 Device Room air   Pt. Refused CS monitor. Pt. Remains on RA and shows no signs of distress at this time.

## 2019-03-08 NOTE — PROGRESS NOTES
Has been to group therapy. Medicated again for pain with dilaudid po and tylenol. Given prescription for dilaudid and aspirin and instructed how to take. Has follow up appt already scheduled with Dr Shirin Lizarraga. Home therapy to see pt. Instructed to leave bandage in place and call doctor if having fever, excessive drainage, numbness or other problems. I have reviewed discharge instructions with the patient and spouse. The patient and spouse verbalized understanding. Taken to car via wheelchair. Discharged home with wife.

## 2019-03-08 NOTE — PROGRESS NOTES
Problem: Mobility Impaired (Adult and Pediatric)  Goal: *Acute Goals and Plan of Care (Insert Text)  GOALS (1-4 days):  (1.)Mr. Dru Gordon will move from supine to sit and sit to supine  in bed with SUPERVISION. (2.)Mr. Dru Gordon will transfer from bed to chair and chair to bed with SUPERVISION using the least restrictive device. (3.)Mr. Dru Gordon will ambulate with SUPERVISION for 250 feet with the least restrictive device. (4.)Mr. Dru Gordon will ambulate up/down 1 step without a railing with STAND BY ASSIST with walker. (5.)Mr. Dru Gordon will increase right knee ROM to 5°-80°.  ________________________________________________________________________________________________      PHYSICAL THERAPY [facility]: Daily Note, AM 3/8/2019  INPATIENT: Hospital Day: 3  [place] / [place] / Product Type: Medicare /      NAME/AGE/GENDER: Cristobal Davies is a 75 y.o. male   PRIMARY DIAGNOSIS:  Primary osteoarthritis of right knee [M17.11]   Procedure(s) and Anesthesia Type:     * KNEE ARTHROPLASTY TOTAL/ RIGHT/ CHRISTOPHER/ FNB - Spinal (Right)  ICD-10: Treatment Diagnosis:    · Pain in Right Knee (M25.561)  · Stiffness of Right Knee, Not elsewhere classified (M25.661)  · Difficulty in walking, Not elsewhere classified (R26.2)      ASSESSMENT:     Mr. Dru Gordon presents s/p R TKA. Patient demonstrates decreased R LE strength and ROM and decreased independence with mobility. Patient would benefit from therapy to address these deficits. Patient has a history of CMT, back pain, and R JEREMIAS. He plans on returning home at d/c with assist from his spouse. Patient supine on contact and agreeable to therapy this morning. Worked on gait training in the [place]ova, working on reciprocal gait pattern-pt doing well. In gym pt stated he did not need to practice stairs and that he only had one anyway and he knew how to do it. Worked on TKA exercises with verbal cues progressing with repetitions.  Pt walked back to his room with walker. He plans to go home today with HHPT for follow up. Reviewed HEP and frequency along with use of ice. Pt verbalizes understanding. This section established at most recent assessment   PROBLEM LIST (Impairments causing functional limitations):  1. Decreased Strength  2. Decreased ADL/Functional Activities  3. Decreased Transfer Abilities  4. Decreased Ambulation Ability/Technique  5. Decreased Balance  6. Increased Pain  7. Decreased Flexibility/Joint Mobility  8. Edema/Girth  9. Decreased [place] with Home Exercise Program   INTERVENTIONS PLANNED: (Benefits and precautions of physical therapy have been discussed with the patient.)  1. Bed Mobility  2. Gait Training  3. Home Exercise Program (HEP)  4. Therapeutic Exercise/Strengthening  5. Transfer Training  6. Range of Motion: active/assisted/passive  7. Therapeutic Activities  8. Group Therapy     TREATMENT PLAN: Frequency/Duration: Follow patient BID for duration of hospital stay to address above goals. Rehabilitation Potential For Stated Goals: Good     RECOMMENDED REHABILITATION/EQUIPMENT: (at time of discharge pending progress): Continue Skilled Therapy and Home Health: Physical Therapy. HISTORY:   History of Present Injury/Illness (Reason for Referral):  R TKA  Past Medical History/Comorbidities:   Mr. 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 Mr. Yessenia Diaz  has a past surgical history that includes hx tonsillectomy; hx other surgical (2002); hx hip replacement (Right, 10/2011); hx other surgical; and hx back surgery.   Social History/Living Environment:   Home Environment: Private residence  # Steps to Enter: 1  Rails to [facility]: No  One/Two Story Residence: One story  Living Alone: No  Support Systems: Spouse/Significant Other/Partner  Patient Expects to be Discharged to[de-identified] Private residence  Current DME Used/Available at Home: Cane, straight, Raised toilet seat, Shower chair, Walker, rolling  Tub or Shower Type: Shower  Prior Level of Function/Work/Activity:  Ambulating with a cane. Number of Personal Factors/Comorbidities that affect the Plan of Care: 1-2: MODERATE COMPLEXITY   EXAMINATION:   Most Recent Physical Functioning:               RLE AROM  R Knee Flexion: 87  R Knee Extension: 10                 Transfers  Sit to Stand: Stand-by assistance  Stand to Sit: Stand-by assistance    Balance  Sitting: Intact  Standing: Intact; With support         Gait Training: Yes    Weight Bearing Status  Right Side Weight Bearing: As tolerated  Distance (ft): 284 Feet (ft)(and another 284 feet)  Ambulation - Level of Assistance: Stand-by assistance  Assistive Device: Walker, rolling  Speed/Jennie: Pace decreased (<100 feet/min)  Step Length: Left shortened  Stance: Right decreased  Gait Abnormalities: Antalgic  Interventions: Safety awareness training;Verbal cues     Braces/Orthotics: none    Right Knee Cold  Type: Cryocuff      Body Structures Involved:  1. Joints  2. Muscles Body Functions Affected:  1. Movement Related Activities and Participation Affected:  1. Mobility  2. Self Care   Number of elements that affect the Plan of Care: 4+: HIGH COMPLEXITY   CLINICAL PRESENTATION:   Presentation: Stable and uncomplicated: LOW COMPLEXITY   CLINICAL DECISION MAKING:   M MIRAGE AM-PAC 6 Clicks   Basic Mobility Inpatient Short Form  How much difficulty does the patient currently have. .. Unable A Lot A Little None   1. Turning over in bed (including adjusting bedclothes, sheets and blankets)? [] 1   [] 2   [x] 3   [] 4   2.   Sitting down on and standing up from a chair with arms ( e.g., wheelchair, bedside commode, etc.)   [] 1   [] 2   [x] 3   [] 4   3. Moving from lying on back to sitting on the side of the bed? [] 1   [] 2   [x] 3   [] 4   How much help from another person does the patient currently need. .. Total A Lot A Little None   4. Moving to and from a bed to a chair (including a wheelchair)? [] 1   [] 2   [x] 3   [] 4   5. Need to walk in hospital room? [] 1   [] 2   [x] 3   [] 4   6. Climbing 3-5 steps with a railing? [] 1   [] 2   [x] 3   [] 4   © 2007, Trustees of [address] [place] [address] [address] [address], under license to "[employer]. All rights reserved        Score:  Initial: 18 Most Recent: X (Date: -- )    Interpretation of Tool:  Represents activities that are increasingly more difficult (i.e. Bed mobility, Transfers, Gait). Medical Necessity:     · Patient is expected to demonstrate progress in strength, range of motion, balance and coordination to increase independence with mobility, ADLs, and HEP. Reason for Services/Other Comments:  · Patient continues to require skilled intervention due to decreased R LE strength and ROM and decreased independence with mobility. Use of outcome tool(s) and clinical judgement create a POC that gives a: Clear prediction of patient's progress: LOW COMPLEXITY            TREATMENT:   (In addition to Assessment/Re-Assessment sessions the following treatments were rendered)     Pre-treatment Symptoms/Complaints:  Patient ready for group session. Pain Initial: having some pain during therapy, no reports of pain before     Post Session:  3/10     Gait Training (15 Minutes):  Gait training to improve and/or restore physical functioning as related to mobility, balance and coordination. Ambulated 284 Feet (ft)(and another 284 feet) with Stand-by assistance using a Walker, rolling and minimal Safety awareness training;Verbal cues related to their stance phase and stride length to promote proper body alignment.     Therapeutic Exercise: (45 Minutes(group)): Exercises per grid below to improve mobility and strength. Required minimal verbal and tactile cues to promote proper body alignment. Progressed range and repetitions as indicated. Date:  3/7/19 Date:  3/8/19 Date:     ACTIVITY/EXERCISE AM PM AM PM AM PM   GROUP THERAPY  []  [x]  [x]  []  []  []   Ankle Pumps 15 15 20      Quad Sets 15 15 20      Gluteal Sets 15 15 20      Hip ABd/ADduction 15 15 20      Straight Leg Raises 15 15 20      Knee Slides 15 15 20      Short Arc Quads 15 15 20      Long Arc Quads         Chair Slides  15 20               B = bilateral; AA = active assistive; A = active; P = passive      Treatment/Session Assessment:     Response to Treatment:  Patient participated well, progressing nicely. Education:  [x] Home Exercises  [x] Fall Precautions  [] Hip Precautions [] D/C Instruction Review  [x] Knee/Hip Prosthesis Review  [x] Walker Management/Safety [] Adaptive Equipment as Needed       Interdisciplinary Collaboration:   o Registered Nurse  o Rehabilitation Attendant    After treatment position/precautions:   o Up in chair  o Bed/Chair-wheels locked  o Caregiver at bedside  o Call light within reach    Compliance with Program/Exercises: Compliant all of the time. Recommendations/Intent for next treatment session:  Treatment next visit will focus on increasing Mr. Yuan's independence with bed mobility, transfers, gait training, strength/ROM exercises, modalities for pain, and patient education.       Total Treatment Duration:  PT Patient Time In/Time Out  Time In: 0930  Time Out: [address] Portillo PT

## 2019-03-08 NOTE — PROGRESS NOTES
2019         Post Op day: 2 Days Post-OpProcedure(s) (LRB):  KNEE ARTHROPLASTY TOTAL/ RIGHT/ CHRISTOPHER/ FNB (Right)      Admit Date: 3/6/2019  Admit Diagnosis: Primary osteoarthritis of right knee [M17.11]  Osteoarthritis of right knee [M17.11]  Osteoarthritis [M19.90]    LAB:    Recent Results (from the past 24 hour(s))   PLEASE READ & DOCUMENT PPD TEST IN 24 HRS    Collection Time: 19  8:16 AM   Result Value Ref Range    PPD  Negative    mm Induration 0 0 - 5 mm   HEMOGLOBIN    Collection Time: 19  4:07 AM   Result Value Ref Range    HGB 9.8 (L) 13.6 - 17.2 g/dL     Vital Signs:    Patient Vitals for the past 8 hrs:   BP Temp Pulse Resp SpO2   19 0402 122/73 98.1 °F (36.7 °C) 76 16 98 %   19 0010 148/77 98.3 °F (36.8 °C) 73 16 96 %     Temp (24hrs), Av.8 °F (36.6 °C), Min:97.6 °F (36.4 °C), Max:98.3 °F (36.8 °C)    Body mass index is 30 kg/m².   Pain Control:   Pain Assessment  Pain Scale 1: Numeric (0 - 10)  Pain Intensity 1: 4  Pain Onset 1: years   Pain Location 1: Knee  Pain Orientation 1: Right  Pain Description 1: Intermittent  Pain Intervention(s) 1: Medication (see MAR)    Subjective: Doing well, No complaints, No SOB, No Chest Pain, No Nausea or Vomitting     Objective: Vital Signs are Stable, No Acute Distress, Alert and Oriented, Dressing is Dry,  Neurovascular exam is normal.       PT/OT:            Assistive Device: Walker (comment)  RLE AROM  R Knee Flexion: 97  R Knee Extension: 7             Weight Bearing Status: WBAT    Meds:  [unfilled]  [unfilled]  [unfilled]    Assessment:   Patient Active Problem List   Diagnosis Code    Osteoarthritis of hip M16.9    S/P prosthetic total arthroplasty of the hip Z96.649    HTN (hypertension) I10    BPH (benign prostatic hypertrophy) N40.0    Osteoarthritis of right knee M17.11    Osteoarthritis M19.90    Status post total knee replacement, right Z96.651             Plan: Continue Physical Therapy, Monitor  Hbg, home today.        Signed By: Darlene Eng MD

## 2019-03-09 ENCOUNTER — HOME CARE VISIT (OUTPATIENT)
Dept: SCHEDULING | Facility: HOME HEALTH | Age: 74
End: 2019-03-09
Payer: MEDICARE

## 2019-03-09 VITALS
RESPIRATION RATE: 20 BRPM | HEART RATE: 92 BPM | DIASTOLIC BLOOD PRESSURE: 74 MMHG | TEMPERATURE: 98.9 F | SYSTOLIC BLOOD PRESSURE: 162 MMHG

## 2019-03-09 PROCEDURE — 3331090002 HH PPS REVENUE DEBIT

## 2019-03-09 PROCEDURE — G0151 HHCP-SERV OF PT,EA 15 MIN: HCPCS

## 2019-03-09 PROCEDURE — 3331090001 HH PPS REVENUE CREDIT

## 2019-03-09 PROCEDURE — 400013 HH SOC

## 2019-03-10 PROCEDURE — 3331090002 HH PPS REVENUE DEBIT

## 2019-03-10 PROCEDURE — 3331090001 HH PPS REVENUE CREDIT

## 2019-03-11 PROCEDURE — 3331090001 HH PPS REVENUE CREDIT

## 2019-03-11 PROCEDURE — 3331090002 HH PPS REVENUE DEBIT

## 2019-03-12 ENCOUNTER — HOME CARE VISIT (OUTPATIENT)
Dept: SCHEDULING | Facility: HOME HEALTH | Age: 74
End: 2019-03-12
Payer: MEDICARE

## 2019-03-12 PROCEDURE — G0157 HHC PT ASSISTANT EA 15: HCPCS

## 2019-03-12 PROCEDURE — 3331090002 HH PPS REVENUE DEBIT

## 2019-03-12 PROCEDURE — 3331090001 HH PPS REVENUE CREDIT

## 2019-03-13 ENCOUNTER — HOME CARE VISIT (OUTPATIENT)
Dept: SCHEDULING | Facility: HOME HEALTH | Age: 74
End: 2019-03-13
Payer: MEDICARE

## 2019-03-13 VITALS
HEART RATE: 84 BPM | DIASTOLIC BLOOD PRESSURE: 78 MMHG | SYSTOLIC BLOOD PRESSURE: 160 MMHG | TEMPERATURE: 98.5 F | RESPIRATION RATE: 18 BRPM

## 2019-03-13 PROCEDURE — G0157 HHC PT ASSISTANT EA 15: HCPCS

## 2019-03-13 PROCEDURE — 3331090002 HH PPS REVENUE DEBIT

## 2019-03-13 PROCEDURE — 3331090001 HH PPS REVENUE CREDIT

## 2019-03-14 VITALS
HEART RATE: 88 BPM | DIASTOLIC BLOOD PRESSURE: 78 MMHG | RESPIRATION RATE: 18 BRPM | SYSTOLIC BLOOD PRESSURE: 170 MMHG | TEMPERATURE: 98.6 F

## 2019-03-14 PROCEDURE — 3331090001 HH PPS REVENUE CREDIT

## 2019-03-14 PROCEDURE — 3331090002 HH PPS REVENUE DEBIT

## 2019-03-15 ENCOUNTER — HOME CARE VISIT (OUTPATIENT)
Dept: SCHEDULING | Facility: HOME HEALTH | Age: 74
End: 2019-03-15
Payer: MEDICARE

## 2019-03-15 PROCEDURE — 3331090002 HH PPS REVENUE DEBIT

## 2019-03-15 PROCEDURE — G0157 HHC PT ASSISTANT EA 15: HCPCS

## 2019-03-15 PROCEDURE — 3331090001 HH PPS REVENUE CREDIT

## 2019-03-16 PROCEDURE — 3331090002 HH PPS REVENUE DEBIT

## 2019-03-16 PROCEDURE — 3331090001 HH PPS REVENUE CREDIT

## 2019-03-17 VITALS
DIASTOLIC BLOOD PRESSURE: 72 MMHG | RESPIRATION RATE: 18 BRPM | SYSTOLIC BLOOD PRESSURE: 158 MMHG | HEART RATE: 88 BPM | TEMPERATURE: 98.4 F

## 2019-03-17 PROCEDURE — 3331090002 HH PPS REVENUE DEBIT

## 2019-03-17 PROCEDURE — 3331090001 HH PPS REVENUE CREDIT

## 2019-03-18 ENCOUNTER — HOME CARE VISIT (OUTPATIENT)
Dept: SCHEDULING | Facility: HOME HEALTH | Age: 74
End: 2019-03-18
Payer: MEDICARE

## 2019-03-18 VITALS
TEMPERATURE: 98.6 F | SYSTOLIC BLOOD PRESSURE: 158 MMHG | RESPIRATION RATE: 18 BRPM | DIASTOLIC BLOOD PRESSURE: 80 MMHG | HEART RATE: 86 BPM

## 2019-03-18 PROCEDURE — 3331090001 HH PPS REVENUE CREDIT

## 2019-03-18 PROCEDURE — 3331090002 HH PPS REVENUE DEBIT

## 2019-03-18 PROCEDURE — G0157 HHC PT ASSISTANT EA 15: HCPCS

## 2019-03-19 PROCEDURE — 3331090002 HH PPS REVENUE DEBIT

## 2019-03-19 PROCEDURE — 3331090001 HH PPS REVENUE CREDIT

## 2019-03-20 PROCEDURE — 3331090001 HH PPS REVENUE CREDIT

## 2019-03-20 PROCEDURE — 3331090002 HH PPS REVENUE DEBIT

## 2019-03-21 ENCOUNTER — HOME CARE VISIT (OUTPATIENT)
Dept: SCHEDULING | Facility: HOME HEALTH | Age: 74
End: 2019-03-21
Payer: MEDICARE

## 2019-03-21 VITALS
TEMPERATURE: 98.3 F | RESPIRATION RATE: 18 BRPM | DIASTOLIC BLOOD PRESSURE: 78 MMHG | SYSTOLIC BLOOD PRESSURE: 170 MMHG | HEART RATE: 82 BPM

## 2019-03-21 PROCEDURE — 3331090001 HH PPS REVENUE CREDIT

## 2019-03-21 PROCEDURE — 3331090002 HH PPS REVENUE DEBIT

## 2019-03-21 PROCEDURE — G0157 HHC PT ASSISTANT EA 15: HCPCS

## 2019-03-22 PROCEDURE — 3331090002 HH PPS REVENUE DEBIT

## 2019-03-22 PROCEDURE — 3331090001 HH PPS REVENUE CREDIT

## 2019-03-23 PROCEDURE — 3331090002 HH PPS REVENUE DEBIT

## 2019-03-23 PROCEDURE — 3331090001 HH PPS REVENUE CREDIT

## 2019-03-24 PROCEDURE — A4649 SURGICAL SUPPLIES: HCPCS

## 2019-03-24 PROCEDURE — 3331090002 HH PPS REVENUE DEBIT

## 2019-03-24 PROCEDURE — 3331090001 HH PPS REVENUE CREDIT

## 2019-03-25 ENCOUNTER — HOME CARE VISIT (OUTPATIENT)
Dept: SCHEDULING | Facility: HOME HEALTH | Age: 74
End: 2019-03-25
Payer: MEDICARE

## 2019-03-25 PROCEDURE — 3331090001 HH PPS REVENUE CREDIT

## 2019-03-25 PROCEDURE — G0157 HHC PT ASSISTANT EA 15: HCPCS

## 2019-03-25 PROCEDURE — 3331090002 HH PPS REVENUE DEBIT

## 2019-03-26 VITALS
TEMPERATURE: 98.5 F | HEART RATE: 86 BPM | SYSTOLIC BLOOD PRESSURE: 170 MMHG | RESPIRATION RATE: 18 BRPM | DIASTOLIC BLOOD PRESSURE: 80 MMHG

## 2019-03-26 PROCEDURE — 3331090002 HH PPS REVENUE DEBIT

## 2019-03-26 PROCEDURE — 3331090001 HH PPS REVENUE CREDIT

## 2019-03-27 PROCEDURE — 3331090001 HH PPS REVENUE CREDIT

## 2019-03-27 PROCEDURE — 3331090002 HH PPS REVENUE DEBIT

## 2019-03-28 ENCOUNTER — HOME CARE VISIT (OUTPATIENT)
Dept: SCHEDULING | Facility: HOME HEALTH | Age: 74
End: 2019-03-28
Payer: MEDICARE

## 2019-03-28 VITALS
TEMPERATURE: 98.3 F | HEART RATE: 82 BPM | DIASTOLIC BLOOD PRESSURE: 80 MMHG | SYSTOLIC BLOOD PRESSURE: 170 MMHG | RESPIRATION RATE: 18 BRPM

## 2019-03-28 PROCEDURE — G0157 HHC PT ASSISTANT EA 15: HCPCS

## 2019-03-28 PROCEDURE — 3331090001 HH PPS REVENUE CREDIT

## 2019-03-28 PROCEDURE — 3331090002 HH PPS REVENUE DEBIT

## 2019-03-29 PROCEDURE — 3331090002 HH PPS REVENUE DEBIT

## 2019-03-29 PROCEDURE — 3331090001 HH PPS REVENUE CREDIT

## 2019-03-30 PROCEDURE — 3331090002 HH PPS REVENUE DEBIT

## 2019-03-30 PROCEDURE — 3331090001 HH PPS REVENUE CREDIT

## 2019-03-31 PROCEDURE — 3331090001 HH PPS REVENUE CREDIT

## 2019-03-31 PROCEDURE — 3331090002 HH PPS REVENUE DEBIT

## 2019-04-01 ENCOUNTER — HOME CARE VISIT (OUTPATIENT)
Dept: SCHEDULING | Facility: HOME HEALTH | Age: 74
End: 2019-04-01
Payer: MEDICARE

## 2019-04-01 VITALS
RESPIRATION RATE: 22 BRPM | TEMPERATURE: 98.1 F | DIASTOLIC BLOOD PRESSURE: 86 MMHG | HEART RATE: 84 BPM | SYSTOLIC BLOOD PRESSURE: 148 MMHG

## 2019-04-01 PROCEDURE — 3331090001 HH PPS REVENUE CREDIT

## 2019-04-01 PROCEDURE — G0151 HHCP-SERV OF PT,EA 15 MIN: HCPCS

## 2019-04-01 PROCEDURE — 3331090002 HH PPS REVENUE DEBIT

## 2019-05-24 ENCOUNTER — HOSPITAL ENCOUNTER (OUTPATIENT)
Dept: SURGERY | Age: 74
Discharge: HOME | End: 2019-05-24
Attending: ORTHOPAEDIC SURGERY
Payer: MEDICARE

## 2019-05-24 VITALS
BODY MASS INDEX: 30.3 KG/M2 | OXYGEN SATURATION: 97 % | TEMPERATURE: 98.3 F | HEART RATE: 71 BPM | RESPIRATION RATE: 18 BRPM | DIASTOLIC BLOOD PRESSURE: 98 MMHG | SYSTOLIC BLOOD PRESSURE: 164 MMHG | HEIGHT: 75 IN | WEIGHT: 243.7 LBS

## 2019-05-24 LAB
ANION GAP SERPL CALC-SCNC: 7 MMOL/L (ref 7–16)
APPEARANCE UR: CLEAR
APTT PPP: 26.9 SEC (ref 24.7–39.8)
BACTERIA SPEC CULT: NORMAL
BASOPHILS # BLD: 0 K/UL (ref 0–0.2)
BASOPHILS NFR BLD: 1 % (ref 0–2)
BILIRUB UR QL: NEGATIVE
BUN SERPL-MCNC: 17 MG/DL (ref 8–23)
CALCIUM SERPL-MCNC: 8.8 MG/DL (ref 8.3–10.4)
CHLORIDE SERPL-SCNC: 106 MMOL/L (ref 98–107)
CO2 SERPL-SCNC: 28 MMOL/L (ref 21–32)
COLOR UR: YELLOW
CREAT SERPL-MCNC: 1.12 MG/DL (ref 0.8–1.5)
DIFFERENTIAL METHOD BLD: ABNORMAL
EOSINOPHIL # BLD: 0.1 K/UL (ref 0–0.8)
EOSINOPHIL NFR BLD: 3 % (ref 0.5–7.8)
ERYTHROCYTE [DISTWIDTH] IN BLOOD BY AUTOMATED COUNT: 14.6 % (ref 11.9–14.6)
GLUCOSE SERPL-MCNC: 113 MG/DL (ref 65–100)
GLUCOSE UR STRIP.AUTO-MCNC: NEGATIVE MG/DL
HCT VFR BLD AUTO: 41.3 % (ref 41.1–50.3)
HGB BLD-MCNC: 13.4 G/DL (ref 13.6–17.2)
HGB UR QL STRIP: NEGATIVE
IMM GRANULOCYTES # BLD AUTO: 0 K/UL (ref 0–0.5)
IMM GRANULOCYTES NFR BLD AUTO: 1 % (ref 0–5)
INR PPP: 1
KETONES UR QL STRIP.AUTO: NEGATIVE MG/DL
LEUKOCYTE ESTERASE UR QL STRIP.AUTO: NEGATIVE
LYMPHOCYTES # BLD: 0.7 K/UL (ref 0.5–4.6)
LYMPHOCYTES NFR BLD: 17 % (ref 13–44)
MCH RBC QN AUTO: 28.3 PG (ref 26.1–32.9)
MCHC RBC AUTO-ENTMCNC: 32.4 G/DL (ref 31.4–35)
MCV RBC AUTO: 87.3 FL (ref 79.6–97.8)
MONOCYTES # BLD: 0.4 K/UL (ref 0.1–1.3)
MONOCYTES NFR BLD: 8 % (ref 4–12)
NEUTS SEG # BLD: 3.1 K/UL (ref 1.7–8.2)
NEUTS SEG NFR BLD: 71 % (ref 43–78)
NITRITE UR QL STRIP.AUTO: NEGATIVE
NRBC # BLD: 0 K/UL (ref 0–0.2)
PH UR STRIP: 5 [PH] (ref 5–9)
PLATELET # BLD AUTO: 124 K/UL (ref 150–450)
PMV BLD AUTO: 10.3 FL (ref 9.4–12.3)
POTASSIUM SERPL-SCNC: 4.3 MMOL/L (ref 3.5–5.1)
PROT UR STRIP-MCNC: NEGATIVE MG/DL
PROTHROMBIN TIME: 13.5 SEC (ref 11.7–14.5)
RBC # BLD AUTO: 4.73 M/UL (ref 4.23–5.6)
SERVICE CMNT-IMP: NORMAL
SODIUM SERPL-SCNC: 141 MMOL/L (ref 136–145)
SP GR UR REFRACTOMETRY: 1.02 (ref 1–1.02)
UROBILINOGEN UR QL STRIP.AUTO: 0.2 EU/DL (ref 0.2–1)
WBC # BLD AUTO: 4.4 K/UL (ref 4.3–11.1)

## 2019-05-24 PROCEDURE — 80048 BASIC METABOLIC PNL TOTAL CA: CPT

## 2019-05-24 PROCEDURE — 85025 COMPLETE CBC W/AUTO DIFF WBC: CPT

## 2019-05-24 PROCEDURE — 87641 MR-STAPH DNA AMP PROBE: CPT

## 2019-05-24 PROCEDURE — 85610 PROTHROMBIN TIME: CPT

## 2019-05-24 PROCEDURE — 77030027138 HC INCENT SPIROMETER -A

## 2019-05-24 PROCEDURE — 81003 URINALYSIS AUTO W/O SCOPE: CPT

## 2019-05-24 PROCEDURE — 85730 THROMBOPLASTIN TIME PARTIAL: CPT

## 2019-05-24 RX ORDER — DICLOFENAC SODIUM 75 MG/1
75 TABLET, DELAYED RELEASE ORAL DAILY
COMMUNITY
End: 2019-06-03

## 2019-05-24 RX ORDER — HYDROCODONE BITARTRATE AND ACETAMINOPHEN 5; 325 MG/1; MG/1
TABLET ORAL
COMMUNITY
End: 2019-06-03

## 2019-05-24 RX ORDER — AMOXICILLIN 875 MG/1
875 TABLET, FILM COATED ORAL 2 TIMES DAILY
COMMUNITY
End: 2019-06-12

## 2019-05-24 RX ORDER — DIPHENHYDRAMINE HCL 25 MG
25 CAPSULE ORAL
COMMUNITY
End: 2020-01-27

## 2019-05-24 RX ORDER — FINASTERIDE 5 MG/1
5 TABLET, FILM COATED ORAL
COMMUNITY
End: 2021-01-27 | Stop reason: SDUPTHER

## 2019-05-24 NOTE — PERIOP NOTES
Patient verified name and . Order for consent  found in EHR and matches case posting; patient verified. Elevated B/P today: reported to [facility] HAMLET D., NP-no orders received. Pt reports he is going to follow up with his PCP, going to call him and report readings today of 163/98 and 164/98. Pt reports tooth extraction yesterday 19: left upper back tooth. He reports taking antibiotic x 7 days and pain med x 3 days: started yesterday, will be complete prior to DOS. Pt has appointment with surgeon prior to DOS. I called and notified Briseida Walker at surgeon's office. Type 3 surgery, Joint assessment complete. Labs per surgeon: cbc,bmp,pt,ptt,ua,mssa ; results pending. Labs per anesthesia protocol: no other needed. EKG:dated 19 printed from EMR-within anesthesia limits. MRSA/MSSA swab collected; pharmacy to review and dose antibiotic as appropriate. Hibiclens and instructions to return bottle on DOS given per hospital policy. Patient provided with handouts including Guide to Surgery, Pain Management, Hand Hygiene, Blood Transfusion Education, and [facility] Brochure. Patient answered medical/surgical history questions at their best of ability. All prior to admission medications documented in [facility]. Original medication prescription bottle  visualized during patient appointment. Patient instructed to hold all vitamins 7 days prior to surgery and NSAIDS 5 days prior to surgery. Prescription medications to hold: diclofenac- 5 days. Patient instructed to continue previous medications as prescribed prior to surgery and to take the following medications the day of surgery according to anesthesia guidelines with a small sip of water: flomax. Patient teach back successful and patient demonstrates knowledge of instruction.

## 2019-05-24 NOTE — PERIOP NOTES
Lab results are within limits per anesthesia protocol; [facility] [place].

## 2019-05-24 NOTE — PERIOP NOTES
Recent Results (from the past 12 hour(s))   CBC WITH AUTOMATED DIFF    Collection Time: 05/24/19 10:31 AM   Result Value Ref Range    WBC 4.4 4.3 - 11.1 K/uL    RBC 4.73 4.23 - 5.6 M/uL    HGB 13.4 (L) 13.6 - 17.2 g/dL    HCT 41.3 41.1 - 50.3 %    MCV 87.3 79.6 - 97.8 FL    MCH 28.3 26.1 - 32.9 PG    MCHC 32.4 31.4 - 35.0 g/dL    RDW 14.6 11.9 - 14.6 %    PLATELET 501 (L) 377 - 450 K/uL    MPV 10.3 9.4 - 12.3 FL    ABSOLUTE NRBC 0.00 0.0 - 0.2 K/uL    DF AUTOMATED      NEUTROPHILS 71 43 - 78 %    LYMPHOCYTES 17 13 - 44 %    MONOCYTES 8 4.0 - 12.0 %    EOSINOPHILS 3 0.5 - 7.8 %    BASOPHILS 1 0.0 - 2.0 %    IMMATURE GRANULOCYTES 1 0.0 - 5.0 %    ABS. NEUTROPHILS 3.1 1.7 - 8.2 K/UL    ABS. LYMPHOCYTES 0.7 0.5 - 4.6 K/UL    ABS. MONOCYTES 0.4 0.1 - 1.3 K/UL    ABS. EOSINOPHILS 0.1 0.0 - 0.8 K/UL    ABS. BASOPHILS 0.0 0.0 - 0.2 K/UL    ABS. IMM.  GRANS. 0.0 0.0 - 0.5 K/UL   METABOLIC PANEL, BASIC    Collection Time: 05/24/19 10:31 AM   Result Value Ref Range    Sodium 141 136 - 145 mmol/L    Potassium 4.3 3.5 - 5.1 mmol/L    Chloride 106 98 - 107 mmol/L    CO2 28 21 - 32 mmol/L    Anion gap 7 7 - 16 mmol/L    Glucose 113 (H) 65 - 100 mg/dL    BUN 17 8 - 23 MG/DL    Creatinine 1.12 0.8 - 1.5 MG/DL    GFR est AA >60 >60 ml/min/1.73m2    GFR est non-AA >60 >60 ml/min/1.73m2    Calcium 8.8 8.3 - 10.4 MG/DL   PROTHROMBIN TIME + INR    Collection Time: 05/24/19 10:31 AM   Result Value Ref Range    Prothrombin time 13.5 11.7 - 14.5 sec    INR 1.0     PTT    Collection Time: 05/24/19 10:31 AM   Result Value Ref Range    aPTT 26.9 24.7 - 39.8 SEC   URINALYSIS W/ RFLX MICROSCOPIC    Collection Time: 05/24/19 10:31 AM   Result Value Ref Range    Color YELLOW      Appearance CLEAR      Specific gravity 1.025 (H) 1.001 - 1.023      pH (UA) 5.0 5.0 - 9.0      Protein NEGATIVE  NEG mg/dL    Glucose NEGATIVE  mg/dL    Ketone NEGATIVE  NEG mg/dL    Bilirubin NEGATIVE  NEG      Blood NEGATIVE  NEG      Urobilinogen 0.2 0.2 - 1.0 EU/dL Nitrites NEGATIVE  NEG      Leukocyte Esterase NEGATIVE  NEG

## 2019-05-24 NOTE — PERIOP NOTES
Recent Results (from the past 12 hour(s))   CBC WITH AUTOMATED DIFF    Collection Time: 05/24/19 10:31 AM   Result Value Ref Range    WBC 4.4 4.3 - 11.1 K/uL    RBC 4.73 4.23 - 5.6 M/uL    HGB 13.4 (L) 13.6 - 17.2 g/dL    HCT 41.3 41.1 - 50.3 %    MCV 87.3 79.6 - 97.8 FL    MCH 28.3 26.1 - 32.9 PG    MCHC 32.4 31.4 - 35.0 g/dL    RDW 14.6 11.9 - 14.6 %    PLATELET 828 (L) 632 - 450 K/uL    MPV 10.3 9.4 - 12.3 FL    ABSOLUTE NRBC 0.00 0.0 - 0.2 K/uL    DF AUTOMATED      NEUTROPHILS 71 43 - 78 %    LYMPHOCYTES 17 13 - 44 %    MONOCYTES 8 4.0 - 12.0 %    EOSINOPHILS 3 0.5 - 7.8 %    BASOPHILS 1 0.0 - 2.0 %    IMMATURE GRANULOCYTES 1 0.0 - 5.0 %    ABS. NEUTROPHILS 3.1 1.7 - 8.2 K/UL    ABS. LYMPHOCYTES 0.7 0.5 - 4.6 K/UL    ABS. MONOCYTES 0.4 0.1 - 1.3 K/UL    ABS. EOSINOPHILS 0.1 0.0 - 0.8 K/UL    ABS. BASOPHILS 0.0 0.0 - 0.2 K/UL    ABS. IMM.  GRANS. 0.0 0.0 - 0.5 K/UL   METABOLIC PANEL, BASIC    Collection Time: 05/24/19 10:31 AM   Result Value Ref Range    Sodium 141 136 - 145 mmol/L    Potassium 4.3 3.5 - 5.1 mmol/L    Chloride 106 98 - 107 mmol/L    CO2 28 21 - 32 mmol/L    Anion gap 7 7 - 16 mmol/L    Glucose 113 (H) 65 - 100 mg/dL    BUN 17 8 - 23 MG/DL    Creatinine 1.12 0.8 - 1.5 MG/DL    GFR est AA >60 >60 ml/min/1.73m2    GFR est non-AA >60 >60 ml/min/1.73m2    Calcium 8.8 8.3 - 10.4 MG/DL   PROTHROMBIN TIME + INR    Collection Time: 05/24/19 10:31 AM   Result Value Ref Range    Prothrombin time 13.5 11.7 - 14.5 sec    INR 1.0     PTT    Collection Time: 05/24/19 10:31 AM   Result Value Ref Range    aPTT 26.9 24.7 - 39.8 SEC   URINALYSIS W/ RFLX MICROSCOPIC    Collection Time: 05/24/19 10:31 AM   Result Value Ref Range    Color YELLOW      Appearance CLEAR      Specific gravity 1.025 (H) 1.001 - 1.023      pH (UA) 5.0 5.0 - 9.0      Protein NEGATIVE  NEG mg/dL    Glucose NEGATIVE  mg/dL    Ketone NEGATIVE  NEG mg/dL    Bilirubin NEGATIVE  NEG      Blood NEGATIVE  NEG      Urobilinogen 0.2 0.2 - 1.0 EU/dL Nitrites NEGATIVE  NEG      Leukocyte Esterase NEGATIVE  NEG

## 2019-05-28 NOTE — H&P
[id number] [facility]  Pre Operative History and Physical Exam    Patient ID:  Rober Denney  [id number]  89 y.o.  1945    Today: May 28, 2019       Assessment:   1. Arthritis of the left knee        Plan:    1. Proceed with scheduled Procedure(s) (LRB):  LEFT TOTAL KNEE ARTHROPLASTY SPINAL/FNB CHRISTOPHER (Left)            CC: Left knee pain    HPI:   The patient has end stage arthritis of the left knee. The patient was evaluated and examined during a consultation prior to this office visit. There have been no changes to the patient's orthopedic condition since the initial consultation. The patient has failed previous conservative treatment for this condition including antiinflammatories , and lifestyle modifications. The necessity for joint replacement is present.  The patient will be admitted the day of surgery for Procedure(s) (LRB):  LEFT TOTAL KNEE ARTHROPLASTY SPINAL/FNB CHRISTOPHER (Left)      Past Medical/Surgical History:  Past Medical History:   Diagnosis Date    Arthritis     Avascular necrosis ([address]     right hip    BPH (benign prostatic hyperplasia)     medication    CMT (Charcot-Leslie-Tooth disease)     Followed by Dr. Yossi Ramirez- neurology     H/O tooth extraction 05/23/2019    upper left; antibiotics x 7 days and pain med x 3 days    Hypertension     controlled with medication    Impotence of organic origin     Nocturia     Osteoarthritis of hip 10/19/2011    S/P prosthetic total arthroplasty of the hip 10/19/2011    Skin cancer     multiple bcc and scc removed     Snores     Status post total knee replacement, right 3/6/2019     Past Surgical History:   Procedure Laterality Date    HX BACK SURGERY      Lumbar laminectomy- no hardware     HX HIP REPLACEMENT Right 10/2011    total    HX KNEE REPLACEMENT Right 03/06/2019    HX OTHER SURGICAL  2002    skin cancer removed from lip, plastic surgery afterward    HX OTHER SURGICAL      skin cancer from the ear,left leg and behind the area    HX TONSILLECTOMY          Allergies: Allergies   Allergen Reactions    Adhesive Rash        Physical Exam:   General: NAD, Alert, Oriented, Appears their stated age     [de-identified]: NC/AT, PERRL    Skin: No rashes, lesions or wounds seen      Psych: normal affect      Heart: Regular Rate, Rhythm     Lungs: unlabored respirations, normal breath sounds     Abdomen: Soft and non-distended     Ortho: Pain with limited ROM of the left knee    Neuro: no focal defects, sensation is equal bilaterally     Lymph: no lymphadenopathy     Meds:   No current facility-administered medications for this encounter. Current Outpatient Medications   Medication Sig    diclofenac EC (VOLTAREN) 75 mg EC tablet Take 75 mg by mouth daily.  finasteride (PROSCAR) 5 mg tablet Take 5 mg by mouth nightly. Indications: enlarged prostate with urination problem    HYDROcodone-acetaminophen (NORCO) 5-325 mg per tablet Take  by mouth every four (4) hours as needed for Pain. Indications: Pain, for 3 days s/p tooth removal 5-23-19    amoxicillin (AMOXIL) 875 mg tablet Take 875 mg by mouth two (2) times a day. Indications: take 7 days s/p tooth removal on 5-23-19    diphenhydrAMINE (BENADRYL) 25 mg capsule Take 25 mg by mouth nightly. Indications: inflammation of the nose due to an allergy    acetaminophen (TYLENOL) 500 mg tablet Take 500-1,000 mg by mouth every six (6) hours as needed for Pain.  cholecalciferol, VITAMIN D3, (VITAMIN D3) 5,000 unit tab tablet Take 5,000 Units by mouth every seven (7) days. on Mondays    docusate sodium (STOOL SOFTENER) 100 mg capsule Take 100 mg by mouth daily.  tamsulosin (FLOMAX) 0.4 mg capsule Take 0.4 mg by mouth daily.  tadalafil (CIALIS) 5 mg tablet Take 1 Tab by mouth daily. (Patient taking differently: Take 5 mg by mouth nightly.)    terazosin (HYTRIN) 10 mg capsule Take 10 mg by mouth nightly.  hydrochlorothiazide (HYDRODIURIL) 25 mg tablet Take 25 mg by mouth daily. Labs:  Hospital Outpatient Visit on 05/24/2019   Component Date Value Ref Range Status    WBC 05/24/2019 4.4  4.3 - 11.1 K/uL Final    RBC 05/24/2019 4.73  4.23 - 5.6 M/uL Final    HGB 05/24/2019 13.4* 13.6 - 17.2 g/dL Final    HCT 05/24/2019 41.3  41.1 - 50.3 % Final    MCV 05/24/2019 87.3  79.6 - 97.8 FL Final    MCH 05/24/2019 28.3  26.1 - 32.9 PG Final    MCHC 05/24/2019 32.4  31.4 - 35.0 g/dL Final    RDW 05/24/2019 14.6  11.9 - 14.6 % Final    PLATELET 50/48/1267 634* 150 - 450 K/uL Final    MPV 05/24/2019 10.3  9.4 - 12.3 FL Final    ABSOLUTE NRBC 05/24/2019 0.00  0.0 - 0.2 K/uL Final    **Note: Absolute NRBC parameter is now reported with Hemogram**    DF 05/24/2019 AUTOMATED    Final    NEUTROPHILS 05/24/2019 71  43 - 78 % Final    LYMPHOCYTES 05/24/2019 17  13 - 44 % Final    MONOCYTES 05/24/2019 8  4.0 - 12.0 % Final    EOSINOPHILS 05/24/2019 3  0.5 - 7.8 % Final    BASOPHILS 05/24/2019 1  0.0 - 2.0 % Final    IMMATURE GRANULOCYTES 05/24/2019 1  0.0 - 5.0 % Final    ABS. NEUTROPHILS 05/24/2019 3.1  1.7 - 8.2 K/UL Final    ABS. LYMPHOCYTES 05/24/2019 0.7  0.5 - 4.6 K/UL Final    ABS. MONOCYTES 05/24/2019 0.4  0.1 - 1.3 K/UL Final    ABS. EOSINOPHILS 05/24/2019 0.1  0.0 - 0.8 K/UL Final    ABS. BASOPHILS 05/24/2019 0.0  0.0 - 0.2 K/UL Final    ABS. IMM. GRANS. 05/24/2019 0.0  0.0 - 0.5 K/UL Final    Sodium 05/24/2019 141  136 - 145 mmol/L Final    Potassium 05/24/2019 4.3  3.5 - 5.1 mmol/L Final    Chloride 05/24/2019 106  98 - 107 mmol/L Final    CO2 05/24/2019 28  21 - 32 mmol/L Final    Anion gap 05/24/2019 7  7 - 16 mmol/L Final    Glucose 05/24/2019 113* 65 - 100 mg/dL Final    Comment: 47 - 60 mg/dl Consistent with, but not fully diagnostic of hypoglycemia.   101 - 125 mg/dl Impaired fasting glucose/consistent with pre-diabetes mellitus  > 126 mg/dl Fasting glucose consistent with overt diabetes mellitus      BUN 05/24/2019 17  8 - 23 MG/DL Final    Creatinine 05/24/2019 1.12  0.8 - 1.5 MG/DL Final    GFR est AA 05/24/2019 >60  >60 ml/min/1.73m2 Final    GFR est non-AA 05/24/2019 >60  >60 ml/min/1.73m2 Final    Comment: (NOTE)  Estimated GFR is calculated using the Modification of Diet in Renal   Disease (MDRD) Study equation, reported for both  Americans   (GFRAA) and non- Americans (GFRNA), and normalized to 1.73m2   body surface area. The physician must decide which value applies to   the patient. The MDRD study equation should only be used in   individuals age 25 or older. It has not been validated for the   following: pregnant women, patients with serious comorbid conditions,   or on certain medications, or persons with extremes of body size,   muscle mass, or nutritional status.  Calcium 05/24/2019 8.8  8.3 - 10.4 MG/DL Final    Special Requests: 05/24/2019 NASAL    Final    Culture result: 05/24/2019 SA target not detected. A MRSA NEGATIVE, SA NEGATIVE test result does not preclude MRSA or SA nasal colonization.     Final    Prothrombin time 05/24/2019 13.5  11.7 - 14.5 sec Final    INR 05/24/2019 1.0    Final    Comment: Suggested therapeutic INR range:  Venous thrombosis and embolus  2.0-3.0  Prosthetic heart valve         2.5-3.5  ** Note new reference range and method **      aPTT 05/24/2019 26.9  24.7 - 39.8 SEC Final    Comment: Heparin Therapeutic Range = 74 - 123 seconds  In addition to factor deficiency, monitoring heparin therapy, etc., evaluation of a prolonged aPTT result should include consideration of preanalytic variables such as heparin flush contamination, specimen integrity issues, etc.      Color 05/24/2019 YELLOW    Final    Appearance 05/24/2019 CLEAR    Final    Specific gravity 05/24/2019 1.025* 1.001 - 1.023   Final    pH (UA) 05/24/2019 5.0  5.0 - 9.0   Final    Protein 05/24/2019 NEGATIVE   NEG mg/dL Final    Glucose 05/24/2019 NEGATIVE   mg/dL Final    Ketone 05/24/2019 NEGATIVE   NEG mg/dL Final    Bilirubin 05/24/2019 NEGATIVE   NEG   Final    Blood 05/24/2019 NEGATIVE   NEG   Final    Urobilinogen 05/24/2019 0.2  0.2 - 1.0 EU/dL Final    Nitrites 05/24/2019 NEGATIVE   NEG   Final    Leukocyte Esterase 05/24/2019 NEGATIVE   NEG   Final   Admission on 03/06/2019, Discharged on 03/08/2019   Component Date Value Ref Range Status    Crossmatch Expiration 03/06/2019 03/09/2019   Final    ABO/Rh(D) 03/06/2019 A NEGATIVE   Final    Antibody screen 03/06/2019 NEG   Final    mm Induration 03/07/2019 0  0 - 5 mm Final    mm Induration 03/08/2019 0  0 - 5 mm Final    Glucose (POC) 03/06/2019 125* 65 - 100 mg/dL Final    Comment: 47 - 60 mg/dl Consistent with, but not fully diagnostic of hypoglycemia. 101 - 125 mg/dl Impaired fasting glucose/consistent with pre-diabetes mellitus  > 126 mg/dl Fasting glucose consistent with overt diabetes mellitus      HGB 03/06/2019 12.9* 13.6 - 17.2 g/dL Final    HGB 03/07/2019 11.2* 13.6 - 17.2 g/dL Final    Sodium 03/07/2019 139  136 - 145 mmol/L Final    Potassium 03/07/2019 3.8  3.5 - 5.1 mmol/L Final    Chloride 03/07/2019 105  98 - 107 mmol/L Final    CO2 03/07/2019 27  21 - 32 mmol/L Final    Anion gap 03/07/2019 7  mmol/L Final    Glucose 03/07/2019 165* 65 - 100 mg/dL Final    Comment: 47 - 60 mg/dl Consistent with, but not fully diagnostic of hypoglycemia.   101 - 125 mg/dl Impaired fasting glucose/consistent with pre-diabetes mellitus  > 126 mg/dl Fasting glucose consistent with overt diabetes mellitus      BUN 03/07/2019 23  8 - 23 MG/DL Final    Creatinine 03/07/2019 1.25  0.8 - 1.5 MG/DL Final    GFR est AA 03/07/2019 >60  >60 ml/min/1.73m2 Final    GFR est non-AA 03/07/2019 60  ml/min/1.73m2 Final    Comment: (NOTE)  Estimated GFR is calculated using the Modification of Diet in Renal   Disease (MDRD) Study equation, reported for both  Americans   (GFRAA) and non- Americans (GFRNA), and normalized to 1.73m2 body surface area. The physician must decide which value applies to   the patient. The MDRD study equation should only be used in   individuals age 25 or older. It has not been validated for the   following: pregnant women, patients with serious comorbid conditions,   or on certain medications, or persons with extremes of body size,   muscle mass, or nutritional status.       Calcium 03/07/2019 8.1* 8.3 - 10.4 MG/DL Final    HGB 03/08/2019 9.8* 13.6 - 17.2 g/dL Final                 Patient Active Problem List   Diagnosis Code    Osteoarthritis of hip M16.9    S/P prosthetic total arthroplasty of the hip Z96.649    HTN (hypertension) I10    BPH (benign prostatic hypertrophy) N40.0    Osteoarthritis of right knee M17.11    Osteoarthritis M19.90    Status post total knee replacement, right Z96.651         Signed By: FRANSISCO Bertrand  May 28, 2019

## 2019-05-30 ENCOUNTER — ANESTHESIA EVENT (OUTPATIENT)
Dept: SURGERY | Age: 74
DRG: 470 | End: 2019-05-30
Payer: MEDICARE

## 2019-05-31 ENCOUNTER — HOSPITAL ENCOUNTER (INPATIENT)
Age: 74
LOS: 3 days | Discharge: NURSING, REHAB OR LONG-TERM CARE | DRG: 470 | End: 2019-06-03
Attending: ORTHOPAEDIC SURGERY | Admitting: ORTHOPAEDIC SURGERY
Payer: MEDICARE

## 2019-05-31 ENCOUNTER — ANESTHESIA (OUTPATIENT)
Dept: SURGERY | Age: 74
DRG: 470 | End: 2019-05-31
Payer: MEDICARE

## 2019-05-31 DIAGNOSIS — Z96.652 STATUS POST TOTAL KNEE REPLACEMENT, LEFT: Primary | ICD-10-CM

## 2019-05-31 DIAGNOSIS — I10 ESSENTIAL HYPERTENSION: ICD-10-CM

## 2019-05-31 LAB
ABO + RH BLD: NORMAL
ATRIAL RATE: 64 BPM
BLOOD GROUP ANTIBODIES SERPL: NORMAL
CALCULATED P AXIS, ECG09: 72 DEGREES
CALCULATED R AXIS, ECG10: -9 DEGREES
CALCULATED T AXIS, ECG11: 120 DEGREES
DIAGNOSIS, 93000: NORMAL
GLUCOSE BLD STRIP.AUTO-MCNC: 147 MG/DL (ref 65–100)
HGB BLD-MCNC: 12.4 G/DL (ref 13.6–17.2)
P-R INTERVAL, ECG05: 178 MS
Q-T INTERVAL, ECG07: 432 MS
QRS DURATION, ECG06: 110 MS
QTC CALCULATION (BEZET), ECG08: 445 MS
SPECIMEN EXP DATE BLD: NORMAL
VENTRICULAR RATE, ECG03: 64 BPM

## 2019-05-31 PROCEDURE — 74011250636 HC RX REV CODE- 250/636: Performed by: ANESTHESIOLOGY

## 2019-05-31 PROCEDURE — 77030013708 HC HNDPC SUC IRR PULS STRY –B: Performed by: ORTHOPAEDIC SURGERY

## 2019-05-31 PROCEDURE — 97110 THERAPEUTIC EXERCISES: CPT

## 2019-05-31 PROCEDURE — 0SRD0J9 REPLACEMENT OF LEFT KNEE JOINT WITH SYNTHETIC SUBSTITUTE, CEMENTED, OPEN APPROACH: ICD-10-PCS | Performed by: PHYSICIAN ASSISTANT

## 2019-05-31 PROCEDURE — 65270000029 HC RM PRIVATE

## 2019-05-31 PROCEDURE — 77030002912 HC SUT ETHBND J&J -A: Performed by: ORTHOPAEDIC SURGERY

## 2019-05-31 PROCEDURE — 77030037363 HC FEM INST CR  DISP STRY -C: Performed by: ORTHOPAEDIC SURGERY

## 2019-05-31 PROCEDURE — 74011250636 HC RX REV CODE- 250/636: Performed by: ORTHOPAEDIC SURGERY

## 2019-05-31 PROCEDURE — 77030020782 HC GWN BAIR PAWS FLX 3M -B: Performed by: ANESTHESIOLOGY

## 2019-05-31 PROCEDURE — 36415 COLL VENOUS BLD VENIPUNCTURE: CPT

## 2019-05-31 PROCEDURE — 77030003602 HC NDL NRV BLK BBMI -B: Performed by: ANESTHESIOLOGY

## 2019-05-31 PROCEDURE — 82962 GLUCOSE BLOOD TEST: CPT

## 2019-05-31 PROCEDURE — 74011250637 HC RX REV CODE- 250/637: Performed by: PHYSICIAN ASSISTANT

## 2019-05-31 PROCEDURE — 77030007880 HC KT SPN EPDRL BBMI -B: Performed by: ANESTHESIOLOGY

## 2019-05-31 PROCEDURE — 97165 OT EVAL LOW COMPLEX 30 MIN: CPT

## 2019-05-31 PROCEDURE — 77030012935 HC DRSG AQUACEL BMS -B: Performed by: ORTHOPAEDIC SURGERY

## 2019-05-31 PROCEDURE — 77030031139 HC SUT VCRL2 J&J -A: Performed by: ORTHOPAEDIC SURGERY

## 2019-05-31 PROCEDURE — 77030035236 HC SUT PDS STRATFX BARB J&J -B: Performed by: ORTHOPAEDIC SURGERY

## 2019-05-31 PROCEDURE — 77030003665 HC NDL SPN BBMI -A: Performed by: ANESTHESIOLOGY

## 2019-05-31 PROCEDURE — 74011000302 HC RX REV CODE- 302: Performed by: ORTHOPAEDIC SURGERY

## 2019-05-31 PROCEDURE — 77030006720 HC BLD PAT RMR ZIMM -B: Performed by: ORTHOPAEDIC SURGERY

## 2019-05-31 PROCEDURE — 77030002966 HC SUT PDS J&J -A: Performed by: ORTHOPAEDIC SURGERY

## 2019-05-31 PROCEDURE — 74011250636 HC RX REV CODE- 250/636

## 2019-05-31 PROCEDURE — 86900 BLOOD TYPING SEROLOGIC ABO: CPT

## 2019-05-31 PROCEDURE — 74011250636 HC RX REV CODE- 250/636: Performed by: PHYSICIAN ASSISTANT

## 2019-05-31 PROCEDURE — 76942 ECHO GUIDE FOR BIOPSY: CPT | Performed by: ORTHOPAEDIC SURGERY

## 2019-05-31 PROCEDURE — 76010010054 HC POST OP PAIN BLOCK: Performed by: ORTHOPAEDIC SURGERY

## 2019-05-31 PROCEDURE — 85018 HEMOGLOBIN: CPT

## 2019-05-31 PROCEDURE — 74011000250 HC RX REV CODE- 250: Performed by: ORTHOPAEDIC SURGERY

## 2019-05-31 PROCEDURE — 77030035643 HC BLD SAW OSC PRECIS STRY -C: Performed by: ORTHOPAEDIC SURGERY

## 2019-05-31 PROCEDURE — C1713 ANCHOR/SCREW BN/BN,TIS/BN: HCPCS | Performed by: ORTHOPAEDIC SURGERY

## 2019-05-31 PROCEDURE — 99221 1ST HOSP IP/OBS SF/LOW 40: CPT | Performed by: PHYSICAL MEDICINE & REHABILITATION

## 2019-05-31 PROCEDURE — C1776 JOINT DEVICE (IMPLANTABLE): HCPCS | Performed by: ORTHOPAEDIC SURGERY

## 2019-05-31 PROCEDURE — 74011000250 HC RX REV CODE- 250

## 2019-05-31 PROCEDURE — 77030018836 HC SOL IRR NACL ICUM -A: Performed by: ORTHOPAEDIC SURGERY

## 2019-05-31 PROCEDURE — 93005 ELECTROCARDIOGRAM TRACING: CPT | Performed by: ANESTHESIOLOGY

## 2019-05-31 PROCEDURE — 97535 SELF CARE MNGMENT TRAINING: CPT

## 2019-05-31 PROCEDURE — 94760 N-INVAS EAR/PLS OXIMETRY 1: CPT

## 2019-05-31 PROCEDURE — 86580 TB INTRADERMAL TEST: CPT | Performed by: ORTHOPAEDIC SURGERY

## 2019-05-31 PROCEDURE — 76010000172 HC OR TIME 2.5 TO 3 HR INTENSV-TIER 1: Performed by: ORTHOPAEDIC SURGERY

## 2019-05-31 PROCEDURE — 77030037364 HC TIB INST CR  DISP STRY -C: Performed by: ORTHOPAEDIC SURGERY

## 2019-05-31 PROCEDURE — 77030008467 HC STPLR SKN COVD -B: Performed by: ORTHOPAEDIC SURGERY

## 2019-05-31 PROCEDURE — 74011250637 HC RX REV CODE- 250/637: Performed by: ANESTHESIOLOGY

## 2019-05-31 PROCEDURE — 76210000016 HC OR PH I REC 1 TO 1.5 HR: Performed by: ORTHOPAEDIC SURGERY

## 2019-05-31 PROCEDURE — 97161 PT EVAL LOW COMPLEX 20 MIN: CPT

## 2019-05-31 PROCEDURE — 94762 N-INVAS EAR/PLS OXIMTRY CONT: CPT

## 2019-05-31 PROCEDURE — 74011000258 HC RX REV CODE- 258: Performed by: ORTHOPAEDIC SURGERY

## 2019-05-31 PROCEDURE — 76060000036 HC ANESTHESIA 2.5 TO 3 HR: Performed by: ORTHOPAEDIC SURGERY

## 2019-05-31 PROCEDURE — 3E0T3BZ INTRODUCTION OF ANESTHETIC AGENT INTO PERIPHERAL NERVES AND PLEXI, PERCUTANEOUS APPROACH: ICD-10-PCS | Performed by: ANESTHESIOLOGY

## 2019-05-31 PROCEDURE — 77030019557 HC ELECTRD VES SEAL MEDT -F: Performed by: ORTHOPAEDIC SURGERY

## 2019-05-31 DEVICE — BASEPLT TIB PC TRITNM SZ 7 -- TRIATHLON: Type: IMPLANTABLE DEVICE | Site: KNEE | Status: FUNCTIONAL

## 2019-05-31 DEVICE — (D)CEMENT BNE HV R 40GM -- DUPE USE ITEM 353850: Type: IMPLANTABLE DEVICE | Site: KNEE | Status: FUNCTIONAL

## 2019-05-31 DEVICE — COMPONENT FEM SZ 7 L KNEE TRITANIUM PERI APATITE POST STBL: Type: IMPLANTABLE DEVICE | Site: KNEE | Status: FUNCTIONAL

## 2019-05-31 DEVICE — PAT ASYM MTL-BK 11MM SZ A38 -- TRIATHLON: Type: IMPLANTABLE DEVICE | Site: KNEE | Status: FUNCTIONAL

## 2019-05-31 RX ORDER — DIPHENHYDRAMINE HCL 25 MG
25 CAPSULE ORAL
Status: DISCONTINUED | OUTPATIENT
Start: 2019-05-31 | End: 2019-06-03 | Stop reason: HOSPADM

## 2019-05-31 RX ORDER — DEXAMETHASONE SODIUM PHOSPHATE 4 MG/ML
INJECTION, SOLUTION INTRA-ARTICULAR; INTRALESIONAL; INTRAMUSCULAR; INTRAVENOUS; SOFT TISSUE AS NEEDED
Status: DISCONTINUED | OUTPATIENT
Start: 2019-05-31 | End: 2019-05-31 | Stop reason: HOSPADM

## 2019-05-31 RX ORDER — ROPIVACAINE HYDROCHLORIDE 2 MG/ML
INJECTION, SOLUTION EPIDURAL; INFILTRATION; PERINEURAL AS NEEDED
Status: DISCONTINUED | OUTPATIENT
Start: 2019-05-31 | End: 2019-05-31 | Stop reason: HOSPADM

## 2019-05-31 RX ORDER — DEXAMETHASONE SODIUM PHOSPHATE 4 MG/ML
INJECTION, SOLUTION INTRA-ARTICULAR; INTRALESIONAL; INTRAMUSCULAR; INTRAVENOUS; SOFT TISSUE
Status: COMPLETED | OUTPATIENT
Start: 2019-05-31 | End: 2019-05-31

## 2019-05-31 RX ORDER — LIDOCAINE HYDROCHLORIDE 20 MG/ML
INJECTION, SOLUTION EPIDURAL; INFILTRATION; INTRACAUDAL; PERINEURAL AS NEEDED
Status: DISCONTINUED | OUTPATIENT
Start: 2019-05-31 | End: 2019-05-31 | Stop reason: HOSPADM

## 2019-05-31 RX ORDER — TRANEXAMIC ACID 100 MG/ML
INJECTION, SOLUTION INTRAVENOUS AS NEEDED
Status: DISCONTINUED | OUTPATIENT
Start: 2019-05-31 | End: 2019-05-31 | Stop reason: HOSPADM

## 2019-05-31 RX ORDER — HYDROMORPHONE HYDROCHLORIDE 2 MG/1
2 TABLET ORAL
Qty: 40 TAB | Refills: 0 | Status: SHIPPED | OUTPATIENT
Start: 2019-05-31 | End: 2019-06-30

## 2019-05-31 RX ORDER — HYDROCHLOROTHIAZIDE 25 MG/1
25 TABLET ORAL DAILY
Status: DISCONTINUED | OUTPATIENT
Start: 2019-06-01 | End: 2019-06-03 | Stop reason: HOSPADM

## 2019-05-31 RX ORDER — NEOMYCIN AND POLYMYXIN B SULFATES 40; 200000 MG/ML; [USP'U]/ML
SOLUTION IRRIGATION AS NEEDED
Status: DISCONTINUED | OUTPATIENT
Start: 2019-05-31 | End: 2019-05-31 | Stop reason: HOSPADM

## 2019-05-31 RX ORDER — ACETAMINOPHEN 10 MG/ML
1000 INJECTION, SOLUTION INTRAVENOUS ONCE
Status: COMPLETED | OUTPATIENT
Start: 2019-05-31 | End: 2019-05-31

## 2019-05-31 RX ORDER — SODIUM CHLORIDE 0.9 % (FLUSH) 0.9 %
5-40 SYRINGE (ML) INJECTION EVERY 8 HOURS
Status: DISCONTINUED | OUTPATIENT
Start: 2019-05-31 | End: 2019-06-03 | Stop reason: HOSPADM

## 2019-05-31 RX ORDER — TAMSULOSIN HYDROCHLORIDE 0.4 MG/1
0.4 CAPSULE ORAL DAILY
Status: DISCONTINUED | OUTPATIENT
Start: 2019-06-01 | End: 2019-06-03 | Stop reason: HOSPADM

## 2019-05-31 RX ORDER — FENTANYL CITRATE 50 UG/ML
100 INJECTION, SOLUTION INTRAMUSCULAR; INTRAVENOUS ONCE
Status: COMPLETED | OUTPATIENT
Start: 2019-05-31 | End: 2019-05-31

## 2019-05-31 RX ORDER — ASPIRIN 81 MG/1
81 TABLET ORAL EVERY 12 HOURS
Qty: 60 TAB | Refills: 1 | Status: SHIPPED | OUTPATIENT
Start: 2019-05-31 | End: 2019-07-05

## 2019-05-31 RX ORDER — AMOXICILLIN 250 MG
2 CAPSULE ORAL DAILY
Status: DISCONTINUED | OUTPATIENT
Start: 2019-06-01 | End: 2019-06-03 | Stop reason: HOSPADM

## 2019-05-31 RX ORDER — SODIUM CHLORIDE 0.9 % (FLUSH) 0.9 %
5-40 SYRINGE (ML) INJECTION AS NEEDED
Status: DISCONTINUED | OUTPATIENT
Start: 2019-05-31 | End: 2019-06-03 | Stop reason: HOSPADM

## 2019-05-31 RX ORDER — NALOXONE HYDROCHLORIDE 0.4 MG/ML
0.04 INJECTION, SOLUTION INTRAMUSCULAR; INTRAVENOUS; SUBCUTANEOUS
Status: DISCONTINUED | OUTPATIENT
Start: 2019-05-31 | End: 2019-05-31 | Stop reason: HOSPADM

## 2019-05-31 RX ORDER — ASPIRIN 81 MG/1
81 TABLET ORAL EVERY 12 HOURS
Status: DISCONTINUED | OUTPATIENT
Start: 2019-05-31 | End: 2019-06-03 | Stop reason: HOSPADM

## 2019-05-31 RX ORDER — MIDAZOLAM HYDROCHLORIDE 1 MG/ML
2 INJECTION, SOLUTION INTRAMUSCULAR; INTRAVENOUS
Status: DISCONTINUED | OUTPATIENT
Start: 2019-05-31 | End: 2019-05-31 | Stop reason: HOSPADM

## 2019-05-31 RX ORDER — ONDANSETRON 2 MG/ML
4 INJECTION INTRAMUSCULAR; INTRAVENOUS
Status: DISCONTINUED | OUTPATIENT
Start: 2019-05-31 | End: 2019-06-03 | Stop reason: HOSPADM

## 2019-05-31 RX ORDER — ACETAMINOPHEN 500 MG
1000 TABLET ORAL EVERY 6 HOURS
Status: DISCONTINUED | OUTPATIENT
Start: 2019-06-01 | End: 2019-06-03 | Stop reason: HOSPADM

## 2019-05-31 RX ORDER — DEXAMETHASONE SODIUM PHOSPHATE 100 MG/10ML
10 INJECTION INTRAMUSCULAR; INTRAVENOUS ONCE
Status: ACTIVE | OUTPATIENT
Start: 2019-06-01 | End: 2019-06-02

## 2019-05-31 RX ORDER — CELECOXIB 200 MG/1
200 CAPSULE ORAL ONCE
Status: COMPLETED | OUTPATIENT
Start: 2019-05-31 | End: 2019-05-31

## 2019-05-31 RX ORDER — DOCUSATE SODIUM 100 MG/1
100 CAPSULE, LIQUID FILLED ORAL DAILY
Status: DISCONTINUED | OUTPATIENT
Start: 2019-06-01 | End: 2019-06-03 | Stop reason: HOSPADM

## 2019-05-31 RX ORDER — KETOROLAC TROMETHAMINE 30 MG/ML
INJECTION, SOLUTION INTRAMUSCULAR; INTRAVENOUS AS NEEDED
Status: DISCONTINUED | OUTPATIENT
Start: 2019-05-31 | End: 2019-05-31 | Stop reason: HOSPADM

## 2019-05-31 RX ORDER — MIDAZOLAM HYDROCHLORIDE 1 MG/ML
2 INJECTION, SOLUTION INTRAMUSCULAR; INTRAVENOUS ONCE
Status: COMPLETED | OUTPATIENT
Start: 2019-05-31 | End: 2019-05-31

## 2019-05-31 RX ORDER — ACETAMINOPHEN 500 MG
1000 TABLET ORAL ONCE
Status: DISCONTINUED | OUTPATIENT
Start: 2019-05-31 | End: 2019-05-31 | Stop reason: HOSPADM

## 2019-05-31 RX ORDER — FINASTERIDE 5 MG/1
5 TABLET, FILM COATED ORAL
Status: DISCONTINUED | OUTPATIENT
Start: 2019-05-31 | End: 2019-06-03 | Stop reason: HOSPADM

## 2019-05-31 RX ORDER — SODIUM CHLORIDE, SODIUM LACTATE, POTASSIUM CHLORIDE, CALCIUM CHLORIDE 600; 310; 30; 20 MG/100ML; MG/100ML; MG/100ML; MG/100ML
100 INJECTION, SOLUTION INTRAVENOUS CONTINUOUS
Status: DISCONTINUED | OUTPATIENT
Start: 2019-05-31 | End: 2019-05-31 | Stop reason: HOSPADM

## 2019-05-31 RX ORDER — ONDANSETRON 2 MG/ML
INJECTION INTRAMUSCULAR; INTRAVENOUS AS NEEDED
Status: DISCONTINUED | OUTPATIENT
Start: 2019-05-31 | End: 2019-05-31 | Stop reason: HOSPADM

## 2019-05-31 RX ORDER — NALOXONE HYDROCHLORIDE 0.4 MG/ML
.2-.4 INJECTION, SOLUTION INTRAMUSCULAR; INTRAVENOUS; SUBCUTANEOUS
Status: DISCONTINUED | OUTPATIENT
Start: 2019-05-31 | End: 2019-06-03 | Stop reason: HOSPADM

## 2019-05-31 RX ORDER — SODIUM CHLORIDE 9 MG/ML
100 INJECTION, SOLUTION INTRAVENOUS CONTINUOUS
Status: DISPENSED | OUTPATIENT
Start: 2019-05-31 | End: 2019-06-02

## 2019-05-31 RX ORDER — HYDROMORPHONE HYDROCHLORIDE 2 MG/1
2 TABLET ORAL
Status: DISCONTINUED | OUTPATIENT
Start: 2019-05-31 | End: 2019-06-03 | Stop reason: HOSPADM

## 2019-05-31 RX ORDER — HYDROMORPHONE HYDROCHLORIDE 2 MG/ML
0.5 INJECTION, SOLUTION INTRAMUSCULAR; INTRAVENOUS; SUBCUTANEOUS
Status: DISCONTINUED | OUTPATIENT
Start: 2019-05-31 | End: 2019-05-31 | Stop reason: HOSPADM

## 2019-05-31 RX ORDER — MIDAZOLAM HYDROCHLORIDE 1 MG/ML
INJECTION, SOLUTION INTRAMUSCULAR; INTRAVENOUS AS NEEDED
Status: DISCONTINUED | OUTPATIENT
Start: 2019-05-31 | End: 2019-05-31 | Stop reason: HOSPADM

## 2019-05-31 RX ORDER — PROPOFOL 10 MG/ML
INJECTION, EMULSION INTRAVENOUS
Status: DISCONTINUED | OUTPATIENT
Start: 2019-05-31 | End: 2019-05-31 | Stop reason: HOSPADM

## 2019-05-31 RX ORDER — CELECOXIB 200 MG/1
200 CAPSULE ORAL EVERY 12 HOURS
Status: DISCONTINUED | OUTPATIENT
Start: 2019-05-31 | End: 2019-06-03 | Stop reason: HOSPADM

## 2019-05-31 RX ORDER — OXYCODONE HYDROCHLORIDE 5 MG/1
5 TABLET ORAL
Status: DISCONTINUED | OUTPATIENT
Start: 2019-05-31 | End: 2019-05-31 | Stop reason: HOSPADM

## 2019-05-31 RX ORDER — CEFAZOLIN SODIUM/WATER 2 G/20 ML
2 SYRINGE (ML) INTRAVENOUS ONCE
Status: COMPLETED | OUTPATIENT
Start: 2019-05-31 | End: 2019-05-31

## 2019-05-31 RX ORDER — CEFAZOLIN SODIUM/WATER 2 G/20 ML
2 SYRINGE (ML) INTRAVENOUS EVERY 8 HOURS
Status: COMPLETED | OUTPATIENT
Start: 2019-05-31 | End: 2019-05-31

## 2019-05-31 RX ORDER — LIDOCAINE HYDROCHLORIDE 10 MG/ML
0.1 INJECTION INFILTRATION; PERINEURAL AS NEEDED
Status: DISCONTINUED | OUTPATIENT
Start: 2019-05-31 | End: 2019-05-31 | Stop reason: HOSPADM

## 2019-05-31 RX ORDER — HYDROMORPHONE HYDROCHLORIDE 2 MG/ML
1 INJECTION, SOLUTION INTRAMUSCULAR; INTRAVENOUS; SUBCUTANEOUS
Status: DISCONTINUED | OUTPATIENT
Start: 2019-05-31 | End: 2019-06-03 | Stop reason: HOSPADM

## 2019-05-31 RX ORDER — TERAZOSIN 5 MG/1
10 CAPSULE ORAL
Status: DISCONTINUED | OUTPATIENT
Start: 2019-05-31 | End: 2019-06-03 | Stop reason: HOSPADM

## 2019-05-31 RX ADMIN — SODIUM CHLORIDE 100 ML/HR: 900 INJECTION, SOLUTION INTRAVENOUS at 13:57

## 2019-05-31 RX ADMIN — Medication 2 G: at 17:33

## 2019-05-31 RX ADMIN — SODIUM CHLORIDE, SODIUM LACTATE, POTASSIUM CHLORIDE, AND CALCIUM CHLORIDE: 600; 310; 30; 20 INJECTION, SOLUTION INTRAVENOUS at 09:49

## 2019-05-31 RX ADMIN — ASPIRIN 81 MG: 81 TABLET ORAL at 21:08

## 2019-05-31 RX ADMIN — CELECOXIB 200 MG: 200 CAPSULE ORAL at 08:11

## 2019-05-31 RX ADMIN — ACETAMINOPHEN 1000 MG: 10 INJECTION, SOLUTION INTRAVENOUS at 17:34

## 2019-05-31 RX ADMIN — LIDOCAINE HYDROCHLORIDE 100 MG: 20 INJECTION, SOLUTION EPIDURAL; INFILTRATION; INTRACAUDAL; PERINEURAL at 11:33

## 2019-05-31 RX ADMIN — Medication 1 AMPULE: at 21:08

## 2019-05-31 RX ADMIN — HYDROMORPHONE HYDROCHLORIDE 2 MG: 2 TABLET ORAL at 21:08

## 2019-05-31 RX ADMIN — PROPOFOL 50 MCG/KG/MIN: 10 INJECTION, EMULSION INTRAVENOUS at 10:01

## 2019-05-31 RX ADMIN — DEXAMETHASONE SODIUM PHOSPHATE 5 MG: 4 INJECTION, SOLUTION INTRA-ARTICULAR; INTRALESIONAL; INTRAMUSCULAR; INTRAVENOUS; SOFT TISSUE at 09:18

## 2019-05-31 RX ADMIN — HYDROMORPHONE HYDROCHLORIDE 2 MG: 2 TABLET ORAL at 14:50

## 2019-05-31 RX ADMIN — FINASTERIDE 5 MG: 5 TABLET, FILM COATED ORAL at 21:08

## 2019-05-31 RX ADMIN — Medication 3 AMPULE: at 08:10

## 2019-05-31 RX ADMIN — TERAZOSIN HYDROCHLORIDE 10 MG: 5 CAPSULE ORAL at 21:08

## 2019-05-31 RX ADMIN — TRANEXAMIC ACID 1000 MG: 100 INJECTION, SOLUTION INTRAVENOUS at 10:04

## 2019-05-31 RX ADMIN — TUBERCULIN PURIFIED PROTEIN DERIVATIVE 5 UNITS: 5 INJECTION, SOLUTION INTRADERMAL at 08:13

## 2019-05-31 RX ADMIN — MIDAZOLAM HYDROCHLORIDE 2 MG: 1 INJECTION, SOLUTION INTRAMUSCULAR; INTRAVENOUS at 09:49

## 2019-05-31 RX ADMIN — MIDAZOLAM 2 MG: 1 INJECTION INTRAMUSCULAR; INTRAVENOUS at 09:15

## 2019-05-31 RX ADMIN — ONDANSETRON 4 MG: 2 INJECTION INTRAMUSCULAR; INTRAVENOUS at 10:04

## 2019-05-31 RX ADMIN — DEXAMETHASONE SODIUM PHOSPHATE 10 MG: 4 INJECTION, SOLUTION INTRA-ARTICULAR; INTRALESIONAL; INTRAMUSCULAR; INTRAVENOUS; SOFT TISSUE at 10:04

## 2019-05-31 RX ADMIN — FENTANYL CITRATE 100 MCG: 50 INJECTION INTRAMUSCULAR; INTRAVENOUS at 09:15

## 2019-05-31 RX ADMIN — Medication 2 G: at 21:08

## 2019-05-31 RX ADMIN — LIDOCAINE HYDROCHLORIDE 0.1 ML: 10 INJECTION, SOLUTION INFILTRATION; PERINEURAL at 08:12

## 2019-05-31 RX ADMIN — SODIUM CHLORIDE, SODIUM LACTATE, POTASSIUM CHLORIDE, AND CALCIUM CHLORIDE 100 ML/HR: 600; 310; 30; 20 INJECTION, SOLUTION INTRAVENOUS at 08:11

## 2019-05-31 RX ADMIN — CELECOXIB 200 MG: 200 CAPSULE ORAL at 21:08

## 2019-05-31 RX ADMIN — Medication 2 G: at 09:49

## 2019-05-31 NOTE — PROGRESS NOTES
Admission assessment complete. Patient in bed. Instructed on use of lighting, menu, fall risk, knee precautions and incentive spirometer. Dressing dry and intact. Lower extremities numbness and tingling, warm with palpable pulses bilaterally. Positive dorsiflexion and plantar flexion. Patient denies needs at present. Instructed not to get up by themselves and call for assistance or any needs. Patient verbalized understanding. Call bell within reach. Side rails up x3. Bed low and locked. No distress noted. Family members at bedside.

## 2019-05-31 NOTE — ANESTHESIA PROCEDURE NOTES
Peripheral Block    Start time: 5/31/2019 9:15 AM  End time: 5/31/2019 9:18 AM  Performed by: Caroline Montoya MD  Authorized by: Caroline Montoya MD       Pre-procedure: Indications: at surgeon's request and post-op pain management    Preanesthetic Checklist: patient identified, risks and benefits discussed, site marked, timeout performed, anesthesia consent given and patient being monitored    Timeout Time: 09:15          Block Type:   Block Type: Adductor canal  Laterality:  Left  Monitoring:  Standard ASA monitoring, continuous pulse ox, frequent vital sign checks, heart rate, oxygen and responsive to questions  Injection Technique:  Single shot  Procedures: ultrasound guided    Prep: chlorhexidine    Location:  Mid thigh  Needle Type:  Stimuplex (4 inch)  Needle Gauge:  20 G  Needle Localization:  Ultrasound guidance    Assessment:  Number of attempts:  1  Injection Assessment:  Incremental injection every 5 mL, local visualized surrounding nerve on ultrasound, negative aspiration for blood, no intravascular symptoms, no paresthesia and ultrasound image on chart  Patient tolerance:  Patient tolerated the procedure well with no immediate complications  3 cc 1% Lidocaine injected at needle insertion site. Needle inserted and placed in close proximity to the nerve under real time ultrasound guidance. Ultrasound was used to visualize the spread of local anesthetic in close proximity to the nerve being blocked. The nerve appeared anatomically normal and there were no abnormal findings.

## 2019-05-31 NOTE — CONSULTS
Pt LEFT TOTAL KNEE ARTHROPLASTY SPINAL/FNB CHRISTOPHER (Left)    Chart reviewed. Will sign off. Call us prn.

## 2019-05-31 NOTE — ANESTHESIA PROCEDURE NOTES
Spinal Block    Performed by: Jennifer Vela MD  Authorized by: Jennifer Vela MD     Pre-procedure:   Indications: primary anesthetic  Preanesthetic Checklist: patient identified, risks and benefits discussed, anesthesia consent, patient being monitored and timeout performed    Timeout Time: 09:53          Spinal Block:   Patient Position:  Seated  Prep Region:  Lumbar  Prep: chlorhexidine and patient draped      Location:  L3-4  Technique:  Single shot    Local Dose (mL):  3    Needle:   Needle Type:  Pencan  Needle Gauge:  25 G  Attempts:  1      Events: CSF confirmed, no blood with aspiration and no paresthesia        Assessment:  Insertion:  Uncomplicated  Patient tolerance:  Patient tolerated the procedure well with no immediate complications

## 2019-05-31 NOTE — ANESTHESIA POSTPROCEDURE EVALUATION
Procedure(s):  LEFT TOTAL KNEE ARTHROPLASTY SPINAL/FNB CHRISTOPHER.    spinal    Anesthesia Post Evaluation        Patient location during evaluation: PACU  Patient participation: complete - patient participated  Level of consciousness: awake  Pain management: satisfactory to patient  Airway patency: patent  Anesthetic complications: yes (Intraop [address] 1, see intraop and notes section of chart.)  Cardiovascular status: hemodynamically stable  Respiratory status: spontaneous ventilation  Hydration status: euvolemic  Post anesthesia nausea and vomiting:  none      Vitals Value Taken Time   /85 5/31/2019  1:22 PM   Temp 36.7 °C (98 °F) 5/31/2019  1:22 PM   Pulse 63 5/31/2019  1:37 PM   Resp 15 5/31/2019  1:37 PM   SpO2 98 % 5/31/2019  1:37 PM   Pt remained on NSR during PACU stay. Will place on remote telemetry.

## 2019-05-31 NOTE — CONSULTS
Physical Medicine & Rehabilitation Note-consult    Patient: Katie Tony MRN: [id number]  SSN: [id number]    YOB: 1945  Age: 75 y.o. Sex: male      Admit Date: 5/31/2019  Admitting Physician: Mami Sam MD    Medical Decision Making/Plan/Recommend:  Gait impairment and functional deficits. Therapy progressing steadily. Patient is at [facility] with transfers, and ambulation using a RW. Best care option is admission to a sub acute rehab program at [facility]. Patient will benefit from continued daily skilled rehabilitation efforts and regular medical and nursing care at SNF. Continue PT, OT for active/assisted/passive left TKA ROM, strengthening, mobility, transfers, gait training. Will follow progress. Chief Complaint : Gait dysfunction secondary to below. Admit Diagnosis: Primary osteoarthritis of left knee [M17.12]; Osteoarthritis [M19.90]  left total knee arthroplasty 5/31/2019  Pain  DVT risk  Post op acute blood loss anemia  R TKA 3/6/2019  CMT (Charcot-Leslie-Tooth disease)  Osteoarthritis   Hypertension  Acute Rehab Dx:  Gait impairment  Mobility and ambulation deficits  Self Care/ADL deficits    Medical Dx:  Past Medical History:   Diagnosis Date    Arthritis     Avascular necrosis ([address]     right hip    BPH (benign prostatic hyperplasia)     medication    CMT (Charcot-Leslie-Tooth disease)     Followed by Dr. 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 male patient at [address] [place] [address] who was admitted on 5/31/2019  by Yuly Liao MD with below mentioned medical history, is being seen for Physical Medicine and Rehabilitation consult. Deena Olivera presented with worseneing left knee pain when weight bearing, walking and with activity due to end stage DJD. The symptoms were not adequately managed with conservative management and has limited the patient's function. Patient underwent a left total knee arthroplasty per Dr. Yuly Liao MD on 5/31/2019. The patient's post operative course has been uncomplicated. Pain, and common post op issues well tolerated. Patient is to be WBAT LLE. Patient is starting to stand, taking steps working with acute PT and OT. Patient is making expected gains with ambulation, mobility, and ROM. Patient shows significant functional deficits, gait dysfunciton due to knee pain, decreased ROM and strength. Deena Olivera is seen and examined today. Medical Records reviewed. Patient has been independent with ambulation, prior to admission, limited by left knee pain. Current Level of Function:  bed mobility - CGA, transfers - CGA, decreased balance , ambulation - 100' with RW and CGA    Prior Level of Function/Work/Activity:   Pt was independent with a cane prior to this admission.       Family History   Problem Relation Age of Onset   [facility] Arthritis-osteo Mother     Cancer Father     Diabetes Father     Hypertension Other         gen fam hx      Social History     Tobacco Use    Smoking status: Never Smoker    Smokeless tobacco: Never Used   Substance Use Topics    Alcohol use: No     Past Surgical History:   Procedure Laterality Date    HX BACK SURGERY      Lumbar laminectomy- no hardware     HX HIP REPLACEMENT Right 10/2011    total    HX KNEE REPLACEMENT Right 03/06/2019    HX OTHER SURGICAL  2002    skin cancer removed from lip, plastic surgery afterward    HX OTHER SURGICAL      skin cancer from the ear,left leg and behind the area    HX TONSILLECTOMY        Prior to Admission medications    Medication Sig Start Date End Date Taking? Authorizing Provider   aspirin delayed-release 81 mg tablet Take 1 Tab by mouth every twelve (12) hours for 35 days. 5/31/19 7/5/19 Yes FRANSISCO Santiago   HYDROmorphone (DILAUDID) 2 mg tablet Take 1 Tab by mouth every four (4) hours as needed for Pain for up to 30 days. Max Daily Amount: 12 mg. 5/31/19 6/30/19 Yes FRANSISCO Santiago   diclofenac EC (VOLTAREN) 75 mg EC tablet Take 75 mg by mouth daily. Yes Provider, Historical   finasteride (PROSCAR) 5 mg tablet Take 5 mg by mouth nightly. Indications: enlarged prostate with urination problem   Yes Provider, Historical   HYDROcodone-acetaminophen (NORCO) 5-325 mg per tablet Take  by mouth every four (4) hours as needed for Pain. Indications: Pain, for 3 days s/p tooth removal 5-23-19   Yes Provider, Historical   diphenhydrAMINE (BENADRYL) 25 mg capsule Take 25 mg by mouth nightly. Indications: inflammation of the nose due to an allergy   Yes Provider, Historical   acetaminophen (TYLENOL) 500 mg tablet Take 500-1,000 mg by mouth every six (6) hours as needed for Pain. Yes Elias Hernandez MD   cholecalciferol, VITAMIN D3, (VITAMIN D3) 5,000 unit tab tablet Take 5,000 Units by mouth every seven (7) days. on Mondays   Yes Provider, Historical   docusate sodium (STOOL SOFTENER) 100 mg capsule Take 100 mg by mouth daily. Yes Provider, Historical   tamsulosin (FLOMAX) 0.4 mg capsule Take 0.4 mg by mouth daily. Yes Provider, Historical   terazosin (HYTRIN) 10 mg capsule Take 10 mg by mouth nightly. Yes Provider, Historical   hydrochlorothiazide (HYDRODIURIL) 25 mg tablet Take 25 mg by mouth daily. Yes Provider, Historical   amoxicillin (AMOXIL) 875 mg tablet Take 875 mg by mouth two (2) times a day. Indications: take 7 days s/p tooth removal on 5-23-19    Provider, Historical   tadalafil (CIALIS) 5 mg tablet Take 1 Tab by mouth daily.   Patient taking differently: Take 5 mg by mouth nightly. 19   Fonda Goldmann, MD     Allergies   Allergen Reactions    Adhesive Rash        Review of Systems: +left knee pain, +antalgic gait. Denies chest pain, shortness of breath, cough, headache, visual problems, abdominal pain, dysurea, calf pain. Pertinent positives are as noted in the medical records and unremarkable otherwise. Objective:     Vitals:  Blood pressure 138/67, pulse (!) 108, temperature 98 °F (36.7 °C), resp. rate 16, height 6' 3\" (1.905 m), weight 243 lb 11.2 oz (110.5 kg), SpO2 95 %. Temp (24hrs), Av.9 °F (36.6 °C), Min:97.7 °F (36.5 °C), Max:98.1 °F (36.7 °C)    BMI (calculated): 30.5 (19 1056)   Intake and Output:   1901 -  0700  In: 8377 [P.O.:480; I.V.:1100]  Out: 500 [Urine:200]    Physical Exam:   General: Alert and age appropriately oriented. HEENT: Normocephalic,   No JVD. Lungs: Clear to auscultation. Heart: Regular rate and rhythm, S1, S2   No  Murmurs. Abdomen: Soft, non-tender, non-distended. Genitourinary: defered   Neuromuscular:      Grossly no focal motor deficits. Left knee extension strong  Left ankle dorsiflexion 5/5  Left ankle plantarflexion 5/5  No sensory deficits distally BLE to soft touch. Skin/extremity: Non tender calves BLE. No rashes, no marginal erythema.                                                                                          Labs/Studies:  Recent Results (from the past 72 hour(s))   TYPE & SCREEN    Collection Time: 19  8:10 AM   Result Value Ref Range    Crossmatch Expiration 2019     ABO/Rh(D) A NEGATIVE     Antibody screen NEG    GLUCOSE, POC    Collection Time: 19  8:28 AM   Result Value Ref Range    Glucose (POC) 147 (H) 65 - 100 mg/dL   EKG, 12 LEAD, INITIAL    Collection Time: 19 11:48 AM   Result Value Ref Range    Ventricular Rate 64 BPM    Atrial Rate 64 BPM    P-R Interval 178 ms    QRS Duration 110 ms    Q-T Interval 432 ms    QTC Calculation (Bezet) 445 ms    Calculated P Axis 72 degrees    Calculated R Axis -9 degrees    Calculated T Axis 120 degrees    Diagnosis       Normal sinus rhythm  Incomplete right bundle branch block  Nonspecific T wave abnormality  Abnormal ECG  When compared with ECG of 19-FEB-2019 14:28,  QRS duration has decreased  ST elevation now present in Anterior leads  Nonspecific T wave abnormality no longer evident in Anterior leads  T wave inversion now evident in Lateral leads  Confirmed by VINCE GILMORE (), Thais Stewart ([id number]) on 5/31/2019 2:07:54 PM     HEMOGLOBIN    Collection Time: 05/31/19  6:31 PM   Result Value Ref Range    HGB 12.4 (L) 13.6 - 17.2 g/dL   HEMOGLOBIN    Collection Time: 06/01/19  5:23 AM   Result Value Ref Range    HGB 10.6 (L) 13.6 - 26.8 g/dL   METABOLIC PANEL, BASIC    Collection Time: 06/01/19  5:23 AM   Result Value Ref Range    Sodium 142 136 - 145 mmol/L    Potassium 3.9 3.5 - 5.1 mmol/L    Chloride 108 (H) 98 - 107 mmol/L    CO2 25 21 - 32 mmol/L    Anion gap 9 7 - 16 mmol/L    Glucose 147 (H) 65 - 100 mg/dL    BUN 24 (H) 8 - 23 MG/DL    Creatinine 1.03 0.8 - 1.5 MG/DL    GFR est AA >60 >60 ml/min/1.73m2    GFR est non-AA >60 >60 ml/min/1.73m2    Calcium 7.8 (L) 8.3 - 10.4 MG/DL       Functional Assessment:  Reviewed participation and progress in therapies  Gross Assessment  AROM: Generally decreased, functional(right LE) (05/31/19 1600)  Strength: Generally decreased, functional(right LE) (05/31/19 1600)  Coordination: Generally decreased, functional(right LE) (05/31/19 1600)   Bed Mobility  Supine to Sit: Stand-by assistance (06/01/19 0900)  Sit to Supine: (NT) (06/01/19 0900)  Scooting: Stand-by assistance (06/01/19 0900)   Balance  Sitting: Intact (06/01/19 1125)  Standing: With support (06/01/19 1125)   Grooming  Washing Face: Supervision (06/01/19 1123)           Bed/Mat Mobility  Supine to Sit: Stand-by assistance (06/01/19 0900)  Sit to Supine: (NT) (06/01/19 0900)  Sit to Stand: Supervision (06/01/19 1125)  Stand to Sit: Supervision (06/01/19 1125)  Bed to Chair: Stand-by assistance(with walker) (06/01/19 0900)  Scooting: Stand-by assistance (06/01/19 0900)     Ambulation:  Gait  Speed/Jennie: Delayed (06/01/19 0900)  Step Length: Right shortened (06/01/19 0900)  Stance: Left decreased (06/01/19 0900)  Gait Abnormalities: Antalgic;Decreased step clearance (06/01/19 0900)  Ambulation - Level of Assistance: Stand-by assistance (06/01/19 0900)  Distance (ft): 150 Feet (ft) (06/01/19 0900)  Assistive Device: Deana Loveless, rolling (06/01/19 0900)  Duration: 11 Minutes (06/01/19 0900)    Impression/Plan:     Principal Problem:    Status post total knee replacement, left (5/31/2019)    Active Problems:    Osteoarthritis (3/6/2019)        Current Facility-Administered Medications   Medication Dose Route Frequency Provider Last Rate Last Dose    docusate sodium (COLACE) capsule 100 mg  100 mg Oral DAILY Glennette Must, PA   100 mg at 06/01/19 0912    finasteride (PROSCAR) tablet 5 mg  5 mg Oral QHS Glennette Must, PA   5 mg at 05/31/19 2108    hydroCHLOROthiazide (HYDRODIURIL) tablet 25 mg  25 mg Oral DAILY Glennette Must, PA   25 mg at 06/01/19 0912    tamsulosin (FLOMAX) capsule 0.4 mg  0.4 mg Oral DAILY Glennette Must, PA   0.4 mg at 06/01/19 0911    terazosin (HYTRIN) capsule 10 mg  10 mg Oral QHS Glennette Must, PA   10 mg at 05/31/19 2108    alcohol 62% (NOZIN) nasal  1 Ampule  1 Ampule Topical Q12H Glennette Must, PA   1 Ampule at 06/01/19 0912    0.9% sodium chloride infusion  100 mL/hr IntraVENous CONTINUOUS Glennette Must,  mL/hr at 05/31/19 1357 100 mL/hr at 05/31/19 1357    sodium chloride (NS) flush 5-40 mL  5-40 mL IntraVENous Q8H Glennette Must, PA        sodium chloride (NS) flush 5-40 mL  5-40 mL IntraVENous PRN FRANSISCO Dow        acetaminophen (TYLENOL) tablet 1,000 mg  1,000 mg Oral Q6H FRANSISCO Dow   1,000 mg at 06/01/19 0502  celecoxib (CELEBREX) capsule 200 mg  200 mg Oral Q12H FRANSISCO Martinez   200 mg at 06/01/19 0911    HYDROmorphone (DILAUDID) tablet 2 mg  2 mg Oral Q4H PRN FRANSISCO Martinez   2 mg at 06/01/19 0916    HYDROmorphone (PF) (DILAUDID) injection 1 mg  1 mg IntraVENous Q3H PRN FRANSISCO Martinez        naloxone [facility]) injection 0.2-0.4 mg  0.2-0.4 mg IntraVENous Q10MIN PRN FRANSISCO Martinez        dexamethasone (DECADRON) injection 10 mg  10 mg IntraVENous Daisy Green PA        ondansetron [facility]) injection 4 mg  4 mg IntraVENous Q4H PRN FRANSISCO Martinez        diphenhydrAMINE (BENADRYL) capsule 25 mg  25 mg Oral Q4H PRN FRANSISCO Martinez   25 mg at 06/01/19 0102    senna-docusate (PERICOLACE) 8.6-50 mg per tablet 2 Tab  2 Tab Oral DAILY FRANSISCO Martinez   Stopped at 06/01/19 0900    aspirin delayed-release tablet 81 mg  81 mg Oral Q12H FRANSISCO Martinez   81 mg at 06/01/19 2410        Recommendations: planning for SNF, sub acute rehab. Continue Acute Rehab Program. PT, OT  to focus on  gait training, transfer training, balance activities, ROM and strengthening exercises. Coordination of rehab/medical care. Counseling of Physical Medicine & Rehab care issues management. Rehabilitation Management/ Medical Management: 1. Devices:Walkers, Type: Rolling Walker  2. Consult:Rehab team including PT, OT,  and . 3. Disposition Rehab-discussed with patient. 4. Thigh-high or knee-high GEORGE's when out of bed. 5. DVT Prophylaxis - aspirin 81 mg bid x 35days. 6. Incentive spirometer Q1H while awake  7. Post op hemorrhagic anemia- monitor. Check in am.   8. Activity: WBAT LLE  9. Planned Labs: CBC,BMP  10. Pain Control:   Continue scheduled tylenol, Celebrex and  PRN meds. 11. Wound Care: Keep left TKA wound clean and dry and reinforce dressing PRN. May remove Aquacel 1 week post op ad replace with new one.  Remove staples 12-14 post surgery, when incision appears appropriately closed and apply benzoin and 1/2\" steristrips. Follow up with ORTHO per instructions. Thank you for the opportunity to participate in the care of this patient.     Signed By: Ronnie Landeros MD

## 2019-05-31 NOTE — PROGRESS NOTES
05/31/19 1948   Oxygen Therapy   O2 Sat (%) 98 %   Pulse via Oximetry 66 beats per minute   O2 Device Room air   O2 Flow Rate (L/min) 0 l/min   Patient placed on continuous sat monitor # 8. Monitor history deleted prior to placing on patient. Patient working on [address], achieving [id number]. Very good effort and technique.

## 2019-05-31 NOTE — H&P
The patient has end stage arthritis of the left knee. The patient was seen and examined and there are no changes to the patient's orthopedic condition. The necessity for the joint replacement is still present, and the H&P from the office is still current. The patient will be admitted today for Procedure(s) (LRB):  LEFT TOTAL KNEE ARTHROPLASTY SPINAL/FNB CHRISTOPHER (Left) .

## 2019-05-31 NOTE — PROGRESS NOTES
Problem: Self Care Deficits Care Plan (Adult)  Goal: *Acute Goals and Plan of Care (Insert Text)  Description  GOALS:   DISCHARGE GOALS (in preparation for going home/rehab):  3 days  1. Mr. Juan Veloz will perform one lower body dressing activity with minimal assistance required to demonstrate improved functional mobility and safety. 2.  Mr. Juan Veloz will perform one lower body bathing activity with minimal assistance required to demonstrate improved functional mobility and safety. 3.  Mr. Juan Veloz will perform toileting/toilet transfer with contact guard assistance to demonstrate improved functional mobility and safety. 4.  Mr. Juan Veloz will perform shower transfer with contact guard assistance to demonstrate improved functional mobility and safety. Outcome: Progressing Towards Goal     JOINT CAMP OCCUPATIONAL THERAPY TKA: Initial Assessment, Daily Note, Treatment Day: Day of Assessment and PM 5/31/2019  INPATIENT: Hospital Day: 1  [place] / [place] / Product Type: Medicare /      NAME/AGE/GENDER: Miranda Hodgkin is a 75 y.o. male   PRIMARY DIAGNOSIS:  Primary osteoarthritis of left knee [M17.12]   Procedure(s) and Anesthesia Type:     * LEFT TOTAL KNEE ARTHROPLASTY SPINAL/FNB CHRISTOPHER - Spinal (Left)  ICD-10: Treatment Diagnosis:    · Pain in Left Knee (M25.562)  · Stiffness of Left Knee, Not elsewhere classified (L37.455)      ASSESSMENT:     Mr. Juan Veloz is s/p left TKA and presents with decreased weight bearing on left LE and decreased independence with functional mobility and activities of daily living as compared to baseline level of function and safety. Patient would benefit from skilled Occupational Therapy to maximize independence and safety with self-care task and functional mobility. Pt would also benefit from education on adaptive equipment and safety precautions in preparation for going home.   Pt up to edge of bed and donned underpants and shorts as noted below. Pt will progress well with self care. This section established at most recent assessment   PROBLEM LIST (Impairments causing functional limitations):  1. Decreased Strength  2. Decreased ADL/Functional Activities  3. Decreased Transfer Abilities  4. Increased Pain  5. Increased Fatigue  6. Decreased Flexibility/Joint Mobility  7. Decreased Knowledge of Precautions   INTERVENTIONS PLANNED: (Benefits and precautions of occupational therapy have been discussed with the patient.)  1. Activities of daily living training  2. Adaptive equipment training  3. Balance training  4. Clothing management  5. Donning&doffing training  6. Theraputic activity     TREATMENT PLAN: Frequency/Duration: Follow patient 1-2 times to address above goals. Rehabilitation Potential For Stated Goals: Good     RECOMMENDED REHABILITATION/EQUIPMENT: (at time of discharge pending progress): Continue Skilled Therapy and Home Health: Physical Therapy. OCCUPATIONAL PROFILE AND HISTORY:   History of Present Injury/Illness (Reason for Referral): Pt presents this date s/p (left) TKA. Past Medical History/Comorbidities:   Mr. Washington Jacob  has a past medical history of Arthritis, Avascular necrosis ([address] BPH (benign prostatic hyperplasia), CMT (Charcot-Lesile-Tooth disease), H/O tooth extraction (05/23/2019), Hypertension, Impotence of organic origin, Nocturia, Osteoarthritis of hip (10/19/2011), S/P prosthetic total arthroplasty of the hip (10/19/2011), Skin cancer, Snores, Status post total knee replacement, left (5/31/2019), and Status post total knee replacement, right (3/6/2019). Mr. Washington Jacob  has a past surgical history that includes hx tonsillectomy; hx other surgical (2002); hx hip replacement (Right, 10/2011); hx other surgical; hx back surgery; and hx knee replacement (Right, 03/06/2019).   Social History/Living Environment:   Home Environment: Private residence  One/Two Story Residence: One story  Living Alone: No  Support Systems: Spouse/Significant Other/Partner  Patient Expects to be Discharged to[de-identified] Private residence  Current DME Used/Available at Home: [address], [place], straight, [employer], bedside, Walker  Prior Level of Function/Work/Activity:  Independent      Number of Personal Factors/Comorbidities that affect the Plan of Care: Brief history (0):  LOW COMPLEXITY   ASSESSMENT OF OCCUPATIONAL PERFORMANCE[de-identified]   Most Recent Physical Functioning:   Balance  Sitting: Intact  Standing: Pull to stand; With support                    Coordination  Fine Motor Skills-Upper: Left Intact; Right Intact  Gross Motor Skills-Upper: Left Intact; Right Intact         Mental Status  Neurologic State: Alert  Orientation Level: Oriented X4  Cognition: Appropriate decision making  Perception: Appears intact  Perseveration: No perseveration noted  Safety/Judgement: Awareness of environment                Basic ADLs (From Assessment) Complex ADLs (From Assessment)   Basic ADL  Feeding: Independent  Oral Facial Hygiene/Grooming: Supervision  Bathing: Moderate assistance  Upper Body Dressing: Supervision  Lower Body Dressing: Moderate assistance  Toileting: Moderate assistance     Grooming/Bathing/Dressing Activities of Daily Living     Cognitive Retraining  Safety/Judgement: Awareness of environment                 Functional Transfers  Bathroom Mobility: Moderate assistance  Toilet Transfer : Moderate assistance  Shower Transfer: Moderate assistance     Bed/Mat Mobility  Supine to Sit: Minimum assistance  Sit to Stand: Moderate assistance  Stand to Sit: Minimum assistance  Bed to Chair: Minimum assistance  Scooting: Contact guard assistance         Physical Skills Involved:  1. Range of Motion  2. Balance  3. Strength Cognitive Skills Affected (resulting in the inability to perform in a timely and safe manner): 1. none  Psychosocial Skills Affected:  1.  Environmental Adaptation   Number of elements that affect the Plan of Care: 3-5:  MODERATE COMPLEXITY   CLINICAL DECISION MAKIN[address] [place] [id number] AM-PAC 6 Clicks   Daily Activity Inpatient Short Form  How much help from another person does the patient currently need. .. Total A Lot A Little None   1. Putting on and taking off regular lower body clothing? ? 1   ? 2   ? 3   ? 4   2. Bathing (including washing, rinsing, drying)? ? 1   ? 2   ? 3   ? 4   3. Toileting, which includes using toilet, bedpan or urinal?   ? 1   ? 2   ? 3   ? 4   4. Putting on and taking off regular upper body clothing? ? 1   ? 2   ? 3   ? 4   5. Taking care of personal grooming such as brushing teeth? ? 1   ? 2   ? 3   ? 4   6. Eating meals? ? 1   ? 2   ? 3   ? 4   © , Trustees of [address] [place] [address], under license to [employer]. All rights reserved     Score:  Initial: 19 Most Recent: X (Date: -- )    Interpretation of Tool:  Represents activities that are increasingly more difficult (i.e. Bed mobility, Transfers, Gait). Medical Necessity:     · Patient is expected to demonstrate progress in range of motion, balance and functional technique  ·  to increase independence with self care   · .  Reason for Services/Other Comments:  · Patient would benefit from skilled Occupational Therapy to maximize independence and safety with self-care task and functional mobility. Use of outcome tool(s) and clinical judgement create a POC that gives a: LOW COMPLEXITY            TREATMENT:   (In addition to Assessment/Re-Assessment sessions the following treatments were rendered)     Pre-treatment Symptoms/Complaints:  pt with complaint of pain RN present and gave medication  Pain: Initial:   Pain Intensity 1: 4 4 Post Session:  4     Self Care: (10 min): Procedure(s) (per grid) utilized to improve and/or restore self-care/home management as related to dressing. Required minimal verbal and   cueing to facilitate activities of daily living skills and compensatory activities.   OT evaluation   Treatment/Session Assessment:     Response to Treatment:  pt up to edge and bed and worked on lower body dressing. Education:  ? Home Exercises  ? Fall Precautions  ? Hip Precautions ? Going Home Video  ? Knee/Hip Prosthesis Review  ? Walker Management/Safety ? Adaptive Equipment as Needed       Interdisciplinary Collaboration:   o Physical Therapist  o Occupational Therapist  o Registered Nurse    After treatment position/precautions:   o Up in chair  o Bed/Chair-wheels locked  o Call light within reach  o RN notified  o Family at bedside     Compliance with Program/Exercises: Compliant all of the time, Will assess as treatment progresses. Recommendations/Intent for next treatment session:  Treatment next visit will focus on increasing Mr. Seymours independence with bed mobility, transfers, self care, functional mobility, modalities for pain, and patient education.       Total Treatment Duration:  OT Patient Time In/Time Out  Time In: 7430  Time Out: Vineet Bah 74 Alisha Walker

## 2019-05-31 NOTE — PROGRESS NOTES
Problem: Mobility Impaired (Adult and Pediatric)  Goal: *Acute Goals and Plan of Care (Insert Text)  Description  GOALS (1-4 days):  (1.)Mr. Aman Moreno will move from supine to sit and sit to supine  in bed with MODIFIED INDEPENDENCE. (2.)Mr. Aman Moreno will transfer from bed to chair and chair to bed with SUPERVISION using the least restrictive device. (3.)Mr. Aman Moreno will ambulate with SUPERVISION for 200 feet with the least restrictive device. (4.)Mr. Aman Moreno will ambulate up/down 1 steps with left railing with SUPERVISION with no device. (5.)Mr. Aman Moreno will increase left knee ROM to 5°-80°.  ________________________________________________________________________________________________   Outcome: Progressing Towards Goal     PHYSICAL THERAPY JOINT CAMP TKA: Initial Assessment, Treatment Day: Day of Assessment and PM 5/31/2019  INPATIENT: Hospital Day: 1  [place] / [place] / Product Type: Medicare /      NAME/AGE/GENDER: Kingsley Quintero is a 75 y.o. male   PRIMARY DIAGNOSIS:  Primary osteoarthritis of left knee [M17.12]   Procedure(s) and Anesthesia Type:     * LEFT TOTAL KNEE ARTHROPLASTY SPINAL/FNB CHRISTOPHER - Spinal (Left)  ICD-10: Treatment Diagnosis:    Pain in Left Knee (M25.562)  Stiffness of Left Knee, Not elsewhere classified (M25.662)  Difficulty in walking, Not elsewhere classified (R26.2)      ASSESSMENT:     Mr. Aman Moreno presents with impaired strength & mobility s/p left TKA. Pt also had decreased stability during out of bed activity. This pt will benefit from follow up therapy to help restore safe function prior to returning home with caregiver.     This section established at most recent assessment   PROBLEM LIST (Impairments causing functional limitations):  Decreased Strength  Decreased ADL/Functional Activities  Decreased Transfer Abilities  Decreased Ambulation Ability/Technique  Decreased Balance  Increased Pain  Decreased Activity Tolerance  Decreased Flexibility/Joint Mobility  Decreased Imogene with Home Exercise Program   INTERVENTIONS PLANNED: (Benefits and precautions of physical therapy have been discussed with the patient.)  bed mobility  gait training  home exercise program (HEP)  Range of Motion: active/assisted/passive  Therapeutic Activities  therapeutic exercise/strengthening  transfer training  Group Therapy     TREATMENT PLAN: Frequency/Duration: Follow patient BID for duration of hospital stay to address above goals. Rehabilitation Potential For Stated Goals: Good     RECOMMENDED REHABILITATION/EQUIPMENT: (at time of discharge pending progress): Continue Skilled Therapy and Home Health: Physical Therapy. HISTORY:   History of Present Injury/Illness (Reason for Referral):  Left TKA  Past Medical History/Comorbidities:   Mr. 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 Mr. Swapnil Sosa  has a past surgical history that includes hx tonsillectomy; hx other surgical (2002); hx hip replacement (Right, 10/2011); hx other surgical; hx back surgery; and hx knee replacement (Right, 03/06/2019).   Social History/Living Environment:   Home Environment: Private residence  # Steps to Enter: 1  Rails to Enter: No  One/Two Story Residence: One story  Living Alone: No  Support Systems: Spouse/Significant Other/Partner  Patient Expects to be Discharged to[de-identified] Skilled nursing facility  Current DME Used/Available at Home: Commode, bedside, Walker, rolling  Prior Level of Function/Work/Activity:  Pt was independent with a cane prior to this admission. Number of Personal Factors/Comorbidities that affect the Plan of Care: 3+: HIGH COMPLEXITY   EXAMINATION:   Most Recent Physical Functioning:   Gross Assessment: Yes  Gross Assessment  AROM: Generally decreased, functional(right LE)  Strength: Generally decreased, functional(right LE)  Coordination: Generally decreased, functional(right LE)                     Bed Mobility  Supine to Sit: Contact guard assistance  Sit to Supine: Contact guard assistance  Scooting: Contact guard assistance    Transfers  Sit to Stand: Contact guard assistance  Stand to Sit: Contact guard assistance  Bed to Chair: Contact guard assistance(with walker)    Balance  Sitting: Intact; Without support  Standing: Impaired; With support(walker)              Weight Bearing Status  Left Side Weight Bearing: As tolerated  Distance (ft): 100 Feet (ft)  Ambulation - Level of Assistance: Contact guard assistance  Assistive Device: Walker, rolling  Speed/Jennie: Delayed  Step Length: Right shortened  Stance: Left decreased  Gait Abnormalities: Antalgic;Decreased step clearance        Braces/Orthotics: none    Left Knee Cold  Type: Cryocuff      Body Structures Involved:  Joints  Muscles Body Functions Affected:  Sensory/Pain  Movement Related Activities and Participation Affected:  General Tasks and Demands  Mobility   Number of elements that affect the Plan of Care: 4+: HIGH COMPLEXITY   CLINICAL PRESENTATION:   Presentation: Evolving clinical presentation with unstable and unpredictable characteristics: HIGH COMPLEXITY   CLINICAL DECISION MAKIN [address]? ?6 Clicks? Basic Mobility Inpatient Short Form  How much difficulty does the patient currently have. .. Unable A Lot A Little None   1. Turning over in bed (including adjusting bedclothes, sheets and blankets)? ? 1   ? 2   ? 3   ? 4   2. Sitting down on and standing up from a chair with arms ( e.g., wheelchair, bedside commode, etc.)   ? 1   ? 2   ? 3   ? 4   3. Moving from lying on back to sitting on the side of the bed?   ? 1   ? 2   ? 3   ? 4   How much help from another person does the patient currently need. .. Total A Lot A Little None   4. Moving to and from a bed to a chair (including a wheelchair)? ? 1   ? 2   ? 3   ? 4   5. Need to walk in hospital room? ? 1   ? 2   ? 3   ? 4   6. Climbing 3-5 steps with a railing? ? 1   ? 2   ? 3   ? 4   © 2007, Trustees of [address] [place] [address] [address] [address], under license to [employer]. All rights reserved     Score:  Initial: 16 Most Recent: X (Date: -- )    Interpretation of Tool:  Represents activities that are increasingly more difficult (i.e. Bed mobility, Transfers, Gait). Medical Necessity:     Patient is expected to demonstrate progress in strength, range of motion, balance, coordination and functional technique   to decrease assistance required with bed mobility, transfers & gait   . Reason for Services/Other Comments:  Patient continues to require skilled intervention due to pt not safe with functional mobility   . Use of outcome tool(s) and clinical judgement create a POC that gives a: Clear prediction of patient's progress: LOW COMPLEXITY            TREATMENT:   (In addition to Assessment/Re-Assessment sessions the following treatments were rendered)     Pre-treatment Symptoms/Complaints:  none  Pain: Initial: numeric scale  Pain Intensity 1: 3  Pain Location 1: Knee  Pain Orientation 1: Left  Pain Intervention(s) 1: Ambulation/Increased Activity, Cold pack  Post Session:  3/10     Therapeutic Exercise: (15 Minutes):  Exercises per grid below to improve mobility and dynamic movement of leg - left to improve functional endurance . Required minimal verbal cues to promote proper body alignment and promote proper body mechanics. Progressed range and repetitions as indicated. Assessment/ 15 min     Date:  5/31 Date:   Date:     ACTIVITY/EXERCISE AM PM AM PM AM PM   GROUP THERAPY  ?  ?  ?  ?  ?  ?    Ankle Pumps  10 Quad Sets  10       Gluteal Sets  10       Hip ABd/ADduction  10       Straight Leg Raises  10       Knee Slides  10       Short Arc Quads  10       Long Arc Quads         Chair Slides                  B = bilateral; AA = active assistive; A = active; P = passive      Treatment/Session Assessment:     Response to Treatment:  tolerated well    Education:  ? Home Exercises  ? Fall Precautions  ? Hip Precautions ? D/C Instruction Review  ? Knee/Hip Prosthesis Review  ? Walker Management/Safety ? Adaptive Equipment as Needed       Interdisciplinary Collaboration:   Registered Nurse    After treatment position/precautions:   Up in chair  Bed/Chair-wheels locked  Caregiver at bedside  Call light within reach  RN notified  Family at bedside    Compliance with Program/Exercises: Will assess as treatment progresses. Recommendations/Intent for next treatment session:  Treatment next visit will focus on increasing Mr. Yuan's independence with bed mobility, transfers, gait training, strength/ROM exercises, modalities for pain, and patient education.       Total Treatment Duration:  PT Patient Time In/Time Out  Time In: 1734  Time Out: [facility],

## 2019-05-31 NOTE — PROGRESS NOTES
I discussed the pt's development of intraop Mobitz 1 with Dr. Dana Foley (Cardiology). He felt it was just high vagal tone and does not need any intervention. He is now back in NSR on 12 lead ECG in PACU. Will put him on telemetry to monitor for any worsening of AV block.

## 2019-05-31 NOTE — PROGRESS NOTES
Care Management Interventions  Mode of Transport at Discharge: Self  Transition of Care Consult (CM Consult): SNF  Partner SNF: Yes  Physical Therapy Consult: Yes  Current Support Network: Own Home  Plan discussed with Pt/Family/Caregiver: Yes  Freedom of Choice Offered: Yes  Discharge Location  Discharge Placement: Skilled nursing facility    Patient is a 75 yr old male admitted for a TKA. He has requested rehab at [facility] for rehab. Referral sent and approval obtained for admission on Monday 6-3 after his required 3 night stay. Patient aware that the only bed available is a semi-pvt room. Will follow.  Roland Jarvis

## 2019-05-31 NOTE — OP NOTES
[facility]  Total Knee Arthroplasty  Patient:Everette Chaparro   : 1945  Medical Record Number:[id number]  Pre-operative Diagnosis:  Primary osteoarthritis of left knee [M17.12]  Post-operative Diagnosis: Primary osteoarthritis of left knee [M17.12]  Location: [address] [place] [address]  Surgeon: Su Abdalla MD  Assistant: Gonzalo Miller    Anesthesia: Spinal and FNB    Procedure: Procedure(s) (LRB):  LEFT TOTAL KNEE ARTHROPLASTY SPINAL/FNB CHRISTOPHER (Left)    The complexity of the total joint surgery requires the use of a first assistant for positioning, retraction and expertise in closure. Tourniquet Time: 0 minutes  EBL: 250cc  Findings: severe degenerative arthritis, patellar osteophytes, posterior femoral osteophytes   BMI: Body mass index is 30.46 kg/m². Jose Francisco Viramontes was brought to the operating room and positioned on the operating table. He was anethestized with anesthesia. A pires catheter was placed preoperatively and IV antibiotics was administered. Prior to the incision being made a timeout was called identifying the patient, procedure ,operative side and surgeon. .The operative leg was prepped and draped in the usual sterile manner. An anterior longitudinal incision was accomplished just medial to the tibial tubercle and extending approximal 6 centimeters proximal to the superior pole of the patella. A medial parapatellar capsular incision was performed. The medial capsular flap was elevated around to the insertion of the semimembranous tendon. The patella was everted and the knee flexed and externally rotated. The medial and external menisci were excised. The lateral half of the fat pad excised and the patella femoral ligament was released. The anterior cruciate ligament was resected and the posterior cruciate ligament was substituted. Using extramedullary instrumentation, the tibial cut was accomplished with appropriate posterior slope.   Approxiamately 9mm of bone was removed from the high side of the tibia. The distal femur was next addressed. A drill hole was made above the intracondolar notch. Using appropriate intramedullary instrumentation,a five degree valgus distal cut was accomplished. The femur was sized. The anterior and posterior cuts were then made about the distal femur. The osteophytes were removed from the tibial and femoral surfaces. The flexion and extension gaps were assessed with the appropriate spacer blocks. Additional surgical procedures included: none. The flexion and extension gaps were deemed appropriately balanced. The appropriate cutting blocks were then utilized to perform the anterior chamfer, posterior chamfer and notch cuts, with appropriate lateral tranlation accomplished for the patellofemoral groove. The tibia baseplate was sized. The tibial base plate was pinned into place with the appropriate external rotation and stem site prepared. A preliminary range of motion was accomplished with  trial components. The patient was able to obtain full extension as well as appropriate flexion. The patient's ligaments were stable in flexion and extension to medial and lateral stressing and the alignment was through the appropriate mechanical axis. The patella was then everted. The bone was resected to accomodate the three peg  patella button. A trial reduction revealed appropriate tracking through the patellofemoral groove with no lateral retinacular release being accomplished. All trial components were removed. The knee was irrigated. There were no femoral deficiencies. There were no tibial deficiencies. No augmentation was utilized. Two packages of cement were mixed and the permanent Tibial and Femoral components were cemented into place. The patella component was then  cemented in place.     Gilma Pichardo knee was placed through range of motion and noted to be stable as mentioned above with the trail components. The wound was dry, therefore no drain was used. The operative knee was injected with 60cc of Naropin, 10 cc's of morphine and 1 cc of 30mg of Toradol. The knee was then soaked with a diluted betadine solution for approximately 3 min. This was then thoroughly irrigated. The capsular layer was closed using a #1 PDS suture. The knee joint was then injected with transexamic acid. The subcutaneous layers were closed using 2-0 Stratafix suture. Finally the skin was closed using 3-0 Vicryl and skin staples, which were applied in occlusive fashion and sterile bandage applied. An Iceman cryo pad was applied on the operative leg. Sponge count and needle counts were correct. Shabbir Diaz left the operating room     Implants:   Implant Name Type Inv.  Item Serial No.  Lot No. LRB No. Used   COMPNT FEM PS TRIATHLN 7 L PA --  - [id number]  COMPNT FEM PS TRIATHLN 7 L PA --  [id number] [employer] HOW [id number] Left 1   BASEPLT TIB PC TRITNM SZ 7 -- TRIATHLON - [id number]  BASEPLT TIB PC TRITNM SZ 7 -- TRIATHLON [id number] [employer] HOW [id number] Left 1   PAT ASYM MTL-BK 11MM SZ A38 -- TRIATHLON - [id number]  PAT ASYM MTL-BK 11MM SZ A38 -- TRIATHLON HPDP [employer] HOW HPDP Left 1   CEMENT BNE HV R 40GM -- PALACOS R [id number] - [id number]  CEMENT BNE HV R 40GM -- PALACOS R [id number] [facility] [id number] Left 1   INSERT TIB PS TRIATHLN 7 9MM --  - [id number]  INSERT TIB PS TRIATHLN 7 9MM --  [id number] [employer] HOW [id number] Left 1   Tibial bearing insert   [id number] [employer] [id number] Left 1           Signed By: Noman Roberts MD  5/31/2019,  2:05 PM

## 2019-05-31 NOTE — PROGRESS NOTES
05/31/19 1515   Oxygen Therapy   O2 Sat (%) 97 %   Pulse via Oximetry 91 beats per minute   O2 Device Room air   Incentive Spirometry Treatment   Actual Volume (ml) 3500 ml   Number of Attempts 1   Joint Camp Notes Reviewed. Pt working on IS. Pt encouraged to do 10 breaths per hour while awake on IS. Good NPC. No respiratory distress noted at this time. No complications noted at this time.  C/s BARRY 08 at bedside

## 2019-05-31 NOTE — ANESTHESIA PREPROCEDURE EVALUATION
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, gait instability Cardiovascular    Hypertension: well controlled              Exercise tolerance: <4 METS: Uses cane due to arthritis and CMT (gait instability)     GI/Hepatic/Renal  Within defined limits             Comments: Increased Cr on recent lab Endo/Other        Arthritis    Comments: Increased glc on random labs Other Findings            Physical Exam    Airway  Mallampati: II  TM Distance: 4 - 6 cm  Neck ROM: normal range of motion   Mouth opening: Normal     Cardiovascular    Rhythm: irregular  Rate: normal        Comments: occ PVCs on monitor Dental    Dentition: Caps/crowns     Pulmonary                 Abdominal  GI exam deferred       Other Findings            Anesthetic Plan    ASA: 3  Anesthesia type: spinal - femoral single shot      Post-op pain plan if not by surgeon: peripheral nerve block single    Induction: Intravenous  Anesthetic plan and risks discussed with: Patient

## 2019-05-31 NOTE — PROGRESS NOTES
TRANSFER - IN REPORT:    Verbal report received from Alessandra Bingham RN on Herlinda Hodgkin  being received from PACU for routine post - op      Report consisted of patients Situation, Background, Assessment and   Recommendations(SBAR). Information from the following report(s) SBAR, Kardex, OR Summary, Procedure Summary, Intake/Output, MAR, Accordion and Recent Results was reviewed with the receiving nurse. Opportunity for questions and clarification was provided. Assessment completed upon patients arrival to unit and care assumed.

## 2019-05-31 NOTE — PERIOP NOTES
TRANSFER - OUT REPORT:    Verbal report given to receiving nurse(name) on Deena Olivera being transferred to [facility](unit) for routine progression of care       Report consisted of patient's Situation, Background, Assessment and   Recommendations(SBAR). Information from the following report(s) OR Summary, Procedure Summary, Intake/Output and MAR was reviewed with the receiving nurse. Opportunity for questions and clarification was provided.       Patient transported with:   [employer]

## 2019-05-31 NOTE — PROGRESS NOTES
Problem: Self Care Deficits Care Plan (Adult)  Goal: *Acute Goals and Plan of Care (Insert Text)  Description  GOALS:   DISCHARGE GOALS (in preparation for going home/rehab):  3 days  1. Mr. ARIEL ROLLE will perform one lower body dressing activity with minimal assistance required to demonstrate improved functional mobility and safety. 2.  Mr. GEORGE [facility] will perform one lower body bathing activity with minimal assistance required to demonstrate improved functional mobility and safety. 3.   [facility] will perform toileting/toilet transfer with contact guard assistance to demonstrate improved functional mobility and safety. 4.   [facility] will perform shower transfer with contact guard assistance to demonstrate improved functional mobility and safety. Outcome: Progressing Towards Goal     JOINT CAMP OCCUPATIONAL THERAPY TKA: Initial Assessment, Daily Note, Treatment Day: Day of Assessment and PM 5/31/2019  INPATIENT: Hospital Day: 1  [place] / Product Type: Medicare /      NAME/AGE/GENDER: Miranda Hodgkin is a 75 y.o. male   PRIMARY DIAGNOSIS:  Primary osteoarthritis of left knee [M17.12]   Procedure(s) and Anesthesia Type:     * LEFT TOTAL KNEE ARTHROPLASTY SPINAL/FNB CHRISTOPHER - Spinal (Left)  ICD-10: Treatment Diagnosis:    Pain in Left Knee (M25.562)  Stiffness of Left Knee, Not elsewhere classified (G38.316)      ASSESSMENT:     Mr. ARIEL ROLLE is s/p left TKA and presents with decreased weight bearing on left LE and decreased independence with functional mobility and activities of daily living as compared to baseline level of function and safety. Patient would benefit from skilled Occupational Therapy to maximize independence and safety with self-care task and functional mobility. Pt would also benefit from education on adaptive equipment and safety precautions in preparation for going home. Pt up to edge of bed and donned underpants and shorts as noted below. Pt will progress well with self care. This section established at most recent assessment   PROBLEM LIST (Impairments causing functional limitations):  Decreased Strength  Decreased ADL/Functional Activities  Decreased Transfer Abilities  Increased Pain  Increased Fatigue  Decreased Flexibility/Joint Mobility  Decreased Knowledge of Precautions   INTERVENTIONS PLANNED: (Benefits and precautions of occupational therapy have been discussed with the patient.)  Activities of daily living training  Adaptive equipment training  Balance training  Clothing management  Donning&doffing training  Theraputic activity     TREATMENT PLAN: Frequency/Duration: Follow patient 1-2 times to address above goals. Rehabilitation Potential For Stated Goals: Good     RECOMMENDED REHABILITATION/EQUIPMENT: (at time of discharge pending progress): Continue Skilled Therapy and Home Health: Physical Therapy. OCCUPATIONAL PROFILE AND HISTORY:   History of Present Injury/Illness (Reason for Referral): Pt presents this date s/p (left) TKA. Past Medical History/Comorbidities:   Mr. 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 Mr. Julianna Montoya  has a past surgical history that includes hx tonsillectomy; hx other surgical (2002); hx hip replacement (Right, 10/2011); hx other surgical; hx back surgery; and hx knee replacement (Right, 03/06/2019).   Social History/Living Environment:   Home Environment: Private residence  One/Two Story Residence: One story  Living Alone: No  Support Systems: Spouse/Significant Other/Partner  Patient Expects to be Discharged to[de-identified] Private residence  Current DME Used/Available at Home: Cristian Suarez, mickey, Commode, bedside, Walker  Prior Level of Function/Work/Activity:  Independent      Number of Personal Factors/Comorbidities that affect the Plan of Care: Brief history (0):  LOW COMPLEXITY   ASSESSMENT OF OCCUPATIONAL PERFORMANCE[de-identified]   Most Recent Physical Functioning:   Balance  Sitting: Intact  Standing: Pull to stand; With support                    Coordination  Fine Motor Skills-Upper: Left Intact; Right Intact  Gross Motor Skills-Upper: Left Intact; Right Intact         Mental Status  Neurologic State: Alert  Orientation Level: Oriented X4  Cognition: Appropriate decision making  Perception: Appears intact  Perseveration: No perseveration noted  Safety/Judgement: Awareness of environment                Basic ADLs (From Assessment) Complex ADLs (From Assessment)   Basic ADL  Feeding: Independent  Oral Facial Hygiene/Grooming: Supervision  Bathing: Moderate assistance  Upper Body Dressing: Supervision  Lower Body Dressing: Moderate assistance  Toileting: Moderate assistance     Grooming/Bathing/Dressing Activities of Daily Living     Cognitive Retraining  Safety/Judgement: Awareness of environment                 Functional Transfers  Bathroom Mobility: Moderate assistance  Toilet Transfer : Moderate assistance  Shower Transfer: Moderate assistance     Bed/Mat Mobility  Supine to Sit: Minimum assistance  Sit to Stand: Moderate assistance  Stand to Sit: Minimum assistance  Bed to Chair: Minimum assistance  Scooting: Contact guard assistance         Physical Skills Involved:  Range of Motion  Balance  Strength Cognitive Skills Affected (resulting in the inability to perform in a timely and safe manner):  none  Psychosocial Skills Affected:  Environmental Adaptation   Number of elements that affect the Plan of Care: 3-5:  MODERATE COMPLEXITY   CLINICAL DECISION MAKIN [address]? ?6 Clicks? Daily Activity Inpatient Short Form  How much help from another person does the patient currently need. .. Total A Lot A Little None   1. Putting on and taking off regular lower body clothing? ? 1   ? 2   ? 3   ? 4   2. Bathing (including washing, rinsing, drying)? ? 1   ? 2   ? 3   ? 4   3. Toileting, which includes using toilet, bedpan or urinal?   ? 1   ? 2   ? 3   ? 4   4. Putting on and taking off regular upper body clothing? ? 1   ? 2   ? 3   ? 4   5. Taking care of personal grooming such as brushing teeth? ? 1   ? 2   ? 3   ? 4   6. Eating meals? ? 1   ? 2   ? 3   ? 4   © 2007, Trustees of [address] [place] [address] [address] [id number], under license to [employer]. All rights reserved     Score:  Initial: 19 Most Recent: X (Date: -- )    Interpretation of Tool:  Represents activities that are increasingly more difficult (i.e. Bed mobility, Transfers, Gait). Medical Necessity:     Patient is expected to demonstrate progress in range of motion, balance and functional technique   to increase independence with self care   . Reason for Services/Other Comments:  Patient would benefit from skilled Occupational Therapy to maximize independence and safety with self-care task and functional mobility. Use of outcome tool(s) and clinical judgement create a POC that gives a: LOW COMPLEXITY            TREATMENT:   (In addition to Assessment/Re-Assessment sessions the following treatments were rendered)     Pre-treatment Symptoms/Complaints:  pt with complaint of pain RN present and gave medication  Pain: Initial:   Pain Intensity 1: 4 4 Post Session:  4     Self Care: (10 min): Procedure(s) (per grid) utilized to improve and/or restore self-care/home management as related to dressing. Required minimal verbal and   cueing to facilitate activities of daily living skills and compensatory activities. Treatment/Session Assessment:     Response to Treatment:  pt up to edge and bed and worked on lower body dressing. Education:  ? Home Exercises  ? Fall Precautions  ? Hip Precautions ? Going Home Video  ? Knee/Hip Prosthesis Review  ? Walker Management/Safety ? Adaptive Equipment as Needed       Interdisciplinary Collaboration:   Physical Therapist  Occupational Therapist  Registered Nurse    After treatment position/precautions:   Up in chair  Bed/Chair-wheels locked  Call light within reach  RN notified  Family at bedside     Compliance with Program/Exercises: Compliant all of the time, Will assess as treatment progresses. Recommendations/Intent for next treatment session:  Treatment next visit will focus on increasing Mr. Yuan's independence with bed mobility, transfers, self care, functional mobility, modalities for pain, and patient education.       Total Treatment Duration:  OT Patient Time In/Time Out  Time In: 1430  Time Out: Vineet Bah [address] Husbands

## 2019-05-31 NOTE — DISCHARGE INSTRUCTIONS
[facility]   Patient Discharge Instructions    Amira Gallegos / [id number] : 1945    Admitted 2019 Discharged: 2019     IF YOU HAVE ANY PROBLEMS ONCE YOU ARE AT HOME CALL THE FOLLOWING NUMBERS:   Main office number: [phone]      Medications    · The medications you are to continue on are listed on the medication reconciliation sheet. · Narcotic pain medications as well as supplemental iron can cause constipation. If this occurs try stopping the narcotic pain medication and/or the iron. · It is important that you take the medication exactly as they are prescribed. · Medications which increase your risk of blood clots are listed to stop for 5 weeks after surgery as well as medications or supplements which increase your risk of bleeding complications. · Keep your medication in the bottles provided by the pharmacist and keep a list of the medication names, dosages, and times to be taken in your wallet. · Do not take other medications without consulting your doctor. Important Information    Do NOT smoke as this will greatly increase your risk of infection! Resume your prehospital diet. If you have excessive nausea or vomitting call your doctor's office     Leg swelling and warmth is normal for 6 months after surgery. If you experience swelling in your leg elevate you leg while laying down with your toes above your heart. If you have sudden onset severe swelling with leg pain call our office. Use Justin Hose stockings until we see you in the office for your follow up appointment. The stitches deep inside take approximately 6 months to dissolve. There will be sharp shooting, stinging and burning pain. This is normal and will resolve between 3-6 months after surgery. Difficulty sleeping is normal following total Knee and Hip replacement. You may try melatonin, an over-the-counter sleep aid or benadryl to help with sleep.  Most patients will resume sleeping through the night 8 weeks after surgery. Home Physical Therapy is arranged. Home Health will contact you within 48 hrs of discharge that you have chosen. If you have not received a call within this time frame please contact that provider you chose. You should be given this information before you leave the hospital.     You are at a risk for falls. Use the rolling walker when walking. Patients who have had a joint replacement should not drive if they are still taking narcotic pain mediation during the daytime hours. Most patients wean themselves off of pain medication within 2-5 weeks after surgery. When to Call the office    - If you have a temperature greater then 101  - Uncontrolled vomiting   - Loose control of your bladder or bowel function  - Are unable to bear any wieght   - Need a pain medication refill     Information obtained by :  I understand that if any problems occur once I am at home I am to contact my physician. I understand and acknowledge receipt of the instructions indicated above.                                                                                                                                            Physician's or R.N.'s Signature                                                                  Date/Time                                                                                                                                              Patient or Representative Signature                                                          Date/Time

## 2019-06-01 LAB
ANION GAP SERPL CALC-SCNC: 9 MMOL/L (ref 7–16)
BUN SERPL-MCNC: 24 MG/DL (ref 8–23)
CALCIUM SERPL-MCNC: 7.8 MG/DL (ref 8.3–10.4)
CHLORIDE SERPL-SCNC: 108 MMOL/L (ref 98–107)
CO2 SERPL-SCNC: 25 MMOL/L (ref 21–32)
CREAT SERPL-MCNC: 1.03 MG/DL (ref 0.8–1.5)
GLUCOSE SERPL-MCNC: 147 MG/DL (ref 65–100)
HGB BLD-MCNC: 10.6 G/DL (ref 13.6–17.2)
MM INDURATION POC: 0 MM (ref 0–5)
POTASSIUM SERPL-SCNC: 3.9 MMOL/L (ref 3.5–5.1)
PPD POC: NEGATIVE NEGATIVE
SODIUM SERPL-SCNC: 142 MMOL/L (ref 136–145)

## 2019-06-01 PROCEDURE — 65270000029 HC RM PRIVATE

## 2019-06-01 PROCEDURE — 80048 BASIC METABOLIC PNL TOTAL CA: CPT

## 2019-06-01 PROCEDURE — 97116 GAIT TRAINING THERAPY: CPT

## 2019-06-01 PROCEDURE — 97535 SELF CARE MNGMENT TRAINING: CPT

## 2019-06-01 PROCEDURE — 94760 N-INVAS EAR/PLS OXIMETRY 1: CPT

## 2019-06-01 PROCEDURE — 97110 THERAPEUTIC EXERCISES: CPT

## 2019-06-01 PROCEDURE — 97150 GROUP THERAPEUTIC PROCEDURES: CPT

## 2019-06-01 PROCEDURE — 74011250637 HC RX REV CODE- 250/637: Performed by: PHYSICIAN ASSISTANT

## 2019-06-01 PROCEDURE — 85018 HEMOGLOBIN: CPT

## 2019-06-01 PROCEDURE — 36415 COLL VENOUS BLD VENIPUNCTURE: CPT

## 2019-06-01 RX ADMIN — Medication 1 AMPULE: at 21:56

## 2019-06-01 RX ADMIN — ASPIRIN 81 MG: 81 TABLET ORAL at 09:11

## 2019-06-01 RX ADMIN — FINASTERIDE 5 MG: 5 TABLET, FILM COATED ORAL at 21:57

## 2019-06-01 RX ADMIN — HYDROMORPHONE HYDROCHLORIDE 2 MG: 2 TABLET ORAL at 18:05

## 2019-06-01 RX ADMIN — HYDROMORPHONE HYDROCHLORIDE 2 MG: 2 TABLET ORAL at 09:16

## 2019-06-01 RX ADMIN — ACETAMINOPHEN 1000 MG: 500 TABLET, FILM COATED ORAL at 00:59

## 2019-06-01 RX ADMIN — HYDROMORPHONE HYDROCHLORIDE 2 MG: 2 TABLET ORAL at 00:59

## 2019-06-01 RX ADMIN — DIPHENHYDRAMINE HYDROCHLORIDE 25 MG: 25 CAPSULE ORAL at 01:02

## 2019-06-01 RX ADMIN — HYDROCHLOROTHIAZIDE 25 MG: 25 TABLET ORAL at 09:12

## 2019-06-01 RX ADMIN — HYDROMORPHONE HYDROCHLORIDE 2 MG: 2 TABLET ORAL at 05:02

## 2019-06-01 RX ADMIN — ASPIRIN 81 MG: 81 TABLET ORAL at 21:57

## 2019-06-01 RX ADMIN — ACETAMINOPHEN 1000 MG: 500 TABLET, FILM COATED ORAL at 05:02

## 2019-06-01 RX ADMIN — TAMSULOSIN HYDROCHLORIDE 0.4 MG: 0.4 CAPSULE ORAL at 09:11

## 2019-06-01 RX ADMIN — Medication 1 AMPULE: at 09:12

## 2019-06-01 RX ADMIN — HYDROMORPHONE HYDROCHLORIDE 2 MG: 2 TABLET ORAL at 21:57

## 2019-06-01 RX ADMIN — ACETAMINOPHEN 1000 MG: 500 TABLET, FILM COATED ORAL at 18:05

## 2019-06-01 RX ADMIN — HYDROMORPHONE HYDROCHLORIDE 2 MG: 2 TABLET ORAL at 14:18

## 2019-06-01 RX ADMIN — CELECOXIB 200 MG: 200 CAPSULE ORAL at 21:57

## 2019-06-01 RX ADMIN — ACETAMINOPHEN 1000 MG: 500 TABLET, FILM COATED ORAL at 14:19

## 2019-06-01 RX ADMIN — CELECOXIB 200 MG: 200 CAPSULE ORAL at 09:11

## 2019-06-01 RX ADMIN — DOCUSATE SODIUM 100 MG: 100 CAPSULE, LIQUID FILLED ORAL at 09:12

## 2019-06-01 RX ADMIN — TERAZOSIN HYDROCHLORIDE 10 MG: 5 CAPSULE ORAL at 21:57

## 2019-06-01 NOTE — PROGRESS NOTES
Problem: Self Care Deficits Care Plan (Adult)  Goal: *Acute Goals and Plan of Care (Insert Text)  Description  GOALS:   DISCHARGE GOALS (in preparation for going home/rehab):  3 days  1. Mr. Amadou Bush will perform one lower body dressing activity with minimal assistance required to demonstrate improved functional mobility and safety. GOAL MET 6/1/2019  2. Mr. Amadou Bush will perform one lower body bathing activity with minimal assistance required to demonstrate improved functional mobility and safety. GOAL MET 6/1/2019  3. Mr. Amadou Bush will perform toileting/toilet transfer with contact guard assistance to demonstrate improved functional mobility and safety. GOAL MET 6/1/2019  4. Mr. Amadou Bush will perform shower transfer with contact guard assistance to demonstrate improved functional mobility and safety. GOAL MET 6/1/2019  Outcome: Progressing Towards Goal     JOINT CAMP OCCUPATIONAL THERAPY TKA: Daily Note, Discharge, Treatment Day: 1st and AM 6/1/2019  INPATIENT: Hospital Day: 2  [place] / Product Type: Medicare /      NAME/AGE/GENDER: Stanton Dove is a 75 y.o. male   PRIMARY DIAGNOSIS:  Primary osteoarthritis of left knee [M17.12]   Procedure(s) and Anesthesia Type:     * LEFT TOTAL KNEE ARTHROPLASTY SPINAL/FNB CHRISTOPHER - Spinal (Left)  ICD-10: Treatment Diagnosis:    · Pain in Left Knee (M25.562)  · Stiffness of Left Knee, Not elsewhere classified (D22.852)      ASSESSMENT:      Mr. Amadou Bush is s/p L TKA and presents with decreased weight bearing on L LE and decreased independence with functional mobility and activities of daily living. Patient completed shower and dressing as charter below in ADL grid and is ambulating with rolling walker with supervision. Patient has met 4/4 goals and plans to return home with good family support. Family able to provide patient with appropriate level of assistance at this time.   OT reviewed safety precautions throughout session and therapy schedule for the remainder of today. Patient instructed to call for assistance when needing to get up from recliner and all needs in reach. Patient verbalized understanding of call light. This section established at most recent assessment   PROBLEM LIST (Impairments causing functional limitations):  1. Decreased Strength  2. Decreased ADL/Functional Activities  3. Decreased Transfer Abilities  4. Increased Pain  5. Increased Fatigue  6. Decreased Flexibility/Joint Mobility  7. Decreased Knowledge of Precautions   INTERVENTIONS PLANNED: (Benefits and precautions of occupational therapy have been discussed with the patient.)  1. Activities of daily living training  2. Adaptive equipment training  3. Balance training  4. Clothing management  5. Donning&doffing training  6. Theraputic activity     TREATMENT PLAN: Frequency/Duration: Follow patient 1-2 times to address above goals. Rehabilitation Potential For Stated Goals: Good     RECOMMENDED REHABILITATION/EQUIPMENT: (at time of discharge pending progress): Continue Skilled Therapy and Home Health: Physical Therapy. OCCUPATIONAL PROFILE AND HISTORY:   History of Present Injury/Illness (Reason for Referral): Pt presents this date s/p (left) TKA. Past Medical History/Comorbidities:   Mr. 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   Mr. Shekhar Forbes  has a past surgical history that includes hx tonsillectomy; hx other surgical (2002); hx hip replacement (Right, 10/2011); hx other surgical; hx back surgery; and hx knee replacement (Right, 03/06/2019). Social History/Living Environment:   Home Environment: Private residence  # Steps to Enter: 1  Rails to Enter: No  One/Two Story Residence: One story  Living Alone: No  Support Systems: Spouse/Significant Other/Partner  Patient Expects to be Discharged to[de-identified] Skilled nursing facility  Current DME Used/Available at Home: Commode, bedside, Walker, rolling  Prior Level of Function/Work/Activity:  Independent      Number of Personal Factors/Comorbidities that affect the Plan of Care: Brief history (0):  LOW COMPLEXITY   ASSESSMENT OF OCCUPATIONAL PERFORMANCE[de-identified]   Most Recent Physical Functioning:   Balance  Sitting: Intact  Standing: With support                  LLE Assessment  LLE Assessment (WDL): Exception to WDL  LLE PROM  L Knee Flexion: 55(~)  L Knee Extension: -15(~)           Mental Status  Neurologic State: Alert  Orientation Level: Oriented X4          RLE Assessment  RLE Assessment (WDL): Within defined limits     Basic ADLs (From Assessment) Complex ADLs (From Assessment)   Basic ADL  Feeding: Independent  Oral Facial Hygiene/Grooming: Supervision  Bathing: Moderate assistance  Type of Bath: Chlorhexidine (CHG), Full, Shower  Upper Body Dressing: Supervision  Lower Body Dressing: Moderate assistance  Toileting:  Moderate assistance     Grooming/Bathing/Dressing Activities of Daily Living   Grooming  Washing Face: Supervision     Upper Body Bathing  Bathing Assistance: Modified independent  Position Performed: Seated in chair  Adaptive Equipment: Shower chair     Lower Body Bathing  Bathing Assistance: Modified independent  Perineal  : Independent  Lower Body : Modified independent  Adaptive Equipment: Shower chair     Upper [address] [facility]: Independent Functional Transfers  Shower Transfer: Supervision   Lower Body [facility]: Supervision  Underpants: Supervision  Pants With Elastic Waist: Supervision  Socks: Supervision  Slip on Shoes with Back: Supervision Bed/Mat Mobility  Supine to Sit: Stand-by assistance  Sit to Supine: (NT)  Sit to Stand: Supervision  Stand to Sit: Supervision  Bed to Chair: Stand-by assistance(with walker)  Scooting: Stand-by assistance         Physical Skills Involved:  1. Range of Motion  2. Balance  3. Strength Cognitive Skills Affected (resulting in the inability to perform in a timely and safe manner): 1. none  Psychosocial Skills Affected:  1. Environmental Adaptation   Number of elements that affect the Plan of Care: 3-5:  MODERATE COMPLEXITY   CLINICAL DECISION MAKING:   [facility] [employer] AM-PAC 6 Clicks   Daily Activity Inpatient Short Form  How much help from another person does the patient currently need. .. Total A Lot A Little None   1. Putting on and taking off regular lower body clothing? ? 1   ? 2   ? 3   ? 4   2. Bathing (including washing, rinsing, drying)? ? 1   ? 2   ? 3   ? 4   3. Toileting, which includes using toilet, bedpan or urinal?   ? 1   ? 2   ? 3   ? 4   4. Putting on and taking off regular upper body clothing? ? 1   ? 2   ? 3   ? 4   5. Taking care of personal grooming such as brushing teeth? ? 1   ? 2   ? 3   ? 4   6. Eating meals? ? 1   ? 2   ? 3   ? 4   © 2007, Trustees of [facility] [employer], under license to [employer]. All rights reserved     Score:  Initial: 19 Most Recent: X (Date: -- )    Interpretation of Tool:  Represents activities that are increasingly more difficult (i.e. Bed mobility, Transfers, Gait).        Use of outcome tool(s) and clinical judgement create a POC that gives a: LOW COMPLEXITY            TREATMENT:   (In addition to Assessment/Re-Assessment sessions the following treatments were rendered)     Pre-treatment Symptoms/Complaints:  pt with complaint of pain RN present and gave medication  Pain: Initial:   Pain Intensity 1: 2  Pain Location 1: Knee  Pain Intervention(s) 1: Repositioned, Shower 4 Post Session:  4   Self Care: (23): Procedure(s) (per grid) utilized to improve and/or restore self-care/home management as related to dressing and bathing. Required min  verbal and manual cueing to facilitate activities of daily living skills. Treatment/Session Assessment:     Response to Treatment:  pt up to edge and bed and worked on lower body dressing. Education:  ? Home Exercises  ? Fall Precautions  ? Hip Precautions ? Going Home Video  ? Knee/Hip Prosthesis Review  ? Walker Management/Safety ? Adaptive Equipment as Needed       Interdisciplinary Collaboration:   o Physical Therapist  o Certified Occupational Therapy Assistant  o Registered Nurse    After treatment position/precautions:   o Up in chair  o Bed/Chair-wheels locked  o Call light within reach  o RN notified  o Family at bedside     Compliance with Program/Exercises: Compliant all of the time, Will assess as treatment progresses. Pt doing well all goals met and will do well at home with support from family. Patient will be discharged home with home health PT. No further Occupational Therapy warranted, will discharge Occupational Therapy services.       Total Treatment Duration:  OT Patient Time In/Time Out  Time In: 0845  Time Out: SAWYER Arana 2

## 2019-06-01 NOTE — PROGRESS NOTES
Care Management Interventions  PCP Verified by CM:  Yes  Mode of Transport at Discharge: [address] (CM Consult): SNF  Partner SNF: Yes  Physical Therapy Consult: Yes  Current Support Network: Own Home  Plan discussed with Pt/Family/Caregiver: Yes  Freedom of Choice Offered: Yes  Discharge Location  Discharge Placement: Skilled nursing facility([facility]

## 2019-06-01 NOTE — PROGRESS NOTES
Problem: Mobility Impaired (Adult and Pediatric)  Goal: *Acute Goals and Plan of Care (Insert Text)  Description  GOALS (1-4 days):  (1.)Mr. Jessica Amezcua will move from supine to sit and sit to supine  in bed with MODIFIED INDEPENDENCE. (2.)Mr. Jessica Amezcua will transfer from bed to chair and chair to bed with SUPERVISION using the least restrictive device. (3.)Mr. Jessica Amezcua will ambulate with SUPERVISION for 200 feet with the least restrictive device. (4.)Mr. Jessica Amezcua will ambulate up/down 1 steps with left railing with SUPERVISION with no device. (5.)Mr. Jessica Amezcua will increase left knee ROM to 5°-80°.  ________________________________________________________________________________________________   Outcome: Progressing Towards Goal     PHYSICAL THERAPY JOINT CAMP TKA: Daily Note, Treatment Day: 1st and AM 6/1/2019  INPATIENT: Hospital Day: 2  [place] / Product Type: Medicare /      NAME/AGE/GENDER: Rahul Collins is a 75 y.o. male   PRIMARY DIAGNOSIS:  Primary osteoarthritis of left knee [M17.12]   Procedure(s) and Anesthesia Type:     * LEFT TOTAL KNEE ARTHROPLASTY SPINAL/FNB CHRISTOPHER - Spinal (Left)  ICD-10: Treatment Diagnosis:    · Pain in Left Knee (M25.562)  · Stiffness of Left Knee, Not elsewhere classified (M25.662)  · Difficulty in walking, Not elsewhere classified (R26.2)      ASSESSMENT:     Mr. Jessica Amezcua showed increased gait distance & improved level of assist for transfers & gait in am session. This section established at most recent assessment   PROBLEM LIST (Impairments causing functional limitations):  1. Decreased Strength  2. Decreased ADL/Functional Activities  3. Decreased Transfer Abilities  4. Decreased Ambulation Ability/Technique  5. Decreased Balance  6. Increased Pain  7. Decreased Activity Tolerance  8. Decreased Flexibility/Joint Mobility  9.  Decreased [place] with Home Exercise Program   INTERVENTIONS PLANNED: (Benefits and precautions of physical therapy have been discussed with the patient.)  1. bed mobility  2. gait training  3. home exercise program (HEP)  4. Range of Motion: active/assisted/passive  5. Therapeutic Activities  6. therapeutic exercise/strengthening  7. transfer training  8. Group Therapy     TREATMENT PLAN: Frequency/Duration: Follow patient BID for duration of hospital stay to address above goals. Rehabilitation Potential For Stated Goals: Good     RECOMMENDED REHABILITATION/EQUIPMENT: (at time of discharge pending progress): Continue Skilled Therapy and Home Health: Physical Therapy. HISTORY:   History of Present Injury/Illness (Reason for Referral):  Left TKA  Past Medical History/Comorbidities:   Mr. 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 Mr. Mg  has a past surgical history that includes hx tonsillectomy; hx other surgical (2002); hx hip replacement (Right, 10/2011); hx other surgical; hx back surgery; and hx knee replacement (Right, 03/06/2019). Social History/Living Environment:   Home Environment: Private residence  # Steps to Enter: 1  Rails to Enter: No  One/Two Story Residence: One story  Living Alone: No  Support Systems: Spouse/Significant Other/Partner  Patient Expects to be Discharged to[de-identified] Skilled nursing facility  Current DME Used/Available at Home: Commode, bedside, Walker, rolling  Prior Level of Function/Work/Activity:  Pt was independent with a cane prior to this admission.    Number of Personal Factors/Comorbidities that affect the Plan of Care: 3+: HIGH COMPLEXITY EXAMINATION:   Most Recent Physical Functioning:                  LLE PROM  L Knee Flexion: 55(~)  L Knee Extension: -15(~)         Bed Mobility  Supine to Sit: Stand-by assistance  Sit to Supine: (NT)  Scooting: Stand-by assistance    Transfers  Sit to Stand: Stand-by assistance  Stand to Sit: Stand-by assistance  Bed to Chair: Stand-by assistance(with walker)    Balance  Sitting: Intact; Without support  Standing: Impaired; With support(walker)              Weight Bearing Status  Left Side Weight Bearing: As tolerated  Distance (ft): 150 Feet (ft)  Ambulation - Level of Assistance: Stand-by assistance  Assistive Device: Walker, rolling  Speed/Jennie: Delayed  Step Length: Right shortened  Stance: Left decreased  Gait Abnormalities: Antalgic;Decreased step clearance        Braces/Orthotics: none    Left Knee Cold  Type: Cryocuff      Body Structures Involved:  1. Joints  2. Muscles Body Functions Affected:  1. Sensory/Pain  2. Movement Related Activities and Participation Affected:  1. General Tasks and Demands  2. Mobility   Number of elements that affect the Plan of Care: 4+: HIGH COMPLEXITY   CLINICAL PRESENTATION:   Presentation: Evolving clinical presentation with unstable and unpredictable characteristics: HIGH COMPLEXITY   CLINICAL DECISION MAKIN [facility] Mobility Inpatient Short Form  How much difficulty does the patient currently have. .. Unable A Lot A Little None   1. Turning over in bed (including adjusting bedclothes, sheets and blankets)? ? 1   ? 2   ? 3   ? 4   2. Sitting down on and standing up from a chair with arms ( e.g., wheelchair, bedside commode, etc.)   ? 1   ? 2   ? 3   ? 4   3. Moving from lying on back to sitting on the side of the bed?   ? 1   ? 2   ? 3   ? 4   How much help from another person does the patient currently need. .. Total A Lot A Little None   4. Moving to and from a bed to a chair (including a wheelchair)?   ? 1   ? 2   ? 3   ? 4 5.  Need to walk in hospital room? ? 1   ? 2   ? 3   ? 4   6. Climbing 3-5 steps with a railing? ? 1   ? 2   ? 3   ? 4   © 2007, Trustees of [facility] [employer], under license to [employer]. All rights reserved     Score:  Initial: 16 Most Recent: X (Date: -- )    Interpretation of Tool:  Represents activities that are increasingly more difficult (i.e. Bed mobility, Transfers, Gait). Medical Necessity:     · Patient is expected to demonstrate progress in strength, range of motion, balance, coordination and functional technique  ·  to decrease assistance required with bed mobility, transfers & gait   · .  Reason for Services/Other Comments:  · Patient continues to require skilled intervention due to pt not safe with functional mobility   · . Use of outcome tool(s) and clinical judgement create a POC that gives a: Clear prediction of patient's progress: LOW COMPLEXITY            TREATMENT:   (In addition to Assessment/Re-Assessment sessions the following treatments were rendered)     Pre-treatment Symptoms/Complaints:  Pt pleased with progress  Pain: Initial: numeric scale  Pain Intensity 1: 2  Pain Location 1: Knee  Pain Orientation 1: Left  Pain Intervention(s) 1: Ambulation/Increased Activity, Cold pack  Post Session:  2/10     Therapeutic Exercise: (12 Minutes):  Exercises per grid below to improve mobility and dynamic movement of leg - left to improve functional endurance . Required minimal verbal cues to promote proper body alignment and promote proper body mechanics. Progressed range and repetitions as indicated. Gait Training (11 Minutes):  Gait training to improve and/or restore physical functioning as related to mobility and dynamic movement of leg - left to improve functional gait.   Ambulated 150 Feet (ft) with Stand-by assistance using a Walker, rolling and minimal cues related to their stride length and heel strike to promote proper body alignment, promote proper body posture and promote proper body mechanics. Date:  5/31 Date:  6/1 Date:     ACTIVITY/EXERCISE AM PM AM PM AM PM   GROUP THERAPY  ?  ?  ?  ?  ?  ? Ankle Pumps  10 15      Quad Sets  10 15      Gluteal Sets  10 15      Hip ABd/ADduction  10 15      Straight Leg Raises  10 15      Knee Slides  10 15      Short Arc Quads  10 15      Long Arc Quads         Chair Slides                  B = bilateral; AA = active assistive; A = active; P = passive      Treatment/Session Assessment:     Response to Treatment:  No concerns    Education:  ? Home Exercises  ? Fall Precautions  ? Hip Precautions ? D/C Instruction Review  ? Knee/Hip Prosthesis Review  ? Walker Management/Safety ? Adaptive Equipment as Needed       Interdisciplinary Collaboration:   o Registered Nurse    After treatment position/precautions:   o Up in chair  o Bed/Chair-wheels locked  o Caregiver at bedside  o Call light within reach  o RN notified  o Family at bedside    Compliance with Program/Exercises: Will assess as treatment progresses. Recommendations/Intent for next treatment session:  Treatment next visit will focus on increasing Mr. Yuan's independence with bed mobility, transfers, gait training, strength/ROM exercises, modalities for pain, and patient education.       Total Treatment Duration:  PT Patient Time In/Time Out  Time In: 0916  Time Out: Saman 35, PT

## 2019-06-01 NOTE — PROGRESS NOTES
Problem: Mobility Impaired (Adult and Pediatric)  Goal: *Acute Goals and Plan of Care (Insert Text)  Description  GOALS (1-4 days):  (1.)Mr. Mg will move from supine to sit and sit to supine  in bed with MODIFIED INDEPENDENCE. (2.)Mr. Mg will transfer from bed to chair and chair to bed with SUPERVISION using the least restrictive device. (3.)Mr. Mg will ambulate with SUPERVISION for 200 feet with the least restrictive device. (4.)Mr. Mg will ambulate up/down 1 steps with left railing with SUPERVISION with no device. (5.)Mr. Mg will increase left knee ROM to 5°-80°. Met 6/1  ________________________________________________________________________________________________   Outcome: Progressing Towards Goal     PHYSICAL THERAPY JOINT CAMP TKA: Daily Note, Treatment Day: 1st and PM 6/1/2019  INPATIENT: Hospital Day: 2  [place] / Product Type: Medicare /      NAME/AGE/GENDER: Negro Burch is a 75 y.o. male   PRIMARY DIAGNOSIS:  Primary osteoarthritis of left knee [M17.12]   Procedure(s) and Anesthesia Type:     * LEFT TOTAL KNEE ARTHROPLASTY SPINAL/FNB CHRISTOPHER - Spinal (Left)  ICD-10: Treatment Diagnosis:    · Pain in Left Knee (M25.562)  · Stiffness of Left Knee, Not elsewhere classified (M25.662)  · Difficulty in walking, Not elsewhere classified (R26.2)      ASSESSMENT:     Mr. Mg showed increased gait distance & improved level of assist for transfers & gait in am session. In pm session pt continued to show steady gains with functional mobility & with tolerance to exercises. This section established at most recent assessment   PROBLEM LIST (Impairments causing functional limitations):  1. Decreased Strength  2. Decreased ADL/Functional Activities  3. Decreased Transfer Abilities  4. Decreased Ambulation Ability/Technique  5. Decreased Balance  6. Increased Pain  7. Decreased Activity Tolerance  8.  Decreased Flexibility/Joint Mobility  9. Decreased [place] with Home Exercise Program   INTERVENTIONS PLANNED: (Benefits and precautions of physical therapy have been discussed with the patient.)  1. bed mobility  2. gait training  3. home exercise program (HEP)  4. Range of Motion: active/assisted/passive  5. Therapeutic Activities  6. therapeutic exercise/strengthening  7. transfer training  8. Group Therapy     TREATMENT PLAN: Frequency/Duration: Follow patient BID for duration of hospital stay to address above goals. Rehabilitation Potential For Stated Goals: Good     RECOMMENDED REHABILITATION/EQUIPMENT: (at time of discharge pending progress): Continue Skilled Therapy and Home Health: Physical Therapy. HISTORY:   History of Present Injury/Illness (Reason for Referral):  Left TKA  Past Medical History/Comorbidities:   Mr. 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 Mr. Darby Monroe  has a past surgical history that includes hx tonsillectomy; hx other surgical (2002); hx hip replacement (Right, 10/2011); hx other surgical; hx back surgery; and hx knee replacement (Right, 03/06/2019).   Social History/Living Environment:   Home Environment: Private residence  # Steps to Enter: 1  Rails to Enter: No  One/Two Story Residence: One story  Living Alone: No  Support Systems: Spouse/Significant Other/Partner  Patient Expects to be Discharged to[de-identified] Skilled nursing facility  Current DME Used/Available at Home: Commode, bedside, Walker, rolling  Prior Level of Function/Work/Activity:  Pt was independent with a cane prior to this admission. Number of Personal Factors/Comorbidities that affect the Plan of Care: 3+: HIGH COMPLEXITY   EXAMINATION:   Most Recent Physical Functioning:                  LLE PROM  L Knee Flexion: 101  L Knee Extension: -4         Bed Mobility  Supine to Sit: (NT)  Sit to Supine: (NT)  Scooting: Stand-by assistance    Transfers  Sit to Stand: Stand-by assistance  Stand to Sit: Stand-by assistance  Bed to Chair: Stand-by assistance    Balance  Sitting: Intact; Without support  Standing: Impaired; With support(walker)              Weight Bearing Status  Left Side Weight Bearing: As tolerated  Distance (ft): 288 Feet (ft)(x 2)  Ambulation - Level of Assistance: Stand-by assistance  Assistive Device: Walker, rolling  Speed/Jennie: Delayed  Step Length: Right shortened  Stance: Left decreased  Gait Abnormalities: Antalgic;Decreased step clearance        Braces/Orthotics: none    Left Knee Cold  Type: Cryocuff      Body Structures Involved:  1. Joints  2. Muscles Body Functions Affected:  1. Sensory/Pain  2. Movement Related Activities and Participation Affected:  1. General Tasks and Demands  2. Mobility   Number of elements that affect the Plan of Care: 4+: HIGH COMPLEXITY   CLINICAL PRESENTATION:   Presentation: Evolving clinical presentation with unstable and unpredictable characteristics: HIGH COMPLEXITY   CLINICAL DECISION MAKIN [facility] Inpatient Short Form  How much difficulty does the patient currently have. .. Unable A Lot A Little None   1. Turning over in bed (including adjusting bedclothes, sheets and blankets)? ? 1   ? 2   ? 3   ? 4   2. Sitting down on and standing up from a chair with arms ( e.g., wheelchair, bedside commode, etc.)   ? 1   ? 2   ? 3   ? 4   3. Moving from lying on back to sitting on the side of the bed?   ? 1   ? 2   ? 3   ? 4   How much help from another person does the patient currently need. .. Total A Lot A Little None   4. Moving to and from a bed to a chair (including a wheelchair)? ? 1   ? 2   ? 3   ? 4   5. Need to walk in hospital room? ? 1   ? 2   ? 3   ? 4   6. Climbing 3-5 steps with a railing? ? 1   ? 2   ? 3   ? 4   © 2007, Trustees of [facility] [employer] [employer], under license to [employer]. All rights reserved     Score:  Initial: 16 Most Recent: X (Date: -- )    Interpretation of Tool:  Represents activities that are increasingly more difficult (i.e. Bed mobility, Transfers, Gait). Medical Necessity:     · Patient is expected to demonstrate progress in strength, range of motion, balance, coordination and functional technique  ·  to decrease assistance required with bed mobility, transfers & gait   · .  Reason for Services/Other Comments:  · Patient continues to require skilled intervention due to pt not safe with functional mobility   · . Use of outcome tool(s) and clinical judgement create a POC that gives a: Clear prediction of patient's progress: LOW COMPLEXITY            TREATMENT:   (In addition to Assessment/Re-Assessment sessions the following treatments were rendered)     Pre-treatment Symptoms/Complaints:  none  Pain: Initial: numeric scale  Pain Intensity 1: 2  Pain Location 1: Knee  Pain Orientation 1: Left  Pain Intervention(s) 1: Ambulation/Increased Activity, Cold pack  Post Session:  2/10     Therapeutic Exercise: (45 Minutes(group)):  Exercises per grid below to improve mobility and dynamic movement of leg - left to improve functional endurance . Required minimal verbal cues to promote proper body alignment and promote proper body mechanics. Progressed range and repetitions as indicated. Gait Training (15 Minutes):  Gait training to improve and/or restore physical functioning as related to mobility and dynamic movement of leg - left to improve functional gait.   Ambulated 288 Feet (ft)(x 2) with Stand-by assistance using a Walker, rolling and minimal cues related to their stride length and heel strike to promote proper body alignment, promote proper body posture and promote proper body mechanics. Date:  5/31 Date:  6/1 Date:     ACTIVITY/EXERCISE AM PM AM PM AM PM   GROUP THERAPY  ?  ?  ?  ?x  ?  ? Ankle Pumps  10 15 15     Quad Sets  10 15 15     Gluteal Sets  10 15 15     Hip ABd/ADduction  10 15 15     Straight Leg Raises  10 15 15     Knee Slides  10 15 15     Short Arc Quads  10 15 15     Long Arc Quads         Chair Slides    15              B = bilateral; AA = active assistive; A = active; P = passive      Treatment/Session Assessment:     Response to Treatment:  Tolerated  well    Education:  ? Home Exercises  ? Fall Precautions  ? Hip Precautions ? xD/C Instruction Review  ?x Knee/Hip Prosthesis Review  ? Walker Management/Safety ? Adaptive Equipment as Needed       Interdisciplinary Collaboration:   o Registered Nurse    After treatment position/precautions:   o Up in chair  o Bed/Chair-wheels locked  o Caregiver at bedside  o Call light within reach  o RN notified  o Family at bedside    Compliance with Program/Exercises: Will assess as treatment progresses. Recommendations/Intent for next treatment session:  Treatment next visit will focus on increasing Mr. Yuan's independence with bed mobility, transfers, gait training, strength/ROM exercises, modalities for pain, and patient education.       Total Treatment Duration:  PT Patient Time In/Time Out  Time In: 1300  Time Out: [address], PT

## 2019-06-01 NOTE — PROGRESS NOTES
June 1, 2019         Post Op day: 1 Day Post-Op   Admit Diagnosis: Primary osteoarthritis of left knee [M17.12]  Osteoarthritis [M19.90]  LAB:    Recent Results (from the past 24 hour(s))   TYPE & SCREEN    Collection Time: 05/31/19  8:10 AM   Result Value Ref Range    Crossmatch Expiration 06/03/2019     ABO/Rh(D) A NEGATIVE     Antibody screen NEG    GLUCOSE, POC    Collection Time: 05/31/19  8:28 AM   Result Value Ref Range    Glucose (POC) 147 (H) 65 - 100 mg/dL   EKG, 12 LEAD, INITIAL    Collection Time: 05/31/19 11:48 AM   Result Value Ref Range    Ventricular Rate 64 BPM    Atrial Rate 64 BPM    P-R Interval 178 ms    QRS Duration 110 ms    Q-T Interval 432 ms    QTC Calculation (Bezet) 445 ms    Calculated P Axis 72 degrees    Calculated R Axis -9 degrees    Calculated T Axis 120 degrees    Diagnosis       Normal sinus rhythm  Incomplete right bundle branch block  Nonspecific T wave abnormality  Abnormal ECG  When compared with ECG of 19-FEB-2019 14:28,  QRS duration has decreased  ST elevation now present in Anterior leads  Nonspecific T wave abnormality no longer evident in Anterior leads  T wave inversion now evident in Lateral leads  Confirmed by VINCE GILMORE (), Ale Anders ([id number]) on 5/31/2019 2:07:54 PM     HEMOGLOBIN    Collection Time: 05/31/19  6:31 PM   Result Value Ref Range    HGB 12.4 (L) 13.6 - 17.2 g/dL   HEMOGLOBIN    Collection Time: 06/01/19  5:23 AM   Result Value Ref Range    HGB 10.6 (L) 13.6 - 23.4 g/dL   METABOLIC PANEL, BASIC    Collection Time: 06/01/19  5:23 AM   Result Value Ref Range    Sodium 142 136 - 145 mmol/L    Potassium 3.9 3.5 - 5.1 mmol/L    Chloride 108 (H) 98 - 107 mmol/L    CO2 25 21 - 32 mmol/L    Anion gap 9 7 - 16 mmol/L    Glucose 147 (H) 65 - 100 mg/dL    BUN 24 (H) 8 - 23 MG/DL    Creatinine 1.03 0.8 - 1.5 MG/DL    GFR est AA >60 >60 ml/min/1.73m2    GFR est non-AA >60 >60 ml/min/1.73m2    Calcium 7.8 (L) 8.3 - 10.4 MG/DL     Vital Signs:    Patient Vitals for the past 8 hrs: BP Temp Pulse Resp SpO2   19 0503 129/81 97.7 °F (36.5 °C) 76 16 96 %   19 0059 151/74 98.1 °F (36.7 °C) 98 16 90 %     Temp (24hrs), Av.9 °F (36.6 °C), Min:97.7 °F (36.5 °C), Max:98.1 °F (36.7 °C)    Pain Control:   Pain Assessment  Pain Scale 1: Numeric (0 - 10)  Pain Intensity 1: 1  Pain Onset 1: years ago  Pain Location 1: Knee  Pain Orientation 1: Left  Pain Description 1: Aching  Pain Intervention(s) 1: Ambulation/Increased Activity, Cold pack  Subjective: Doing well, no complaints     Objective:  No Acute Distress, Alert and Oriented, Neurovascular exam is normal       Assessment:   Patient Active Problem List   Diagnosis Code    Osteoarthritis of hip M16.9    S/P prosthetic total arthroplasty of the hip Z96.649    HTN (hypertension) I10    BPH (benign prostatic hypertrophy) N40.0    Osteoarthritis of right knee M17.11    Osteoarthritis M19.90    Status post total knee replacement, right Z96.651    Status post total knee replacement, left Z96.652       Status Post Procedure(s) (LRB):  LEFT TOTAL KNEE ARTHROPLASTY SPINAL/FNB CHRISTOPHER (Left)        Plan: Continue Physical Therapy, Plan NHP.    Signed By: Ainsley Mcdowell MD

## 2019-06-01 NOTE — PROGRESS NOTES
Pt up in recliner. Alert and Oriented x3. Denies pain. No NV deficits noted. +2 pedal pulses. Surgical bandage clean, dry and intact. Call light within reach.

## 2019-06-02 LAB
HGB BLD-MCNC: 8.7 G/DL (ref 13.6–17.2)
MM INDURATION POC: 0 MM (ref 0–5)
PPD POC: NEGATIVE NEGATIVE

## 2019-06-02 PROCEDURE — 74011250637 HC RX REV CODE- 250/637: Performed by: PHYSICIAN ASSISTANT

## 2019-06-02 PROCEDURE — 85018 HEMOGLOBIN: CPT

## 2019-06-02 PROCEDURE — 97110 THERAPEUTIC EXERCISES: CPT

## 2019-06-02 PROCEDURE — 36415 COLL VENOUS BLD VENIPUNCTURE: CPT

## 2019-06-02 PROCEDURE — 65270000029 HC RM PRIVATE

## 2019-06-02 PROCEDURE — 97116 GAIT TRAINING THERAPY: CPT

## 2019-06-02 RX ADMIN — HYDROMORPHONE HYDROCHLORIDE 2 MG: 2 TABLET ORAL at 15:49

## 2019-06-02 RX ADMIN — CELECOXIB 200 MG: 200 CAPSULE ORAL at 08:47

## 2019-06-02 RX ADMIN — CELECOXIB 200 MG: 200 CAPSULE ORAL at 21:06

## 2019-06-02 RX ADMIN — ACETAMINOPHEN 1000 MG: 500 TABLET, FILM COATED ORAL at 05:47

## 2019-06-02 RX ADMIN — TAMSULOSIN HYDROCHLORIDE 0.4 MG: 0.4 CAPSULE ORAL at 08:47

## 2019-06-02 RX ADMIN — ASPIRIN 81 MG: 81 TABLET ORAL at 08:47

## 2019-06-02 RX ADMIN — Medication 1 AMPULE: at 08:49

## 2019-06-02 RX ADMIN — HYDROMORPHONE HYDROCHLORIDE 2 MG: 2 TABLET ORAL at 21:06

## 2019-06-02 RX ADMIN — FINASTERIDE 5 MG: 5 TABLET, FILM COATED ORAL at 21:06

## 2019-06-02 RX ADMIN — HYDROCHLOROTHIAZIDE 25 MG: 25 TABLET ORAL at 08:47

## 2019-06-02 RX ADMIN — ASPIRIN 81 MG: 81 TABLET ORAL at 21:06

## 2019-06-02 RX ADMIN — TERAZOSIN HYDROCHLORIDE 10 MG: 5 CAPSULE ORAL at 21:06

## 2019-06-02 RX ADMIN — HYDROMORPHONE HYDROCHLORIDE 2 MG: 2 TABLET ORAL at 09:46

## 2019-06-02 RX ADMIN — Medication 1 AMPULE: at 21:06

## 2019-06-02 RX ADMIN — ACETAMINOPHEN 1000 MG: 500 TABLET, FILM COATED ORAL at 01:26

## 2019-06-02 RX ADMIN — HYDROMORPHONE HYDROCHLORIDE 2 MG: 2 TABLET ORAL at 01:26

## 2019-06-02 RX ADMIN — DOCUSATE SODIUM 100 MG: 100 CAPSULE, LIQUID FILLED ORAL at 08:47

## 2019-06-02 RX ADMIN — HYDROMORPHONE HYDROCHLORIDE 2 MG: 2 TABLET ORAL at 05:47

## 2019-06-02 NOTE — PROGRESS NOTES
Patient in NSR since cardiac monitoring placed on Friday.  Discontinue cardiac monitoring order received from [place], [place].

## 2019-06-02 NOTE — PROGRESS NOTES
2019         Post Op day: 2 Days Post-Op   Admit Diagnosis: Primary osteoarthritis of left knee [M17.12]  Osteoarthritis [M19.90]  LAB:    Recent Results (from the past 24 hour(s))   HEMOGLOBIN    Collection Time: 19  4:31 AM   Result Value Ref Range    HGB 8.7 (L) 13.6 - 17.2 g/dL     Vital Signs:    Patient Vitals for the past 8 hrs:   BP Temp Pulse Resp SpO2   19 0741 159/80 97.8 °F (36.6 °C) 72 16 95 %   19 0445 154/68 97.7 °F (36.5 °C) 75 18 96 %   19 0127 131/79 97.7 °F (36.5 °C) 78 16 97 %     Temp (24hrs), Av.7 °F (36.5 °C), Min:97.3 °F (36.3 °C), Max:97.9 °F (36.6 °C)    Pain Control:   Pain Assessment  Pain Scale 1: Numeric (0 - 10)  Pain Intensity 1: 5  Pain Onset 1: years ago  Pain Location 1: Knee  Pain Orientation 1: Left  Pain Description 1: Aching  Pain Intervention(s) 1: Ambulation/Increased Activity, Cold pack  Subjective: Doing well, pain is well controlled, no complaints     Objective:  No Acute Distress, Alert and Oriented, left knee dressing is C/D/I. Neurovascular exam is normal       Assessment:   Patient Active Problem List   Diagnosis Code    Osteoarthritis of hip M16.9    S/P prosthetic total arthroplasty of the hip Z96.649    HTN (hypertension) I10    BPH (benign prostatic hypertrophy) N40.0    Osteoarthritis of right knee M17.11    Osteoarthritis M19.90    Status post total knee replacement, right Z96.651    Status post total knee replacement, left Z96.652       Status Post Procedure(s) (LRB):  LEFT TOTAL KNEE ARTHROPLASTY SPINAL/FNB CHRISTOPHER (Left)        Plan: Continue Physical Therapy, Monitor Hgb. ASA/SCDs for DVT prophylaxis. Rehab tomorrow.    Signed By: FRANSISOC Thomas

## 2019-06-02 NOTE — PROGRESS NOTES
Problem: Mobility Impaired (Adult and Pediatric)  Goal: *Acute Goals and Plan of Care (Insert Text)  Description  GOALS (1-4 days):  (1.)Mr. Tristan De Los Santos will move from supine to sit and sit to supine  in bed with MODIFIED INDEPENDENCE. (2.)Mr. Tristan De Los Santos will transfer from bed to chair and chair to bed with SUPERVISION using the least restrictive device. (3.)Mr. Tristan De Los Santos will ambulate with SUPERVISION for 200 feet with the least restrictive device. (4.)Mr. Tristan De Los Santos will ambulate up/down 1 steps with left railing with SUPERVISION with no device. (5.)Mr. Tristan De Los Santos will increase left knee ROM to 5°-80°. Met 6/1  ________________________________________________________________________________________________   Outcome: Progressing Towards Goal     PHYSICAL THERAPY JOINT CAMP TKA: Daily Note, Treatment Day: 2nd and AM 6/2/2019  INPATIENT: Hospital Day: 3  [place] / Product Type: Medicare /      NAME/AGE/GENDER: Maxi Thakur is a 75 y.o. male   PRIMARY DIAGNOSIS:  Primary osteoarthritis of left knee [M17.12]   Procedure(s) and Anesthesia Type:     * LEFT TOTAL KNEE ARTHROPLASTY SPINAL/FNB CHRISTOPHER - Spinal (Left)  ICD-10: Treatment Diagnosis:    · Pain in Left Knee (M25.562)  · Stiffness of Left Knee, Not elsewhere classified (M25.662)  · Difficulty in walking, Not elsewhere classified (R26.2)      ASSESSMENT:     Mr. Tristan De Los Santos showed increased gait distance & improved level of assist for transfers & gait in am session. In pm session pt continued to show steady gains with functional mobility & with tolerance to exercises. 6/2--Pt performed exercises in supine. Pt performed supine to sit. Pt performed exercises sitting edge of bed. Pt performed sit to stand. Pt ambulated in hallway. Pt in bedside chair with needs in reach. This section established at most recent assessment   PROBLEM LIST (Impairments causing functional limitations):  1. Decreased Strength  2.  Decreased ADL/Functional Activities  3. Decreased Transfer Abilities  4. Decreased Ambulation Ability/Technique  5. Decreased Balance  6. Increased Pain  7. Decreased Activity Tolerance  8. Decreased Flexibility/Joint Mobility  9. Decreased [place] with Home Exercise Program   INTERVENTIONS PLANNED: (Benefits and precautions of physical therapy have been discussed with the patient.)  1. bed mobility  2. gait training  3. home exercise program (HEP)  4. Range of Motion: active/assisted/passive  5. Therapeutic Activities  6. therapeutic exercise/strengthening  7. transfer training  8. Group Therapy     TREATMENT PLAN: Frequency/Duration: Follow patient BID for duration of hospital stay to address above goals. Rehabilitation Potential For Stated Goals: Good     RECOMMENDED REHABILITATION/EQUIPMENT: (at time of discharge pending progress): Continue Skilled Therapy and Home Health: Physical Therapy. HISTORY:   History of Present Injury/Illness (Reason for Referral):  Left TKA  Past Medical History/Comorbidities:   Mr. 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 Mr. Felisa Lunsford  has a past surgical history that includes hx tonsillectomy; hx other surgical (2002); hx hip replacement (Right, 10/2011); hx other surgical; hx back surgery; and hx knee replacement (Right, 03/06/2019).   Social History/Living Environment:   Home Environment: Private residence  # Steps to Enter: 1  Rails to Enter: No  One/Two Story Residence: One story  Living Alone: No  Support Systems: Spouse/Significant Other/Partner  Patient Expects to be Discharged to[de-identified] Skilled nursing facility  Current DME Used/Available at Home: Commode, bedside, Walker, rolling  Prior Level of Function/Work/Activity:  Pt was independent with a cane prior to this admission. Number of Personal Factors/Comorbidities that affect the Plan of Care: 3+: HIGH COMPLEXITY   EXAMINATION:   Most Recent Physical Functioning:                  LLE PROM  L Knee Flexion: 110  L Knee Extension: 3         Bed Mobility  Supine to Sit: Supervision  Sit to Supine: Supervision    Transfers  Sit to Stand: Stand-by assistance  Stand to Sit: Stand-by assistance    Balance  Sitting: Intact  Standing: Intact  Standing - Static: Fair  Standing - Dynamic : Fair              Weight Bearing Status  Left Side Weight Bearing: As tolerated  Distance (ft): 295 Feet (ft)  Ambulation - Level of Assistance: Stand-by assistance  Assistive Device: Walker, rolling  Gait Abnormalities: Antalgic        Braces/Orthotics: none    Left Knee Cold  Type: Cryocuff      Body Structures Involved:  1. Joints  2. Muscles Body Functions Affected:  1. Sensory/Pain  2. Movement Related Activities and Participation Affected:  1. General Tasks and Demands  2. Mobility   Number of elements that affect the Plan of Care: 4+: HIGH COMPLEXITY   CLINICAL PRESENTATION:   Presentation: Evolving clinical presentation with unstable and unpredictable characteristics: HIGH COMPLEXITY   CLINICAL DECISION MAKIN [facility] Inpatient Short Form  How much difficulty does the patient currently have. .. Unable A Lot A Little None   1. Turning over in bed (including adjusting bedclothes, sheets and blankets)? ? 1   ? 2   ? 3   ? 4   2. Sitting down on and standing up from a chair with arms ( e.g., wheelchair, bedside commode, etc.)   ? 1   ? 2   ? 3   ? 4   3.   Moving from lying on back to sitting on the side of the bed?   ? 1   ? 2   ? 3   ? 4   How much help from another person does the patient currently need. .. Total A Lot A Little None   4. Moving to and from a bed to a chair (including a wheelchair)? ? 1   ? 2   ? 3   ? 4   5. Need to walk in hospital room? ? 1   ? 2   ? 3   ? 4   6. Climbing 3-5 steps with a railing? ? 1   ? 2   ? 3   ? 4   © 2007, Trustees of [address] [place] [address], under license to [employer]. All rights reserved     Score:  Initial: 16 Most Recent: X (Date: -- )    Interpretation of Tool:  Represents activities that are increasingly more difficult (i.e. Bed mobility, Transfers, Gait). Medical Necessity:     · Patient is expected to demonstrate progress in strength, range of motion, balance, coordination and functional technique  ·  to decrease assistance required with bed mobility, transfers & gait   · .  Reason for Services/Other Comments:  · Patient continues to require skilled intervention due to pt not safe with functional mobility   · . Use of outcome tool(s) and clinical judgement create a POC that gives a: Clear prediction of patient's progress: LOW COMPLEXITY            TREATMENT:   (In addition to Assessment/Re-Assessment sessions the following treatments were rendered)     Pre-treatment Symptoms/Complaints:  none  Pain: Initial: numeric scale  Pain Intensity 1: 1  Pain Location 1: Knee  Pain Orientation 1: Left  Post Session:  Complains of some aches and soreness     Therapeutic Exercise: ( 15 minutes):  Exercises per grid below to improve mobility and dynamic movement of leg - left to improve functional endurance . Required minimal verbal cues to promote proper body alignment and promote proper body mechanics. Progressed range and repetitions as indicated. Gait Training ( 8 minutes):  Gait training to improve and/or restore physical functioning as related to mobility and dynamic movement of leg - left to improve functional gait.   Ambulated 295 Feet (ft) with Stand-by assistance using a Walker, rolling and minimal cues related to their stride length and heel strike to promote proper body alignment, promote proper body posture and promote proper body mechanics. Date:  5/31 Date:  6/1 Date:  6/2   ACTIVITY/EXERCISE AM PM AM PM AM PM   GROUP THERAPY  ?  ?  ?  ?x  ?  ? Ankle Pumps  10 15 15 20    Quad Sets  10 15 15 20    Gluteal Sets  10 15 15 20    Hip ABd/ADduction  10 15 15 20    Straight Leg Raises  10 15 15 20    Knee Slides  10 15 15 20    Short Arc Quads  10 15 15 20    Long Arc Quads         Chair Slides    15 20             B = bilateral; AA = active assistive; A = active; P = passive      Treatment/Session Assessment:     Response to Treatment:  Pt agreeable to ambulate in hallway. Education:  ? Home Exercises  ? Fall Precautions  ? Hip Precautions ? xD/C Instruction Review  ?x Knee/Hip Prosthesis Review  ? Walker Management/Safety ? Adaptive Equipment as Needed       Interdisciplinary Collaboration:   o Physical Therapist  o Registered Nurse    After treatment position/precautions:   o Up in chair  o Bed/Chair-wheels locked  o Caregiver at bedside  o Call light within reach  o RN notified  o Family at bedside    Compliance with Program/Exercises: Will assess as treatment progresses. Recommendations/Intent for next treatment session:  Treatment next visit will focus on increasing Mr. Yuan's independence with bed mobility, transfers, gait training, strength/ROM exercises, modalities for pain, and patient education.       Total Treatment Duration:  PT Patient Time In/Time Out  Time In: 1023  Time Out: [address], PT

## 2019-06-02 NOTE — PROGRESS NOTES
Pt in bed. Alert and oriented x4. Denies pain. NV checks WDL. +2 pedal pulses. Bed in low and locked position with call light within reach.

## 2019-06-02 NOTE — PROGRESS NOTES
Problem: Mobility Impaired (Adult and Pediatric)  Goal: *Acute Goals and Plan of Care (Insert Text)  Description  GOALS (1-4 days):  (1.)Mr. Julianna Montoya will move from supine to sit and sit to supine  in bed with MODIFIED INDEPENDENCE. (2.)Mr. Julianna Montoya will transfer from bed to chair and chair to bed with SUPERVISION using the least restrictive device. (3.)Mr. Julianna Montoya will ambulate with SUPERVISION for 200 feet with the least restrictive device. (4.)Mr. Julianna Montoya will ambulate up/down 1 steps with left railing with SUPERVISION with no device. (5.)Mr. Julianna Montoya will increase left knee ROM to 5°-80°. Met 6/1  ________________________________________________________________________________________________   Outcome: Progressing Towards Goal     PHYSICAL THERAPY JOINT CAMP TKA: Daily Note, Treatment Day: 2nd and PM 6/2/2019  INPATIENT: Hospital Day: 3  [place] / Product Type: Medicare /      NAME/AGE/GENDER: Dariel Cisneros is a 75 y.o. male   PRIMARY DIAGNOSIS:  Primary osteoarthritis of left knee [M17.12]   Procedure(s) and Anesthesia Type:     * LEFT TOTAL KNEE ARTHROPLASTY SPINAL/FNB CHRISTOPHER - Spinal (Left)  ICD-10: Treatment Diagnosis:    · Pain in Left Knee (M25.562)  · Stiffness of Left Knee, Not elsewhere classified (M25.662)  · Difficulty in walking, Not elsewhere classified (R26.2)      ASSESSMENT:     Mr. Julianna Montoya showed increased gait distance & improved level of assist for transfers & gait in am session. In pm session pt continued to show steady gains with functional mobility & with tolerance to exercises. 6/2--Pt performed exercises in supine. Pt performed supine to sit. Pt performed exercises sitting edge of bed. Pt performed sit to stand. Pt ambulated in hallway. Pt in bedside chair with needs in reach. PM: Pt performed exercises in bedside chair. Pt performed sit to stand. Pt ambulated in hallway. Pt in bedside chair with needs in reach.  Pt's son and grandson present throughout treatment. Pt progressing with ambulation. This section established at most recent assessment   PROBLEM LIST (Impairments causing functional limitations):  1. Decreased Strength  2. Decreased ADL/Functional Activities  3. Decreased Transfer Abilities  4. Decreased Ambulation Ability/Technique  5. Decreased Balance  6. Increased Pain  7. Decreased Activity Tolerance  8. Decreased Flexibility/Joint Mobility  9. Decreased [place] with Home Exercise Program   INTERVENTIONS PLANNED: (Benefits and precautions of physical therapy have been discussed with the patient.)  1. bed mobility  2. gait training  3. home exercise program (HEP)  4. Range of Motion: active/assisted/passive  5. Therapeutic Activities  6. therapeutic exercise/strengthening  7. transfer training  8. Group Therapy     TREATMENT PLAN: Frequency/Duration: Follow patient BID for duration of hospital stay to address above goals. Rehabilitation Potential For Stated Goals: Good     RECOMMENDED REHABILITATION/EQUIPMENT: (at time of discharge pending progress): Continue Skilled Therapy and Home Health: Physical Therapy. HISTORY:   History of Present Injury/Illness (Reason for Referral):  Left TKA  Past Medical History/Comorbidities:   Mr. 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 Mr. Calista Chaudhry  has a past surgical history that includes hx tonsillectomy; hx other surgical (2002); hx hip replacement (Right, 10/2011); hx other surgical; hx back surgery; and hx knee replacement (Right, 03/06/2019).   Social History/Living Environment:   Home Environment: Private residence  # Steps to Enter: 1  Rails to Enter: No  One/Two Story Residence: One story  Living Alone: No  Support Systems: Spouse/Significant Other/Partner  Patient Expects to be Discharged to[de-identified] Skilled nursing facility  Current DME Used/Available at Home: Commode, bedside, Walker, rolling  Prior Level of Function/Work/Activity:  Pt was independent with a cane prior to this admission. Number of Personal Factors/Comorbidities that affect the Plan of Care: 3+: HIGH COMPLEXITY   EXAMINATION:   Most Recent Physical Functioning:                  LLE PROM  L Knee Flexion: 110  L Knee Extension: 3         Bed Mobility  Supine to Sit: Supervision  Sit to Supine: Supervision    Transfers  Sit to Stand: Supervision  Stand to Sit: Supervision    Balance  Sitting: Intact  Standing: Intact  Standing - Static: Fair  Standing - Dynamic : Fair              Weight Bearing Status  Left Side Weight Bearing: As tolerated  Distance (ft): 300 Feet (ft)  Ambulation - Level of Assistance: Supervision  Assistive Device: Walker, rolling  Gait Abnormalities: Antalgic        Braces/Orthotics: none    Left Knee Cold  Type: Cryocuff      Body Structures Involved:  1. Joints  2. Muscles Body Functions Affected:  1. Sensory/Pain  2. Movement Related Activities and Participation Affected:  1. General Tasks and Demands  2. Mobility   Number of elements that affect the Plan of Care: 4+: HIGH COMPLEXITY   CLINICAL PRESENTATION:   Presentation: Evolving clinical presentation with unstable and unpredictable characteristics: HIGH COMPLEXITY   CLINICAL DECISION MAKIN [facility] Mobility Inpatient Short Form  How much difficulty does the patient currently have. .. Unable A Lot A Little None   1. Turning over in bed (including adjusting bedclothes, sheets and blankets)? ? 1   ? 2   ? 3   ? 4   2.   Sitting down on and standing up from a chair with arms ( e.g., wheelchair, bedside commode, etc.)   ? 1   ? 2   ? 3   ? 4   3. Moving from lying on back to sitting on the side of the bed?   ? 1   ? 2   ? 3   ? 4   How much help from another person does the patient currently need. .. Total A Lot A Little None   4. Moving to and from a bed to a chair (including a wheelchair)? ? 1   ? 2   ? 3   ? 4   5. Need to walk in hospital room? ? 1   ? 2   ? 3   ? 4   6. Climbing 3-5 steps with a railing? ? 1   ? 2   ? 3   ? 4   © 2007, Trustees of [address] [place] [address] [address] [address], under license to [email or web address]. All rights reserved     Score:  Initial: 16 Most Recent: X (Date: -- )    Interpretation of Tool:  Represents activities that are increasingly more difficult (i.e. Bed mobility, Transfers, Gait). Medical Necessity:     · Patient is expected to demonstrate progress in strength, range of motion, balance, coordination and functional technique  ·  to decrease assistance required with bed mobility, transfers & gait   · .  Reason for Services/Other Comments:  · Patient continues to require skilled intervention due to pt not safe with functional mobility   · . Use of outcome tool(s) and clinical judgement create a POC that gives a: Clear prediction of patient's progress: LOW COMPLEXITY            TREATMENT:   (In addition to Assessment/Re-Assessment sessions the following treatments were rendered)     Pre-treatment Symptoms/Complaints:  none  Pain: Initial: numeric scale  Pain Intensity 1: 1  Pain Location 1: Knee  Pain Orientation 1: Left  Post Session:  No complaints     Therapeutic Exercise: ( 15 minutes):  Exercises per grid below to improve mobility and dynamic movement of leg - left to improve functional endurance . Required minimal verbal cues to promote proper body alignment and promote proper body mechanics. Progressed range and repetitions as indicated.   Gait Training ( 13 minutes):  Gait training to improve and/or restore physical functioning as related to mobility and dynamic movement of leg - left to improve functional gait. Ambulated 300 Feet (ft) with Supervision using a Walker, rolling and minimal cues related to their stride length and heel strike to promote proper body alignment, promote proper body posture and promote proper body mechanics. Date:  5/31 Date:  6/1 Date:  6/2   ACTIVITY/EXERCISE AM PM AM PM AM PM   GROUP THERAPY  ?  ?  ?  ?x  ?  ? Ankle Pumps  10 15 15 20 20   Quad Sets  10 15 15 20 20   Gluteal Sets  10 15 15 20 20   Hip ABd/ADduction  10 15 15 20 20   Straight Leg Raises  10 15 15 20 20   Knee Slides  10 15 15 20 20   Short Arc Quads  10 15 15 20 20   Long Arc Quads         Chair Slides    15 20 20            B = bilateral; AA = active assistive; A = active; P = passive      Treatment/Session Assessment:     Response to Treatment:  Pt agreeable to exercises and ambulate in hallway. Education:  ? Home Exercises  ? Fall Precautions  ? Hip Precautions ? xD/C Instruction Review  ?x Knee/Hip Prosthesis Review  ? Walker Management/Safety ? Adaptive Equipment as Needed       Interdisciplinary Collaboration:   o Physical Therapist  o Registered Nurse    After treatment position/precautions:   o Up in chair  o Bed/Chair-wheels locked  o Caregiver at bedside  o Call light within reach  o RN notified  o Family at bedside    Compliance with Program/Exercises: Will assess as treatment progresses. Recommendations/Intent for next treatment session:  Treatment next visit will focus on increasing Mr. Yuan's independence with bed mobility, transfers, gait training, strength/ROM exercises, modalities for pain, and patient education.       Total Treatment Duration:  PT Patient Time In/Time Out  Time In: 1330  Time Out: [address] [facility], PT

## 2019-06-02 NOTE — PROGRESS NOTES
Care Management Interventions  PCP Verified by CM: Yes  Mode of Transport at Discharge: [address] (CM Consult): SNF  Partner SNF: Yes  Physical Therapy Consult: Yes  Current Support Network: Own Home  Plan discussed with Pt/Family/Caregiver: Yes  Freedom of Choice Offered: Yes  Discharge Location  Discharge Placement: Skilled nursing facility([facility])   Leeanne Gavin made referral to [facility]. She will follow up on Monday.

## 2019-06-03 VITALS
DIASTOLIC BLOOD PRESSURE: 95 MMHG | BODY MASS INDEX: 30.3 KG/M2 | OXYGEN SATURATION: 98 % | TEMPERATURE: 97.8 F | RESPIRATION RATE: 16 BRPM | SYSTOLIC BLOOD PRESSURE: 154 MMHG | HEIGHT: 75 IN | WEIGHT: 243.7 LBS | HEART RATE: 78 BPM

## 2019-06-03 LAB
HGB BLD-MCNC: 8.5 G/DL (ref 13.6–17.2)
MM INDURATION POC: NORMAL MM (ref 0–5)
PPD POC: NORMAL NEGATIVE

## 2019-06-03 PROCEDURE — 97150 GROUP THERAPEUTIC PROCEDURES: CPT

## 2019-06-03 PROCEDURE — 36415 COLL VENOUS BLD VENIPUNCTURE: CPT

## 2019-06-03 PROCEDURE — 85018 HEMOGLOBIN: CPT

## 2019-06-03 PROCEDURE — 97116 GAIT TRAINING THERAPY: CPT

## 2019-06-03 PROCEDURE — 74011250637 HC RX REV CODE- 250/637: Performed by: PHYSICIAN ASSISTANT

## 2019-06-03 PROCEDURE — 99239 HOSP IP/OBS DSCHRG MGMT >30: CPT | Performed by: PHYSICAL MEDICINE & REHABILITATION

## 2019-06-03 PROCEDURE — 77030012935 HC DRSG AQUACEL BMS -B

## 2019-06-03 PROCEDURE — 74011250637 HC RX REV CODE- 250/637: Performed by: ORTHOPAEDIC SURGERY

## 2019-06-03 RX ORDER — POLYETHYLENE GLYCOL 3350 17 G/17G
17 POWDER, FOR SOLUTION ORAL DAILY
Status: DISCONTINUED | OUTPATIENT
Start: 2019-06-03 | End: 2019-06-03 | Stop reason: HOSPADM

## 2019-06-03 RX ADMIN — HYDROCHLOROTHIAZIDE 25 MG: 25 TABLET ORAL at 08:37

## 2019-06-03 RX ADMIN — Medication 1 AMPULE: at 09:00

## 2019-06-03 RX ADMIN — ASPIRIN 81 MG: 81 TABLET ORAL at 08:37

## 2019-06-03 RX ADMIN — HYDROMORPHONE HYDROCHLORIDE 2 MG: 2 TABLET ORAL at 05:22

## 2019-06-03 RX ADMIN — ACETAMINOPHEN 1000 MG: 500 TABLET, FILM COATED ORAL at 05:21

## 2019-06-03 RX ADMIN — CELECOXIB 200 MG: 200 CAPSULE ORAL at 08:37

## 2019-06-03 RX ADMIN — SENNOSIDES,DOCUSATE SODIUM 2 TABLET: 50; 8.6 TABLET, FILM COATED ORAL at 08:37

## 2019-06-03 RX ADMIN — HYDROMORPHONE HYDROCHLORIDE 2 MG: 2 TABLET ORAL at 12:54

## 2019-06-03 RX ADMIN — TAMSULOSIN HYDROCHLORIDE 0.4 MG: 0.4 CAPSULE ORAL at 08:37

## 2019-06-03 RX ADMIN — HYDROMORPHONE HYDROCHLORIDE 2 MG: 2 TABLET ORAL at 09:30

## 2019-06-03 RX ADMIN — HYDROMORPHONE HYDROCHLORIDE 2 MG: 2 TABLET ORAL at 00:52

## 2019-06-03 RX ADMIN — ACETAMINOPHEN 1000 MG: 500 TABLET, FILM COATED ORAL at 00:52

## 2019-06-03 RX ADMIN — POLYETHYLENE GLYCOL 3350 17 G: 17 POWDER, FOR SOLUTION ORAL at 09:00

## 2019-06-03 RX ADMIN — ACETAMINOPHEN 1000 MG: 500 TABLET, FILM COATED ORAL at 12:54

## 2019-06-03 NOTE — PROGRESS NOTES
Shift assessment complete. Bed low to ground, Bed rails up x 3. Pt resting in bed. Pt able to dorsi/plantar flex bilaterally with + 2 pedal pulses. Pain well controlled. No neurovascular problems at this time. IS at bedside and pt educated to use hourly.

## 2019-06-03 NOTE — PROGRESS NOTES
TRANSFER - OUT REPORT:    Verbal report given to Erlinda Ascencio on Juany Tiffany  being transferred to [facility]  for routine post - op       Report consisted of patients Situation, Background, Assessment and   Recommendations(SBAR). Information from the following report(s) SBAR was reviewed with the receiving nurse. Lines:       Opportunity for questions and clarification was provided.       Patient transported with:

## 2019-06-03 NOTE — PROGRESS NOTES
Problem: Mobility Impaired (Adult and Pediatric)  Goal: *Acute Goals and Plan of Care (Insert Text)  Description  GOALS (1-4 days):  (1.)Mr. ARIEL ROLLE will move from supine to sit and sit to supine  in bed with MODIFIED INDEPENDENCE. (2.)Mr. ARIEL ROLLE will transfer from bed to chair and chair to bed with SUPERVISION using the least restrictive device. (3.)Mr. GEORGE [facility] will ambulate with SUPERVISION for 200 feet with the least restrictive device. (4.)Mr. GEORGE [facility] will ambulate up/down 1 steps with left railing with SUPERVISION with no device. (5.)Mr. ARIEL ROLLE will increase left knee ROM to 5°-80°. Met 6/1  ________________________________________________________________________________________________   Outcome: Progressing Towards Goal     PHYSICAL THERAPY JOINT CAMP TKA: Daily Note and AM 6/3/2019  INPATIENT: Hospital Day: 4  [place] / [place] / Product Type: Medicare /      NAME/AGE/GENDER: Maxi Thakur is a 75 y.o. male   PRIMARY DIAGNOSIS:  Primary osteoarthritis of left knee [M17.12]   Procedure(s) and Anesthesia Type:     * LEFT TOTAL KNEE ARTHROPLASTY SPINAL/FNB CHRISTOPHER - Spinal (Left)  ICD-10: Treatment Diagnosis:    · Pain in Left Knee (M25.562)  · Stiffness of Left Knee, Not elsewhere classified (M25.662)  · Difficulty in walking, Not elsewhere classified (R26.2)      ASSESSMENT:     Mr. ARIEL ROLLE sitting in the chair upon arrival.  Walk to the gym and back, while in gym performs TK exercises without any problems. He is going to rehab  This section established at most recent assessment   PROBLEM LIST (Impairments causing functional limitations):  1. Decreased Strength  2. Decreased ADL/Functional Activities  3. Decreased Transfer Abilities  4. Decreased Ambulation Ability/Technique  5. Decreased Balance  6. Increased Pain  7. Decreased Activity Tolerance  8. Decreased Flexibility/Joint Mobility  9.  Decreased [place] with Home Exercise Program   INTERVENTIONS PLANNED: (Benefits and precautions of physical therapy have been discussed with the patient.)  1. bed mobility  2. gait training  3. home exercise program (HEP)  4. Range of Motion: active/assisted/passive  5. Therapeutic Activities  6. therapeutic exercise/strengthening  7. transfer training  8. Group Therapy     TREATMENT PLAN: Frequency/Duration: Follow patient BID for duration of hospital stay to address above goals. Rehabilitation Potential For Stated Goals: Good     RECOMMENDED REHABILITATION/EQUIPMENT: (at time of discharge pending progress): Continue Skilled Therapy and Home Health: Physical Therapy. HISTORY:   History of Present Injury/Illness (Reason for Referral):  Left TKA  Past Medical History/Comorbidities:   Mr. Mg  has a past medical history of Arthritis, Avascular necrosis (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 Mr. Mg  has a past surgical history that includes hx tonsillectomy; hx other surgical (2002); hx hip replacement (Right, 10/2011); hx other surgical; hx back surgery; and hx knee replacement (Right, 03/06/2019). Social History/Living Environment:   Home Environment: Private residence  # Steps to Enter: 1  Rails to Enter: No  One/Two Story Residence: One story  Living Alone: No  Support Systems: Spouse/Significant Other/Partner  Patient Expects to be Discharged to[de-identified] Skilled nursing facility  Current DME Used/Available at Home: Commode, bedside, Walker, rolling  Prior Level of Function/Work/Activity:  Pt was independent with a cane prior to this admission.    Number of Personal Factors/Comorbidities that affect the Plan of Care: 3+: HIGH COMPLEXITY   EXAMINATION:   Most Recent Physical Functioning:                  LLE PROM  L Knee Flexion: 90  L Knee Extension: 10              Transfers  Sit to Stand: Stand-by assistance  Stand to Sit: Stand-by assistance                   Weight Bearing Status  Left Side Weight Bearing: As tolerated  Distance (ft): 288 Feet (ft)(x 2)  Ambulation - Level of Assistance: Stand-by assistance  Gait Abnormalities: Antalgic        Braces/Orthotics: none    Left Knee Cold  Type: Cryocuff      Body Structures Involved:  1. Joints  2. Muscles Body Functions Affected:  1. Sensory/Pain  2. Movement Related Activities and Participation Affected:  1. General Tasks and Demands  2. Mobility   Number of elements that affect the Plan of Care: 4+: HIGH COMPLEXITY   CLINICAL PRESENTATION:   Presentation: Evolving clinical presentation with unstable and unpredictable characteristics: HIGH COMPLEXITY   CLINICAL DECISION MAKIN [facility] Mobility Inpatient Short Form  How much difficulty does the patient currently have. .. Unable A Lot A Little None   1. Turning over in bed (including adjusting bedclothes, sheets and blankets)? ? 1   ? 2   ? 3   ? 4   2. Sitting down on and standing up from a chair with arms ( e.g., wheelchair, bedside commode, etc.)   ? 1   ? 2   ? 3   ? 4   3. Moving from lying on back to sitting on the side of the bed?   ? 1   ? 2   ? 3   ? 4   How much help from another person does the patient currently need. .. Total A Lot A Little None   4. Moving to and from a bed to a chair (including a wheelchair)? ? 1   ? 2   ? 3   ? 4   5. Need to walk in hospital room? ? 1   ? 2   ? 3   ? 4   6. Climbing 3-5 steps with a railing? ? 1   ? 2   ? 3   ? 4   © , Trustees of [facility] [employer], under license to [employer].  All rights reserved     Score:  Initial: 16 Most Recent: X (Date: -- )    Interpretation of Tool:  Represents activities that are increasingly more difficult (i.e. Bed mobility, Transfers, Gait). Medical Necessity:     · Patient is expected to demonstrate progress in strength, range of motion, balance, coordination and functional technique  ·  to decrease assistance required with bed mobility, transfers & gait   · .  Reason for Services/Other Comments:  · Patient continues to require skilled intervention due to pt not safe with functional mobility   · . Use of outcome tool(s) and clinical judgement create a POC that gives a: Clear prediction of patient's progress: LOW COMPLEXITY            TREATMENT:   (In addition to Assessment/Re-Assessment sessions the following treatments were rendered)     Pre-treatment Symptoms/Complaints:  Doing ok  Pain: Initial: numeric scale  Pain Intensity 1: 0(0/10 after therapy)  Post Session:       Therapeutic Exercise: (45 Minutes(group therapy):  Exercises per grid below to improve mobility and dynamic movement of leg - left to improve functional endurance . Required minimal verbal cues to promote proper body alignment and promote proper body mechanics. Progressed range and repetitions as indicated. Gait Training (15 Minutes):  Gait training to improve and/or restore physical functioning as related to mobility and dynamic movement of leg - left to improve functional gait. Ambulated 288 Feet (ft)(x 2) with Stand-by assistance using a   and minimal cues related to their stride length and heel strike to promote proper body alignment, promote proper body posture and promote proper body mechanics. Date:  5/31 Date:  6/1 Date:  6/3   ACTIVITY/EXERCISE AM PM AM PM AM PM   GROUP THERAPY  ?  ?  ?  ?x  ?  ?    Ankle Pumps  10 15 15 20    Quad Sets  10 15 15 20    Gluteal Sets  10 15 15 20    Hip ABd/ADduction  10 15 15 20    Straight Leg Raises  10 15 15 20    Knee Slides  10 15 15 20    Short Arc Quads  10 15 15 20    Long Arc Quads         Chair Slides    15 20             B = bilateral; AA = active assistive; A = active; P = passive      Treatment/Session Assessment:     Response to Treatment:  Pt agreeable to exercises and ambulate in hallway. Education:  ? Home Exercises  ? Fall Precautions  ? Hip Precautions ? xD/C Instruction Review  ?x Knee/Hip Prosthesis Review  ? Walker Management/Safety ? Adaptive Equipment as Needed       Interdisciplinary Collaboration:   o Physical Therapy Assistant  o Registered Nurse    After treatment position/precautions:   o Up in chair  o Bed/Chair-wheels locked  o Caregiver at bedside  o Call light within reach  o RN notified  o Family at bedside    Compliance with Program/Exercises: Will assess as treatment progresses. Recommendations/Intent for next treatment session:  Treatment next visit will focus on increasing Mr. Yuan's independence with bed mobility, transfers, gait training, strength/ROM exercises, modalities for pain, and patient education.       Total Treatment Duration:  PT Patient Time In/Time Out  Time In: 0930  Time Out: Ariadna Jeffries, PTA

## 2019-06-03 NOTE — PROGRESS NOTES
Gypsy 3, 2019         Post Op day: 3 Days Post-OpProcedure(s) (LRB):  LEFT TOTAL KNEE ARTHROPLASTY SPINAL/FNB CHRISTOPHER (Left)      Admit Date: 2019  Admit Diagnosis: Primary osteoarthritis of left knee [M17.12]  Osteoarthritis [M19.90]    LAB:    Recent Results (from the past 24 hour(s))   PLEASE READ & DOCUMENT PPD TEST IN 48 HRS    Collection Time: 19  9:00 AM   Result Value Ref Range    PPD Negative Negative    mm Induration 0 0 - 5 mm   HEMOGLOBIN    Collection Time: 19  3:47 AM   Result Value Ref Range    HGB 8.5 (L) 13.6 - 17.2 g/dL     Vital Signs:    Patient Vitals for the past 8 hrs:   BP Temp Pulse Resp SpO2   19 0408 136/69 98.2 °F (36.8 °C) 68 18 95 %   19 0052 134/71 98.3 °F (36.8 °C) 81 18 97 %     Temp (24hrs), Av.9 °F (36.6 °C), Min:97.1 °F (36.2 °C), Max:98.3 °F (36.8 °C)    Body mass index is 30.46 kg/m².   Pain Control:   Pain Assessment  Pain Scale 1: Numeric (0 - 10)  Pain Intensity 1: 5  Pain Onset 1: years ago  Pain Location 1: Knee  Pain Orientation 1: Left  Pain Description 1: Aching  Pain Intervention(s) 1: Ambulation/Increased Activity, Cold pack    Subjective: Doing well, No complaints, No SOB, No Chest Pain, No Nausea or Vomitting     Objective: Vital Signs are Stable, No Acute Distress, Alert and Oriented, Dressing is Dry,  Neurovascular exam is normal.       PT/OT:            Assistive Device: Walker (comment)           LLE PROM  L Knee Flexion: 110  L Knee Extension: 3    Weight Bearing Status: WBAT    Meds:  [unfilled]  [unfilled]  [unfilled]    Assessment:   Patient Active Problem List   Diagnosis Code    Osteoarthritis of hip M16.9    S/P prosthetic total arthroplasty of the hip Z96.649    HTN (hypertension) I10    BPH (benign prostatic hypertrophy) N40.0    Osteoarthritis of right knee M17.11    Osteoarthritis M19.90    Status post total knee replacement, right Z96.651    Status post total knee replacement, left H72.088             Plan: Continue Physical Therapy, Monitor  Hbg, rehab today.         Signed By: Srikanth Delgado MD

## 2019-06-03 NOTE — DISCHARGE SUMMARY
REHABILITATION DISCHARGE SUMMARY     Patient: Jose L Merida MRN: [id number]  SSN: [id number]    YOB: 1945  Age: 75 y.o. Sex: male      Date: 6/3/2019  Admit Date: 5/31/2019  Discharge Date: 6/3/2019    Admitting Physician: Destiny Lion MD   Primary Care Physician: Enrique Mattson MD     Admission Condition: good    Chief Complaint : Gait dysfunction secondary to below. Admit Diagnosis: Primary osteoarthritis of left knee [M17.12]; Osteoarthritis [M19.90]  left total knee arthroplasty 5/31/2019  Pain  DVT risk  Post op acute blood loss anemia  R TKA 3/6/2019  CMT (Charcot-Leslie-Tooth disease)  Osteoarthritis   Hypertension  Acute Rehab Dx:  Gait impairment  Mobility and ambulation deficits  Self Care/ADL deficits       HPI: Jose L Merida is a 75 y.o. male patient at [facility] who was admitted on 5/31/2019  by Destiny Lion MD with below mentioned medical history, is being seen for Physical Medicine and Rehabilitation. Jose L Merida presented with worseneing left knee pain when weight bearing, walking and with activity due to end stage DJD. The symptoms were not adequately managed with conservative management and has limited the patient's function. Patient underwent a left total knee arthroplasty per Dr. Destiny Lion MD on 5/31/2019. The patient's post operative course has been uncomplicated. Pain, and common post op issues well tolerated. Patient is to be WBAT LLE. Patient is starting to stand, taking steps working with acute PT and OT. Patient is making expected gains with ambulation, mobility, and ROM. Patient shows significant functional deficits, gait dysfunciton due to knee pain, decreased ROM and strength. Jose L Merida is seen and examined today. Medical Records reviewed.   Patient has been independent with ambulation, prior to admission, limited by left knee pain.       Rehabiitation Course:   Functional  Level On Admission: Walk: Modified Independent  Functional Level At Discharge: Walk: Contact Guard Assist  Home Architecture: Home Situation  Home Environment: Private residence (05/31/19 1600)  # Steps to Enter: 1 (05/31/19 1600)  Rails to Enter: No (05/31/19 1600)  One/Two Story Residence: One story (05/31/19 1600)  Living Alone: No (05/31/19 1600)  Support Systems: Spouse/Significant Other/Partner (05/31/19 1600)  Patient Expects to be Discharged to[de-identified] Skilled nursing facility (05/31/19 1600)  Current DME Used/Available at Home: Commode, bedside;Walker, rolling (05/31/19 1600)     Past Medical History:   Diagnosis Date    Arthritis     Avascular necrosis ([address]     right hip    BPH (benign prostatic hyperplasia)     medication    CMT (Charcot-Leslie-Tooth disease)     Followed by Dr. 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/2011    Skin cancer     multiple bcc and scc removed     Snores     Status post total knee replacement, left 5/31/2019    Status post total knee replacement, right 3/6/2019      Past Surgical History:   Procedure Laterality Date    HX BACK SURGERY      Lumbar laminectomy- no hardware     HX HIP REPLACEMENT Right 10/2011    total    HX KNEE REPLACEMENT Right 03/06/2019    HX OTHER SURGICAL  2002    skin cancer removed from lip, plastic surgery afterward    HX OTHER SURGICAL      skin cancer from the ear,left leg and behind the area    HX TONSILLECTOMY        Family History   Problem Relation Age of Onset    Arthritis-osteo Mother     Cancer Father     Diabetes Father     Hypertension Other         gen fam hx      Social History     Tobacco Use    Smoking status: Never Smoker    Smokeless tobacco: Never Used   Substance Use Topics    Alcohol use: No       Allergies   Allergen Reactions    Adhesive Rash       Prior to Admission medications    Medication Sig Start Date End Date Taking? Authorizing Provider   aspirin delayed-release 81 mg tablet Take 1 Tab by mouth every twelve (12) hours for 35 days. 5/31/19 7/5/19 Yes FRANSISCO Hermosillo   HYDROmorphone (DILAUDID) 2 mg tablet Take 1 Tab by mouth every four (4) hours as needed for Pain for up to 30 days. Max Daily Amount: 12 mg. 5/31/19 6/30/19 Yes FRANSISCO Hermosillo   diclofenac EC (VOLTAREN) 75 mg EC tablet Take 75 mg by mouth daily. Yes Provider, Historical   finasteride (PROSCAR) 5 mg tablet Take 5 mg by mouth nightly. Indications: enlarged prostate with urination problem   Yes Provider, Historical   HYDROcodone-acetaminophen (NORCO) 5-325 mg per tablet Take  by mouth every four (4) hours as needed for Pain. Indications: Pain, for 3 days s/p tooth removal 5-23-19   Yes Provider, Historical   diphenhydrAMINE (BENADRYL) 25 mg capsule Take 25 mg by mouth nightly. Indications: inflammation of the nose due to an allergy   Yes Provider, Historical   acetaminophen (TYLENOL) 500 mg tablet Take 500-1,000 mg by mouth every six (6) hours as needed for Pain. Yes Chase Whatley MD   cholecalciferol, VITAMIN D3, (VITAMIN D3) 5,000 unit tab tablet Take 5,000 Units by mouth every seven (7) days. on Mondays   Yes Provider, Historical   docusate sodium (STOOL SOFTENER) 100 mg capsule Take 100 mg by mouth daily. Yes Provider, Historical   tamsulosin (FLOMAX) 0.4 mg capsule Take 0.4 mg by mouth daily. Yes Provider, Historical   terazosin (HYTRIN) 10 mg capsule Take 10 mg by mouth nightly. Yes Provider, Historical   hydrochlorothiazide (HYDRODIURIL) 25 mg tablet Take 25 mg by mouth daily. Yes Provider, Historical   amoxicillin (AMOXIL) 875 mg tablet Take 875 mg by mouth two (2) times a day.  Indications: take 7 days s/p tooth removal on 5-23-19    Provider, Historical   tadalafil (CIALIS) 5 mg tablet Take 1 Tab by mouth daily. Patient taking differently: Take 5 mg by mouth nightly.  1/22/19   Jose A Sanchez MD       Current Medications:  Current Facility-Administered Medications   Medication Dose Route Frequency Provider Last Rate Last Dose    polyethylene glycol (MIRALAX) packet 17 g  17 g Oral DAILY Joey Chua MD        docusate sodium (COLACE) capsule 100 mg  100 mg Oral DAILY Pama Ripper, PA   100 mg at 06/02/19 0847    finasteride (PROSCAR) tablet 5 mg  5 mg Oral QHS Pama Ripper, PA   5 mg at 06/02/19 2106    hydroCHLOROthiazide (HYDRODIURIL) tablet 25 mg  25 mg Oral DAILY Pama Ripper, PA   25 mg at 06/03/19 0837    tamsulosin (FLOMAX) capsule 0.4 mg  0.4 mg Oral DAILY Pama Ripper, PA   0.4 mg at 06/03/19 8939    terazosin (HYTRIN) capsule 10 mg  10 mg Oral QHS Pama Ripper, PA   10 mg at 06/02/19 2106    alcohol 62% (NOZIN) nasal  1 Ampule  1 Ampule Topical Q12H Pama Ripper, PA   1 Ampule at 06/02/19 2106    sodium chloride (NS) flush 5-40 mL  5-40 mL IntraVENous Q8H Pama Ripper, PA        sodium chloride (NS) flush 5-40 mL  5-40 mL IntraVENous PRN Pama Ripper, PA        acetaminophen (TYLENOL) tablet 1,000 mg  1,000 mg Oral Q6H Pama Ripper, PA   1,000 mg at 06/03/19 5684    celecoxib (CELEBREX) capsule 200 mg  200 mg Oral Q12H Pama Ripper, PA   200 mg at 06/03/19 6381    HYDROmorphone (DILAUDID) tablet 2 mg  2 mg Oral Q4H PRN Pama Ripper, PA   2 mg at 06/03/19 0930    HYDROmorphone (PF) (DILAUDID) injection 1 mg  1 mg IntraVENous Q3H PRN Pama Ripper, PA        naloxone [facility]) injection 0.2-0.4 mg  0.2-0.4 mg IntraVENous Q10MIN PRN Pama Ripper, PA        ondansetron [facility]) injection 4 mg  4 mg IntraVENous Q4H PRN PamFRANSISCO Gutierres        diphenhydrAMINE (BENADRYL) capsule 25 mg  25 mg Oral Q4H PRN FRANSISCO Zarco   25 mg at 06/01/19 0102    senna-docusate (PERICOLACE) 8.6-50 mg per tablet 2 Tab 2 Tab Oral DAILY Gunner Verduzcos, [place]   2 Tab at 06/03/19 3525    aspirin delayed-release tablet 81 mg  81 mg Oral Q12H FRANSISCO Connell   81 mg at 06/03/19 6093        Review of Systems: Denies chest pain, shortness of breath, cough, headache, visual problems, abdominal pain, dysurea, calf pain. Pertinent positives are as noted in the medical records and unremarkable otherwise. Vital Signs:   Patient Vitals for the past 8 hrs:   BP Temp Pulse Resp SpO2   06/03/19 0706 (!) 154/95 97.8 °F (36.6 °C) 78 16 98 %   06/03/19 0408 136/69 98.2 °F (36.8 °C) 68 18 95 %        Physical Exam:   General: Alert and age appropriately oriented. No acute cardio respiratory distress. HEENT: Normocephalic. Oral mucosa moist without cyanosis. Lungs: Clear to auscultation  bilaterally. Respiration even and unlabored   Heart: Regular rate and rhythm, S1, S2   No  murmurs, clicks, rub or gallops   Abdomen: Soft, non-tender, nondistended. Bowel sounds present   Genitourinary: defered   Neuromuscular:      Grossly no focal neurological deficits noted. L Ankle dorsiflexion 5/5   L Ankle plantarflexion 5/5  R Ankle dorsiflexion 5/5   R Ankle plantarflexion 5/5  No sensory deficits. Skin/extremity: No rashes, no erythema. Calves soft. Wound covered.      Skin Incision(s)/Wound(s):        Lab Review:   Recent Results (from the past 72 hour(s))   EKG, 12 LEAD, INITIAL    Collection Time: 05/31/19 11:48 AM   Result Value Ref Range    Ventricular Rate 64 BPM    Atrial Rate 64 BPM    P-R Interval 178 ms    QRS Duration 110 ms    Q-T Interval 432 ms    QTC Calculation (Bezet) 445 ms    Calculated P Axis 72 degrees    Calculated R Axis -9 degrees    Calculated T Axis 120 degrees    Diagnosis       Normal sinus rhythm  Incomplete right bundle branch block  Nonspecific T wave abnormality  Abnormal ECG  When compared with ECG of 19-FEB-2019 14:28,  QRS duration has decreased  ST elevation now present in Anterior leads  Nonspecific T wave abnormality no longer evident in Anterior leads  T wave inversion now evident in Lateral leads  Confirmed by VINCE GILMORE (), Katerin Castro ([id number]) on 5/31/2019 2:07:54 PM     HEMOGLOBIN    Collection Time: 05/31/19  6:31 PM   Result Value Ref Range    HGB 12.4 (L) 13.6 - 17.2 g/dL   HEMOGLOBIN    Collection Time: 06/01/19  5:23 AM   Result Value Ref Range    HGB 10.6 (L) 13.6 - 05.5 g/dL   METABOLIC PANEL, BASIC    Collection Time: 06/01/19  5:23 AM   Result Value Ref Range    Sodium 142 136 - 145 mmol/L    Potassium 3.9 3.5 - 5.1 mmol/L    Chloride 108 (H) 98 - 107 mmol/L    CO2 25 21 - 32 mmol/L    Anion gap 9 7 - 16 mmol/L    Glucose 147 (H) 65 - 100 mg/dL    BUN 24 (H) 8 - 23 MG/DL    Creatinine 1.03 0.8 - 1.5 MG/DL    GFR est AA >60 >60 ml/min/1.73m2    GFR est non-AA >60 >60 ml/min/1.73m2    Calcium 7.8 (L) 8.3 - 10.4 MG/DL   PLEASE READ & DOCUMENT PPD TEST IN 24 HRS    Collection Time: 06/01/19  8:00 AM   Result Value Ref Range    PPD Negative Negative    mm Induration 0 0 mm   HEMOGLOBIN    Collection Time: 06/02/19  4:31 AM   Result Value Ref Range    HGB 8.7 (L) 13.6 - 17.2 g/dL   PLEASE READ & DOCUMENT PPD TEST IN 48 HRS    Collection Time: 06/02/19  9:00 AM   Result Value Ref Range    PPD Negative Negative    mm Induration 0 0 - 5 mm   HEMOGLOBIN    Collection Time: 06/03/19  3:47 AM   Result Value Ref Range    HGB 8.5 (L) 13.6 - 17.2 g/dL       PT Initial  PT Most Recent   AROM: Generally decreased, functional(right LE) (05/31/19 1600) Generally decreased, functional(right LE) (05/31/19 1600)         Strength: Generally decreased, functional(right LE) (05/31/19 1600) Generally decreased, functional(right LE) (05/31/19 1600)   Coordination: Generally decreased, functional(right LE) (05/31/19 1600) Generally decreased, functional(right LE) (05/31/19 1600)                     Distance (ft): 100 Feet (ft) (05/31/19 1600) 300 Feet (ft) (06/02/19 1359)   Assistive Device: Walker, rolling (05/31/19 1600) Deana Che, rolling (06/02/19 0372)       OT Initial OT Most Recent                                               ST Initial ST Most Recent                        Principal Problem:    Status post total knee replacement, left (5/31/2019)    Active Problems:    Osteoarthritis (3/6/2019)          Condition on discharge :  good  Rehabilitation  potential : Good     Goals in Rehab : Patient to reach maximal rehabilitation potential in functional mobility,ambulation and ADL/ self care skills ; to improve strength and endurance    Expected Length Of Stay : Depending on pace of progress in mobility and self care in PT and OT ,therapies    Discharge Instructions:  Rehabilitation Management/ Medical Management: 1. Devices:Walkers, Type: Rolling Walker  2. Consult:Rehab team including PT, OT,  and . 3. Disposition Rehab-discussed with patient. 4. Thigh-high or knee-high GEORGE's when out of bed. 5. DVT Prophylaxis - aspirin 81mg bid x 30days. Please notify surgeon at [address] if DVT diagnosed. 6. Incentive spirometer Q1H while awake  7. Post op hemorrhagic anemia- monitor. 8. Activity: WBAT LLE  9. Planned Labs: CBC,BMP  10. Pain Control: Continue scheduled tylenol, celebrex and  PRN meds. 11. Wound Care: May remove Aquacel 1 week post op and replace with Tegaderm and sterile gauze dressing. Keep wound clean and dry and reinforce dressing PRN. Remove staples 12-14 post surgery, when incision appears appropriately closed and apply benzoin and 1/2\" steristrips. 12. In case of complications: Please notify surgeon at [address] if suspect infection, cellulitis, wound dehiscence or instrument failure, and if considering starting antibiotic. Follow up with ORTHO per instructions.             Discharge Medications:      celecoxib (CELEBREX) capsule 200 mg    Ordered Dose: 1,000 mg Route: Oral Frequency: EVERY 8 HOURS x 1 week then change to prn.         acetaminophen (TYLENOL) tablet 1,000 mg Ordered Dose: 1,000 mg Route: Oral Frequency: EVERY 8 HOURS x 1 week then change to prn.           senna-docusate (PERICOLACE) 8.6-50 mg per tablet 2 Tab Ordered Dose: 2 Tab Route: Oral Frequency: DAILY       Current Discharge Medication List      START taking these medications    Details   aspirin delayed-release 81 mg tablet Take 1 Tab by mouth every twelve (12) hours for 35 days. Associated Diagnoses: Status post total knee replacement, left      HYDROmorphone (DILAUDID) 2 mg tablet Take 1 Tab by mouth every four (4) hours as needed for Pain for up to 30 days. Max Daily Amount: 12 mg. Associated Diagnoses: Status post total knee replacement, left         CONTINUE these medications which have NOT CHANGED    Details   finasteride (PROSCAR) 5 mg tablet Take 5 mg by mouth nightly. Indications: enlarged prostate with urination problem      diphenhydrAMINE (BENADRYL) 25 mg capsule Take 25 mg by mouth nightly as needed. Indications: inflammation of the nose due to an allergy              cholecalciferol, VITAMIN D3, (VITAMIN D3) 5,000 unit tab tablet Take 5,000 Units by mouth every seven (7) days. on Mondays              tamsulosin (FLOMAX) 0.4 mg capsule Take 0.4 mg by mouth daily. terazosin (HYTRIN) 10 mg capsule Take 10 mg by mouth nightly. hydrochlorothiazide (HYDRODIURIL) 25 mg tablet Take 25 mg by mouth daily. tadalafil (CIALIS) 5 mg tablet Take 1 Tab by mouth daily. Associated Diagnoses: Benign prostatic hyperplasia, unspecified whether lower urinary tract symptoms present         STOP taking these medications       diclofenac EC (VOLTAREN) 75 mg EC tablet Comments:   Reason for Stopping:         HYDROcodone-acetaminophen (NORCO) 5-325 mg per tablet Comments:   Reason for Stopping:               Discharge time: 35 minutes.     Signed By: Chiquita Soto MD     Gypsy 3, 2019

## 2019-06-10 ENCOUNTER — HOME HEALTH ADMISSION (OUTPATIENT)
Dept: HOME HEALTH SERVICES | Facility: HOME HEALTH | Age: 74
End: 2019-06-10
Payer: MEDICARE

## 2019-06-12 ENCOUNTER — HOME CARE VISIT (OUTPATIENT)
Dept: SCHEDULING | Facility: HOME HEALTH | Age: 74
End: 2019-06-12
Payer: MEDICARE

## 2019-06-12 VITALS
SYSTOLIC BLOOD PRESSURE: 152 MMHG | TEMPERATURE: 97.9 F | DIASTOLIC BLOOD PRESSURE: 80 MMHG | HEART RATE: 73 BPM | RESPIRATION RATE: 16 BRPM

## 2019-06-12 VITALS
RESPIRATION RATE: 18 BRPM | OXYGEN SATURATION: 98 % | TEMPERATURE: 97.3 F | HEART RATE: 88 BPM | SYSTOLIC BLOOD PRESSURE: 158 MMHG | DIASTOLIC BLOOD PRESSURE: 82 MMHG

## 2019-06-12 PROCEDURE — 400013 HH SOC

## 2019-06-12 PROCEDURE — G0151 HHCP-SERV OF PT,EA 15 MIN: HCPCS

## 2019-06-12 PROCEDURE — G0299 HHS/HOSPICE OF RN EA 15 MIN: HCPCS

## 2019-06-12 PROCEDURE — 3331090001 HH PPS REVENUE CREDIT

## 2019-06-12 PROCEDURE — 3331090002 HH PPS REVENUE DEBIT

## 2019-06-13 ENCOUNTER — HOME CARE VISIT (OUTPATIENT)
Dept: SCHEDULING | Facility: HOME HEALTH | Age: 74
End: 2019-06-13
Payer: MEDICARE

## 2019-06-13 VITALS
RESPIRATION RATE: 18 BRPM | TEMPERATURE: 98.2 F | SYSTOLIC BLOOD PRESSURE: 148 MMHG | HEART RATE: 88 BPM | DIASTOLIC BLOOD PRESSURE: 80 MMHG

## 2019-06-13 PROCEDURE — 3331090002 HH PPS REVENUE DEBIT

## 2019-06-13 PROCEDURE — 3331090001 HH PPS REVENUE CREDIT

## 2019-06-13 PROCEDURE — G0152 HHCP-SERV OF OT,EA 15 MIN: HCPCS

## 2019-06-14 ENCOUNTER — HOME CARE VISIT (OUTPATIENT)
Dept: SCHEDULING | Facility: HOME HEALTH | Age: 74
End: 2019-06-14
Payer: MEDICARE

## 2019-06-14 PROCEDURE — 3331090002 HH PPS REVENUE DEBIT

## 2019-06-14 PROCEDURE — 3331090001 HH PPS REVENUE CREDIT

## 2019-06-14 PROCEDURE — G0151 HHCP-SERV OF PT,EA 15 MIN: HCPCS

## 2019-06-15 PROCEDURE — 3331090002 HH PPS REVENUE DEBIT

## 2019-06-15 PROCEDURE — 3331090001 HH PPS REVENUE CREDIT

## 2019-06-16 PROCEDURE — 3331090002 HH PPS REVENUE DEBIT

## 2019-06-16 PROCEDURE — 3331090001 HH PPS REVENUE CREDIT

## 2019-06-17 ENCOUNTER — HOME CARE VISIT (OUTPATIENT)
Dept: SCHEDULING | Facility: HOME HEALTH | Age: 74
End: 2019-06-17
Payer: MEDICARE

## 2019-06-17 PROCEDURE — 3331090002 HH PPS REVENUE DEBIT

## 2019-06-17 PROCEDURE — 3331090001 HH PPS REVENUE CREDIT

## 2019-06-17 PROCEDURE — G0157 HHC PT ASSISTANT EA 15: HCPCS

## 2019-06-18 VITALS
RESPIRATION RATE: 18 BRPM | SYSTOLIC BLOOD PRESSURE: 134 MMHG | DIASTOLIC BLOOD PRESSURE: 82 MMHG | TEMPERATURE: 97.9 F | HEART RATE: 66 BPM

## 2019-06-18 PROCEDURE — 3331090002 HH PPS REVENUE DEBIT

## 2019-06-18 PROCEDURE — 3331090001 HH PPS REVENUE CREDIT

## 2019-06-19 ENCOUNTER — HOME CARE VISIT (OUTPATIENT)
Dept: SCHEDULING | Facility: HOME HEALTH | Age: 74
End: 2019-06-19
Payer: MEDICARE

## 2019-06-19 ENCOUNTER — HOME CARE VISIT (OUTPATIENT)
Dept: HOME HEALTH SERVICES | Facility: HOME HEALTH | Age: 74
End: 2019-06-19
Payer: MEDICARE

## 2019-06-19 VITALS
RESPIRATION RATE: 18 BRPM | DIASTOLIC BLOOD PRESSURE: 78 MMHG | TEMPERATURE: 98 F | HEART RATE: 68 BPM | SYSTOLIC BLOOD PRESSURE: 160 MMHG

## 2019-06-19 VITALS
TEMPERATURE: 97.4 F | HEART RATE: 84 BPM | OXYGEN SATURATION: 98 % | DIASTOLIC BLOOD PRESSURE: 86 MMHG | SYSTOLIC BLOOD PRESSURE: 152 MMHG | RESPIRATION RATE: 18 BRPM

## 2019-06-19 PROCEDURE — 3331090001 HH PPS REVENUE CREDIT

## 2019-06-19 PROCEDURE — 3331090002 HH PPS REVENUE DEBIT

## 2019-06-19 PROCEDURE — G0299 HHS/HOSPICE OF RN EA 15 MIN: HCPCS

## 2019-06-20 ENCOUNTER — HOME CARE VISIT (OUTPATIENT)
Dept: HOME HEALTH SERVICES | Facility: HOME HEALTH | Age: 74
End: 2019-06-20
Payer: MEDICARE

## 2019-06-20 ENCOUNTER — HOME CARE VISIT (OUTPATIENT)
Dept: SCHEDULING | Facility: HOME HEALTH | Age: 74
End: 2019-06-20
Payer: MEDICARE

## 2019-06-20 PROCEDURE — 3331090002 HH PPS REVENUE DEBIT

## 2019-06-20 PROCEDURE — G0157 HHC PT ASSISTANT EA 15: HCPCS

## 2019-06-20 PROCEDURE — 3331090001 HH PPS REVENUE CREDIT

## 2019-06-21 PROCEDURE — 3331090001 HH PPS REVENUE CREDIT

## 2019-06-21 PROCEDURE — 3331090002 HH PPS REVENUE DEBIT

## 2019-06-22 PROCEDURE — 3331090001 HH PPS REVENUE CREDIT

## 2019-06-22 PROCEDURE — 3331090002 HH PPS REVENUE DEBIT

## 2019-06-23 PROCEDURE — 3331090002 HH PPS REVENUE DEBIT

## 2019-06-23 PROCEDURE — 3331090001 HH PPS REVENUE CREDIT

## 2019-06-24 ENCOUNTER — HOME CARE VISIT (OUTPATIENT)
Dept: SCHEDULING | Facility: HOME HEALTH | Age: 74
End: 2019-06-24
Payer: MEDICARE

## 2019-06-24 VITALS
TEMPERATURE: 98.8 F | RESPIRATION RATE: 15 BRPM | DIASTOLIC BLOOD PRESSURE: 80 MMHG | SYSTOLIC BLOOD PRESSURE: 150 MMHG | HEART RATE: 74 BPM

## 2019-06-24 PROCEDURE — G0151 HHCP-SERV OF PT,EA 15 MIN: HCPCS

## 2019-06-24 PROCEDURE — 3331090001 HH PPS REVENUE CREDIT

## 2019-06-24 PROCEDURE — 3331090002 HH PPS REVENUE DEBIT

## 2019-06-25 ENCOUNTER — HOME CARE VISIT (OUTPATIENT)
Dept: SCHEDULING | Facility: HOME HEALTH | Age: 74
End: 2019-06-25
Payer: MEDICARE

## 2019-06-25 VITALS
TEMPERATURE: 98.4 F | HEART RATE: 78 BPM | DIASTOLIC BLOOD PRESSURE: 84 MMHG | RESPIRATION RATE: 18 BRPM | SYSTOLIC BLOOD PRESSURE: 138 MMHG | OXYGEN SATURATION: 97 %

## 2019-06-25 PROCEDURE — 3331090002 HH PPS REVENUE DEBIT

## 2019-06-25 PROCEDURE — G0299 HHS/HOSPICE OF RN EA 15 MIN: HCPCS

## 2019-06-25 PROCEDURE — 3331090001 HH PPS REVENUE CREDIT

## 2021-11-02 ENCOUNTER — HOSPITAL ENCOUNTER (OUTPATIENT)
Dept: LAB | Age: 76
Discharge: HOME | End: 2021-11-02

## 2021-11-02 PROCEDURE — 88305 TISSUE EXAM BY PATHOLOGIST: CPT

## 2022-03-18 PROBLEM — Z96.652 STATUS POST TOTAL KNEE REPLACEMENT, LEFT: Status: ACTIVE | Noted: 2019-05-31

## 2022-03-18 PROBLEM — Z96.651 STATUS POST TOTAL KNEE REPLACEMENT, RIGHT: Status: ACTIVE | Noted: 2019-03-06

## 2022-03-18 PROBLEM — M19.90 OSTEOARTHRITIS: Status: ACTIVE | Noted: 2019-03-06

## 2022-03-18 PROBLEM — M17.11 OSTEOARTHRITIS OF RIGHT KNEE: Status: ACTIVE | Noted: 2019-03-06

## 2022-12-06 ENCOUNTER — OFFICE VISIT (OUTPATIENT)
Dept: ORTHOPEDIC SURGERY | Age: 77
End: 2022-12-06

## 2022-12-06 DIAGNOSIS — Z96.651 STATUS POST TOTAL KNEE REPLACEMENT, RIGHT: ICD-10-CM

## 2022-12-06 DIAGNOSIS — M54.50 LOW BACK PAIN, UNSPECIFIED BACK PAIN LATERALITY, UNSPECIFIED CHRONICITY, UNSPECIFIED WHETHER SCIATICA PRESENT: ICD-10-CM

## 2022-12-06 DIAGNOSIS — Z96.641 PRESENCE OF RIGHT HIP IMPLANT: ICD-10-CM

## 2022-12-06 DIAGNOSIS — M25.552 LEFT HIP PAIN: ICD-10-CM

## 2022-12-06 DIAGNOSIS — Z96.652 STATUS POST TOTAL KNEE REPLACEMENT, LEFT: Primary | ICD-10-CM

## 2022-12-06 DIAGNOSIS — M54.16 BILATERAL LUMBAR RADICULOPATHY: ICD-10-CM

## 2022-12-06 NOTE — PROGRESS NOTES
Name: Macey Nguyen  YOB: 1945  Gender: male  MRN: [id number]    CC:   Chief Complaint   Patient presents with    Hip Pain     Left hip pain and low back     Knee Pain     Tushar tka f/u & rt ashley          HPI: Macey Nguyen is a 65 y.o. male with a PMHx of Charcot-Lisa-Tooth disease, chronic back pain, bilateral TKA in 2019 and right ASHLEY 2011. They present here for evaluation of back pain radiating to the left buttock. He is also been diagnosed with Charcot-Lisa-Tooth disease since we last saw him. He now uses walking sticks when out of the home, cane around the house. He is not having any issues with his knees or his hips per se. He continues to have no pain in his knees. No pain in his operative right hip. No groin pain in his left hip. He does have some occasional pain to his left buttock particular with sitting on a hard surface. He says this is relieved with standing and walking for a few minutes. He does complain of low back pain which is not new. He has developed neuropathy to both of his feet. Not on File      Review of Systems:  As per HPI. Pertinent positives and negatives are addressed with the patient, particularly those related to musculoskeletal concerns. Non-orthopaedic concerns were referred back to the primary care physician. PHYSICAL EXAMINATION:   The patient appears their stated age and they are in no distress. There were no vitals taken for this visit. The lower extremities are as described below. No pitting distal edema, venous stasis changes  There is no lymph adenopathy in the popliteal or malleolar region. Sensation is intact to light touch bilaterally. The gait is noted to be antalgic and unsteady, walks with walking sticks. Knee Range of motion is 0 to 120 degrees bilaterally. Incisions are well-healed at the knees.    AP varus and valgus stressing of the bilateral knees reveal global stability  ROM of hips is good and painless bilaterally  Limb lengths are equal.  Hip flexion is excellent. Their judgment and insight are normal.  They are oriented to situation. mood and affect appropriate. X-RAY: AP lateral sunrise views of the bilateral knees reveal uncemented total knee arthroplasties with patella arthroplasties. Components appear very fixed and in good alignment. Slight patella derik on the left. AP and lateral views of the bilateral hips reveal an uncemented right total hip arthroplasty with acetabular femoral components in good position. Left hip reveals good joint space. Gross normal-appearing femoral head. No significant osteophytes. AP lateral spot views lumbar spine reveal multilevel spondylosis and degenerative disc disease with grade 1 possible grade 2 anterolisthesis of L4 on L5. IMPRESSION: Stable bilateral total knee arthroplasties, stable right total hip arthroplasty, normal left, lumbar spondylosis and degenerative disc disease with near grade 2 spondylolisthesis    RECOMMENDATIONS:      Patient's joint replacements are doing well. He does continue to have back pain with some radicular symptoms to his left buttock and thigh area. He is interested in working this up further and would be willing to undergo injection therapy if recommended. Will refer for MRI of the lumbar spine for further evaluation of this. It is likely his problem is multifactorial given his Charcot-Lisa-Tooth disease diagnosis. Some of his peripheral neuropathy likely could not improve with intervention for his lumbar spine alone given this comorbidity. He will likely return to Dr. Houston Crow who did is prior lumbar laminectomy years ago.        4--this is a chronic illness/condition with exacerbation

## 2023-01-04 ENCOUNTER — OFFICE VISIT (OUTPATIENT)
Dept: ORTHOPEDIC SURGERY | Age: 78
End: 2023-01-04
Payer: MEDICARE

## 2023-01-04 VITALS — WEIGHT: 243.6 LBS | BODY MASS INDEX: 28.76 KG/M2 | HEIGHT: 77 IN

## 2023-01-04 DIAGNOSIS — M51.36 DDD (DEGENERATIVE DISC DISEASE), LUMBAR: Primary | ICD-10-CM

## 2023-01-04 DIAGNOSIS — M48.062 LUMBAR STENOSIS WITH NEUROGENIC CLAUDICATION: ICD-10-CM

## 2023-01-04 DIAGNOSIS — M43.16 SPONDYLOLISTHESIS OF LUMBAR REGION: ICD-10-CM

## 2023-01-04 DIAGNOSIS — M48.061 FORAMINAL STENOSIS OF LUMBAR REGION: ICD-10-CM

## 2023-01-04 PROCEDURE — G8484 FLU IMMUNIZE NO ADMIN: HCPCS | Performed by: PHYSICIAN ASSISTANT

## 2023-01-04 PROCEDURE — G8427 DOCREV CUR MEDS BY ELIG CLIN: HCPCS | Performed by: PHYSICIAN ASSISTANT

## 2023-01-04 PROCEDURE — G8417 CALC BMI ABV UP PARAM F/U: HCPCS | Performed by: PHYSICIAN ASSISTANT

## 2023-01-04 PROCEDURE — 99204 OFFICE O/P NEW MOD 45 MIN: CPT | Performed by: PHYSICIAN ASSISTANT

## 2023-01-04 PROCEDURE — 1123F ACP DISCUSS/DSCN MKR DOCD: CPT | Performed by: PHYSICIAN ASSISTANT

## 2023-01-04 PROCEDURE — 1036F TOBACCO NON-USER: CPT | Performed by: PHYSICIAN ASSISTANT

## 2023-01-04 RX ORDER — AMLODIPINE BESYLATE 5 MG/1
TABLET ORAL
COMMUNITY
Start: 2022-12-13

## 2023-01-04 RX ORDER — LISINOPRIL 40 MG/1
TABLET ORAL
COMMUNITY
Start: 2022-12-26

## 2023-01-04 RX ORDER — DICLOFENAC SODIUM 75 MG/1
TABLET, DELAYED RELEASE ORAL
COMMUNITY
Start: 2022-11-09

## 2023-01-04 NOTE — LETTER
Janak Torres                                                     KENISHA RIVERA  1945  MRN [id number]                                              ROOM NUMBER______      Radiographic Studies:    Cervical MRI      Thoracic MRI         Lumbar MRI          Pelvis MRI        CONTRAST    CT Myelogram: _______________   NCS/EMG ________________ ( UE  /  LE )     MRI of ___________________          Other: ____________________      Injections:    KNEE    HIP  Depomedrol _____ mg Euflexxa _____    _______________ TFESI/SNRB  _______________ SI Joint  _______________ EUSEBIA    _______________ Facet  _______________Piriformis/ Sciatica      Medications:    Oral Steroids _______________  NSAIDS _______________    Muscle Relaxers _______________  Neurontin/Lyrica _______________    Pain Medicine _______________  Other _______________                       Physical Therapy:    Lumbar     Thoracic      Cervical     Hip       Knee       Shoulder               Traction          Ultrasound          Dry Needling      Referral:    Pain referral:  [facility]   [facility]   Other: ______________________________    Follow-up/ Refer__________________________________________________    Authorization to hold blood thinners:___________________________________

## 2023-01-04 NOTE — PATIENT INSTRUCTIONS
Adult Spondylolisthesis in the Low Back    In spondylolisthesis, one of the bones in your spine -- called a vertebra -- slips forward and out of place. This may occur anywhere along the spine, but is most common in the lower back (lumbar spine). In some people, this causes no symptoms at all. Others may have back and leg pain that ranges from mild to severe. Understanding how your spine works can help you better understand spondylolisthesis. Types of Spondylolisthesis  Many types of spondylolisthesis can affect adults. The two most common types are degenerative and spondylolytic. There are other less common types of spondylolisthesis, such as slippage caused by a recent, severe fracture or a tumor. Degenerative Spondylolisthesis  As we age, general wear and tear causes changes in the spine. Intervertebral disks begin to dry out and weaken. They lose height, become stiff, and begin to bulge. This disk degeneration is the start to both arthritis and degenerative spondylolisthesis (DS). Degenerative spondylolisthesis  As arthritis develops, it weakens the joints and ligaments that hold your vertebrae in the proper position. The ligament along the back of your spine (ligamentum flavum) may begin to buckle. One of the vertebrae on either side of a worn, flattened disk can loosen and move forward over the vertebra below it. This slippage can narrow the spinal canal and put pressure on the spinal cord. This narrowing of the spinal canal is called spinal stenosis and is a common problem in patients with DS. Women are more likely than men to have DS, and it is more common in patients who are older than 50. A higher incidence has been noted in the -American population. In this x-ray taken from the side, vertebrae in the low back have slipped out of place due to degenerative spondylolisthesis.       Spondylolytic Spondylolisthesis  One of the bones in your lower back can break and this can cause a vertebra to slip forward. The break most often occurs in the area of your lumbar spine called the pars interarticularis. In most cases of spondylolytic spondylolisthesis, the pars fracture occurs during adolescence and goes unnoticed until adulthood. The normal disk degeneration that occurs in adulthood can then stress the pars fracture and cause the vertebra to slip forward. This type of spondylolisthesis is most often seen in middle-aged men. (Left) In spondylolysis, a fracture often occurs at the pars interarticularis. (Right) Because of the pars fracture, only the front part of the bone slips forward. Because a pars fracture causes the front (vertebra) and back (lamina) parts of the spinal bone to disconnect, only the front part slips forward. This means that narrowing of the spinal canal is less likely than in other kinds of spondylolisthesis, such as DS in which the entire spinal bone slips forward. This x-ray taken from the side shows a pars fracture (arrow) and the resulting spondylolisthesis. Symptoms  About 4% to 6% of the [place] population has spondylolysis and spondylolisthesis. Most of these people live with the condition for many years without any pain or other symptoms. Symptoms of Degenerative Spondylolisthesis  Patients with DS often visit the doctor's office once the slippage has begun to put pressure on the spinal nerves. Although the doctor may find arthritis in the spine, the symptoms of DS are typically the same as symptoms of spinal stenosis. For example, DS patients often develop leg and/or lower back pain. The most common symptoms in the legs include a feeling of vague weakness associated with prolonged standing or walking. Leg symptoms may be accompanied by numbness, tingling, and/or pain that is often affected by posture. Forward bending or sitting often relieves the symptoms because it opens up space in the spinal canal. Standing or walking often increases symptoms.     Symptoms of Spondylolytic Spondylolisthesis  Most patients with spondylolytic spondylolisthesis do not have pain and are often surprised to find they have the slippage when they see it in x-rays. They typically visit a doctor with low back pain related to activities. The back pain is sometimes accompanied by leg pain. To Top     Treatment    Nonsurgical Treatment  Although nonsurgical treatments will not repair the slippage, many patients report that these methods do help relieve symptoms. Physical therapy and exercise. Specific exercises can strengthen and stretch your lower back and abdominal muscles. Medication. Analgesics and non-steroidal anti-inflammatory medicines may relieve pain. Steroid injections. Cortisone is a powerful anti-inflammatory. Cortisone injections around the nerves or in the \"epidural space\" can decrease swelling, as well as pain. It is not recommended to receive these, however, more than three times per year. These injections are more likely to decrease pain and numbness, but not weakness of the legs. Surgical Treatment  Surgical candidates with DS. Surgery for degenerative spondylolisthesis is generally reserved for the patient who does not improve after a trial of nonsurgical treatment for at least 3 to 6 months. In making a decision about surgery, your doctor will also take into account the extent of arthritis in your spine, as well as whether your spine has excessive movement. DS patients who are candidates for surgery often are unable to walk or stand, and have a poor quality of life due to the pain and weakness. Surgical candidates with spondylolytic spondylolisthesis. Patients with symptoms that have not responded to nonsurgical treatment for at least 6 to12 months may be candidates for surgery.   If the slippage is getting worse or the patient has progressive neurologic symptoms, such as weakness, numbness, or falling, and/or symptoms of cauda equina syndrome, surgery may help.  Surgical procedures. Surgery for both DS and spondylolytic spondylolisthesis includes removing the pressure from the nerves and spinal fusion. Removing the pressure involves opening up the spinal canal. This procedure is called a laminectomy. Spinal fusion is essentially a \"welding\" process. The basic idea is to fuse together the painful vertebrae so that they heal into a single, solid bone. In spinal fusion, screws are often used to help stabilize the spine. Surgical recovery. The fusion process takes time. It may be several months before the bone is solid, although your comfort level will often improve much faster. Lumbar Stenosis    What is lumbar stenosis? Stenosis is defined as the narrowing of the spinal canal. The term lumbar simply refers to the lowest 5 vertebrae in the spine. Externally this corresponds to the small of the back just above your buttocks. Each lumbar vertebra has 3 tunnels which can be affected by stenosis. The large tunnel in the middle of the vertebra is where the spinal cord and all of the spinal nerves are contained. Also, a much smaller tunnel is on each side of the vertebra. This is where the individual nerves exit the spine. Narrowing of any of these tunnels can result in pressure on the spinal cord or spinal nerves. Spinal stenosis may involve multiple levels of the spine or may be localized to a single level. What causes spinal stenosis? Typically the tunnels in vertebrae are quite spacious and much larger than the spinal cord and nerves that pass through them. However, some patients are genetically programmed to have smaller size tunnels in the spine, predisposing them to develop symptoms from spinal stenosis. There are several other potential causes of spinal stenosis. A single event, such as a disc herniation or a fracture can cause symptoms of stenosis.     But more often, it is caused by arthritic changes, such as bone spurs, thickened ligaments, joint laxity, and disc bulges. This results in pinched spinal nerves which gives symptoms of buttock and leg pain and weakness. What are the symptoms of spinal stenosis? Patients will typically have low back pain along with pain in the buttocks and legs. This usually affects patients more when they are standing or walking, and their pain is often relieved with sitting or lying. Many patients report that the decrease in their ability to walk is the most bothersome part of the condition. And, some have found that leaning forward (such as using a cart while shopping) has enabled them to go further with less pain. Are there other conditions that can cause similar symptoms? The condition which most closely mimics spinal stenosis is poor blood circulation to the legs (vascular claudication). Other conditions such as diabetic neuropathy and hip or knee arthritis also have very similar symptoms to spinal stenosis. What is the prognosis? The natural history of degenerative spinal stenosis is usually a slow progression, gradually reducing the ability to stand or walk for extended periods. What treatments are available? The use of anti-inflammatory medications may give partial relief of symptoms. Physical therapy is often prescribed initially, which may improve lumbar range of motion and strength. Chiropractic care may help ease early symptoms in some patients. A series of steroid injections into the spine may calm the inflammation of the nerves and give temporary relief of the buttock and leg symptoms. Though, these treatments may help the patient cope with the symptoms for several years, they have little effect on the natural history of the disease which is slow progression. When should I consider consulting a spine surgeon?   When you are no longer able to tolerate the symptoms or it is interfering with your everyday activities despite the use of conservative treatments, you may be a good candidate for a decompression type of spine surgery. The procedure typically performed is called a laminectomy. Some patients may require a fusion in addition to the laminectomy if there is too much joint laxity from the arthritic changes in the spine. A decompression operation for spinal stenosis is about 80% effective in reducing your buttock and leg symptoms, including your ability to stand and walk. Though there may be some reduction in low back pain, a laminectomy is much less predictable for the treatment of isolated back pain without symptoms in the buttocks or legs. [facility]    MD Liz Melendez MD Amy [place], PA-C Keller Soda, NP    Main Office  [address] [facility],[address], [address]       For Appointments at either location, please call [phone]     Epidural Steroid Injection / Selective Nerve Root Block  What is it? An epidural steroid injection (EUSEBIA) is an injection of an anti-inflammatory medication directly on the sac that covers the spinal cord and nerves. A Selective Nerve Root Block (SNRB) is an injection that is directed around a specific nerve. Your physician usually orders an injection  when you have symptoms of an irritated spinal nerve. These symptoms can include back, buttock or leg pain, weakness, or numbness. Irritated spinal nerves are often caused by disc herniations or a tight spinal canal (stenosis). The goal of the steroid injection is to reduce the inflammation around the spinal cord and nerves, which should reduce the amount of back and leg pain you are experiencing. Epidural injections are often done in series. It would not be unusual to have two or three injections in a row 10 to 14 days apart. The reason for multiple injections is that the relief from the injection may wear off over time.  And sometimes it takes multiple doses of steroid injection to obtain the most anti-inflammatory effect around the nerves. How is it performed? You will be asked to lie on your side or stomach on the x-ray table. This will allow the physician to position you in the best way possible to access your back. Next, the skin will be cleaned and numbed with a local anesthetic. An X-ray machine will then be used to guide a small gauge needle into the space over your spinal sac. A small amount of dye will then be injected to insure the needle is in the proper position. Finally, a mixture of numbing medicine and anti-inflammatory (steroid/cortisone) will be injected. How long does it take? All together it should take about 1 hour. The back and legs may feel weak or numb for a couple of hours after the injection. Plan the have someone drive you home. Are there any restrictions after the EUSEBIA? Try to spend the remainder of the evening resting as much as possible. You may return to your normal activities the day after the procedure, including returning to work. What are the risks? The most common risk is a spinal headache. This happens when the needle passes through the spinal sac and can result in leakage of spinal fluid. This is usually treated with lying in a flat position and high fluid intake. Rarely, spinal headaches lasting more than a few days can be treated with a small procedure called a blood patch. Another risk, though very small, is the injection of the medication into one of the tiny blood vessels in the spinal canal.  This can result in serious complications such as seizures, cardiac arrest and even death. Fortunately, these complications are quite rare. Other rare complications include infection, bleeding into the spinal canal (epidural hematoma), loss of bladder control, and injury to a nerve with the needle. Who should not have an EUSEBIA?   The injection of a steroid anywhere in the body can cause a significant increase in the blood sugar level. Diabetics are very sensitive to steroids and should have them administered with caution. Diabetic patients can have injections but need to watch their blood sugar after the injection and discuss with their Primary Care Physician a plan to manage elevations in blood sugar. Steroids also decrease the body's ability to fight infection. Thus, any person with an active infection should not take a steroid medication until the infection has been cleared completely. If you are taking an antibiotic for an active infection, please notify our office. Additionally, some patients may have anatomic abnormalities or have had previous back surgeries which may not allow the needle to pass into the spinal canal.     Medications that result in thinning of the blood such as coumadin, aspirin, Plavix, Eliquis, Xarelto and many anti-inflammatory medications need to be discontinued 5-7 days prior to the injection. Check with your doctor regarding specific drugs you are taking. Med    [facility]    Glorya Snellen, MD Nikki Clamp, SAUL Merida NP    Main Office  [facility],[address], [address] [place] [address]       For Appointments at either location, please call [phone] that result in thinning of blood such as Coumadin, Aspirin, Plavix, and many anti-inflammatories, need to Pre procedure teaching sheet: Epidural spinal injection, selective nerve root block injection, sacroiliac injection and facet block injections. You have been scheduled for spinal injection. This injection is to help decrease her back and or leg pain. A local anesthetic will be used to numb the area. Then, a spinal needle will be placed in the appropriate place according to the type of injection ordered by your physician.   A steroid with or without local anesthetic will be injected. A small amount of contrast dye will be used to better visualize the injection site. You will be lying on your stomach. A series of 3 injections may be performed as these are ordered 2 to 3 weeks apart. Be aware, steroids can take 2 to 3 days to begin working, but can take 7 to 10 days for full effectiveness. Therefore, you may not have relief immediately. Please be patient and give the injection time to work. You should not resume any type of strenuous workout routine for at least 3 days following the procedure. Walking is fine. Prior to your procedure    - Please call your primary care physician if you are on blood thinners such as Coumadin, warfarin, heparin, Plavix, Lovenox, Effient, Eliquis, Xarelto, Brilinta, or aspirin or other blood thinner as these will need to be stopped for 3 to 7 days before the injections. We will need verbal approval to stop the thinners and proceed with the injection from the physician treating you with the blood thinners prior to the appointment date. If your physician tells you it is not safe to stop the blood thinner, you must call our office and discuss this with us. We may need to cancel the procedure. - Please do not take any nonsteroidal anti-inflammatory medications (NSAIDs) such as Advil, ibuprofen, Celebrex, meloxicam or Aleve for 3 days before your injection.    - We cannot give you an injection if you are presently taking an antibiotic. - Please continue your other medications as prescribed. -You must bring someone to drive you home. - You may eat prior to your procedure, but we asked that you do not eat a heavy meal within 2 to 4 hours of your procedure. You may drink liquids up to 1 to 2 hours before your appointment time. - If you are diabetic, please adjust your medications as appropriate. If steroid is used be aware that they may increase your blood sugar. Closely monitor your blood sugars after the procedure.   - If you are sick, running a fever, have a virus or are put on antibiotics during the week before your procedure, please call to reschedule. We cannot do injections if you are sick. Day of procedure    - Arrive 15 minutes before your procedure time, register at the . - A staff member will call you from the waiting area and bring you to the exam room. - You may or may not be asked to change into shorts. Please leave valuables at home. If you wear clothing with elastic waist, you will not be required to change. - The nurse will talk with you and have you sign a consent form before your injection. If you have any allergies to Betadine, iodine, shellfish or x-ray dye, please tell the nurse at this time. - You will be positioned on the table on your stomach. A special x-ray machine is used to jose the correct position of the needle. We will clean the area with Betadine or Hibiclens. Sterile towels were placed around the area to be injected. - The doctor will use a local anesthetic to numb the skin. This burns like a bee sting for about 20 seconds. As the needle is advanced, you will feel pressure. - Once the needle is in the appropriate space, the medication will be injected. Once the needle is removed, your back will be cleaned. You will move back to the exam room. - You are required to stay 20 to 30 minutes following the procedure. Although not expected you may have some numbness from the local anesthetic. If this were to interfere with her walking, you will not be discharged from the office until it is safe for you to leave. - Your discharge instructions and follow-up care will be discussed with you prior to leaving the office.           PRODUCTS THAT MAY THIN THE BLOOD    Medications, over-the-counter medications and herbal supplements    Aches-N-Pain    Disalcid   Meclenofenamate   Plavix  Acetylsalicylic acid (ASA)  Anatoliy Pills   Meclomen    Ponstel  Actron     Dolobid   Medipren    Relefen  Advil     Dristan    Mefenamic acid   Robaxisal  Aleve     Ecotrin    Meloxicam    Rufen  [place]    Empirin   Menadol    Saleto  Anacin     Equagesic   Methocarbamol   Salsalate  Anaprox    Etodolac   Midol     Sine-aid  Ansaid     Excedrin   Mobic     Sine-off  Arthritis Pain formula   Feldene   Motrin     Sominex  Ascriptin    Fenoprofen   Nabumetone    Stanback  Aspergum    Feverfew   Nalfon     Sulindac  Aspirin     Florinal   Naprosyn    Talwin Compound  Bela     Flurbiprofen   Naproxen    Ticlid  BC Powders    Genpril    Norgesic    iclopidine  Bufferin    Goody's   Nuprin     Tolectin  Cataflam    Haltrin    Nyquil     Tolmetin  Clinoril     IBU    Orudis     Toradol  Clopidogrel    Ibuprofen   Oruvail     Trental  Coricidin    Indocin    Oxaprozin    Triclosan  Coumadin    Indomethacin   Pamprin    Vanquish  Darvon Compound   Ketoprofen   Pepto Bismol    Vitamin - E  Daypro     Ketorolac   Percodan    Voltaren  Diflunisal Kaopectate   Lovenox   Piroxicam    Warfarin    Diclofenac Lodine       Phenaphen    Xarelto  Eliquis       Enoxaparin

## 2023-01-04 NOTE — PROGRESS NOTES
Name: Erica Sanchez  YOB: 1945  Gender: male  MRN: [id number]    CC: Back Pain (LBP bilateral pain across the belt line)       HPI: This is a 65y.o. year old male who  has a past medical history of Arthritis, Avascular necrosis ([address] BPH (benign prostatic hyperplasia), CMT (Charcot-Lisa-Tooth disease), H/O tooth extraction, Hypertension, Impotence of organic origin, Nocturia, Osteoarthritis of hip, S/P prosthetic total arthroplasty of the hip, Skin cancer, Snores, Status post total knee replacement, left, and Status post total knee replacement, right. .  This is a gentleman who had a L3-5 laminectomy with Dr. Caldera Fat August 2017 for stenosis. He reports about 6 months of improvement of pain and being able to stand up straight after the surgery but the wife states that following that he seemed to walk forward flexed and really did not have great relief with the surgery. I had previously seen him prior to that surgery and he was diagnosed's with a spondylolisthesis L4-5 and stenosis L3-5. Over the course of the past year he has had worsening back pain pains across the back and he cannot stand up straight all the way. He has had left foot drop that is been present for 1 year. His podiatrist ordered an AFO. He also reports he was diagnosed with Charcot-Lisa-Tooth tooth syndrome. He presented to Dr. Lazaro Estrada with bilateral hip pain and check his total knee arthroplasties. There was concern that he had pain referred from the lumbar spine and x-rays and MRI scan were ordered.   History was obtained by patient and wife    This patient  has had lumbar surgery in the past.      AMB PAIN ASSESSMENT 1/4/2023   Location of Pain Back   Location Modifiers Right;Left   Severity of Pain 7   Quality of Pain Sharp;Dull;Aching   Duration of Pain Persistent   Frequency of Pain Constant   Aggravating Factors Standing;Walking   Limiting Behavior Yes   Relieving Factors Rest   Result of Injury No Work-Related Injury No   Are there other pain locations you wish to document? No            ROS/Meds/PSH/PMH/FH/SH: I personally reviewed the patient's collected intake data.   Below are the pertinents:    No Known Allergies      Current Outpatient Medications:     amLODIPine (NORVASC) 5 MG tablet, , Disp: , Rfl:     diclofenac (VOLTAREN) 75 MG EC tablet, , Disp: , Rfl:     lisinopril (PRINIVIL;ZESTRIL) 40 MG tablet, , Disp: , Rfl:     vitamin D3 (CHOLECALCIFEROL) 125 MCG (5000 UT) TABS tablet, Take 5,000 Units by mouth every 7 days, Disp: , Rfl:     docusate (COLACE, DULCOLAX) 100 MG CAPS, Take 100 mg by mouth daily, Disp: , Rfl:     finasteride (PROSCAR) 5 MG tablet, Take 5 mg by mouth, Disp: , Rfl:     hydroCHLOROthiazide (HYDRODIURIL) 25 MG tablet, Take 25 mg by mouth daily, Disp: , Rfl:     tadalafil (CIALIS) 5 MG tablet, TAKE 1 TABLET DAILY, Disp: , Rfl:     tamsulosin (FLOMAX) 0.4 MG capsule, Take 0.4 mg by mouth daily, Disp: , Rfl:     terazosin (HYTRIN) 10 MG capsule, Take 10 mg by mouth, Disp: , Rfl:     acetaminophen (TYLENOL) 500 MG tablet, Take 500-1,000 mg by mouth every 6 hours as needed (Patient not taking: Reported on 1/4/2023), Disp: , Rfl:     cyclobenzaprine (FLEXERIL) 10 MG tablet, Take 5 mg by mouth 3 times daily as needed (Patient not taking: Reported on 1/4/2023), Disp: , Rfl:     Past Surgical History:   Procedure Laterality Date    BACK SURGERY      Lumbar laminectomy- no hardware     OTHER SURGICAL HISTORY  2002    skin cancer removed from lip, plastic surgery afterward    OTHER SURGICAL HISTORY      skin cancer from the ear,left leg and behind the area    TONSILLECTOMY      TOTAL HIP ARTHROPLASTY Right 10/2011    total    TOTAL KNEE ARTHROPLASTY Right 03/06/2019       Patient Active Problem List   Diagnosis    Osteoarthritis    Osteoarthritis of hip    Osteoarthritis of right knee    Status post total knee replacement, left    Status post total knee replacement, right    S/P prosthetic total arthroplasty of the hip    HTN (hypertension)         Tobacco:  reports that he has never smoked. He has never used smokeless tobacco.  Alcohol:   Social History     Substance and Sexual Activity   Alcohol Use No        Physical Exam:   @VS1@   GENERAL:  Adult in no acute distress, well developed, well nourished Patient is appropriately conversant  MSK:  Examination of the lumbar spine reveals spinal tenderness    There is moderate tenderness to palpation along the spinous processes and paraspinal musculature. The patient ambulates with a forward flexed, assisted with a cane, and foot drop gait. ROM of bilateral hip(s) reveals no irritability. NEURO:  Cranial nerves grossly intact. No motor deficits. Straight leg testing is positive bilateral  Sensory testing reveals intact sensation to light touch and in the distribution of the L3-S1 dermatomes bilaterally  Ankle jerk is negative for clonus AFO is on left lower extremity. Reflexes   Right Left   Quadriceps (L4) 2 2   Achilles (S1) 2 2     Strength testing in the lower extremity reveals the following based on the 5 point grading scale:     HF (L2) H Ab (L5) KE (L3/4) ADF (L4) EHL (L5) A Ev (S1) APF (S1)   Right 5 5 5 5 5 5 5   Left 5 5 5 3- 5 5 5     PSYCH:  Alert and oriented X 3. Appropriate affect. Intact judgment and insight. Radiographic Studies:     AP, lateral and spot views of the lumbar spine:    Independently reviewed x-rays of the lumbar spine. he has multilevel degenerative changes with large osteophytes lateral osteophytes and anterior. There is a spondylolisthesis L4-5, grade 1. Multilevel degenerative disc changes. MRI Result (most recent): MRI LUMBAR SPINE WO CONTRAST 12/12/2022    Narrative  EXAMINATION: MRI LUMBAR SPINE WO CONTRAST 12/12/2022 2:14 PM    ACCESSION NUMBER: [id number]    COMPARISON: Lumbar spine MRI May 30th 2017.  Lumbar spine x-ray December 6, 2022    INDICATION: Low back pain, unspecified; Radiculopathy, lumbar region    TECHNIQUE: Multisequence, multiplanar MR images were obtained of the lumbar  spine without the administration of intravenous contrast.    FINDINGS:  NUMBERING: Suggestion of 6 nonrib-bearing lumbar-type vertebrae as correlated to  prior lumbar radiograph. Lowest disc space labeled L5-S1. SPINAL CORD: Normal conus. The conus medullaris terminates at the L1-L2 disc  level. BONES: Vertebral body height is maintained. Similar stepwise grade 1  retrolisthesis L1-L4. Similar grade 1 anterolisthesis L4-L5 and L5 on S1. Postsurgical changes of the posterior elements L3-4 and L4-5. Hemangioma within  the L4 vertebral body. Modic type III degenerative endplate changes U2-6. Soft tissues: Postsurgical changes of the posterior soft tissues. No discrete  evidence of soft tissue collection. Mild paraspinal muscular atrophy. 0.7 cm  synovial cyst extends into the right subarticular recess at L4-5. Level specific findings:    T12-L1: No canal or foraminal stenosis. L1-L2: Right greater than left foraminal disc protrusions. Moderate facet  ligamentum flavum hypertrophy. Mild spinal canal stenosis. Mild bilateral  foraminal stenosis. L2-L3: Grade 1 retrolisthesis with disc uncovering, diffuse disc bulge, central  annular fissure and bilateral foraminal extension. Moderate facet ligamentum  flavum hypertrophy. Mild spinal canal stenosis. Mild left greater than right  foraminal stenosis. L3-L4: Postsurgical changes from prior posterior decompression. Severe facet  ligamentum flavum hypertrophy. Grade 1 retrolisthesis with disc uncovering and  posterior disc osteophyte complex. Moderate to severe spinal canal stenosis with  near complete effacement of CSF, but improved from prior. Severe bilateral  foraminal stenosis. L4-L5: Grade 1 anterolisthesis with disc uncovering, diffuse disc bulge, and  bilateral foraminal extension.  Severe facet ligamentum flavum hypertrophy. Postsurgical changes of the posterior elements. Mild overall spinal canal  stenosis with effacement of the subarticular recesses. Severe left and moderate  to severe right foraminal stenosis. L5-S1: Grade 1 anterolisthesis with disc uncovering, diffuse disc bulge, and  bilateral foraminal extension. Severe facet and ligamentum flavum hypertrophy  with facet effusions. Mild spinal canal stenosis with effacement of the left  subarticular recess. Moderate bilateral foraminal stenosis. Impression  1. Postsurgical changes of the lumbar spine with decreased spinal canal  stenosis L3-4 and L4-5 canal, now moderate to severe and mild respectively. 2.  Similar appearance of advanced foraminal stenosis L3-4 and L4-5.  3.  Synovial cyst extends into the right subarticular recess at L4-5, new from  prior. I have independently reviewed the MRI scan of the lumbar spine. He has postsurgical changes L3-L5. There is Minimal stenosis L2-3, L3-4 is a level of a prior decompression and there is severe stenosis and severe bilateral foraminal stenosis. L4-5 also anterior listhesis there is been a prior laminectomy defect. There is severe central stenosis severe bilateral foraminal stenosis. L5-S1 there is facet arthropathy with lateral recess stenosis but no significant central stenosis. Assessment/Plan:         Diagnosis Orders   1. DDD (degenerative disc disease), lumbar  Ambulatory Referral to Spine Injection      2. Spondylolisthesis of lumbar region  Ambulatory Referral to Spine Injection      3. Lumbar stenosis with neurogenic claudication  Ambulatory Referral to Spine Injection      4. Foraminal stenosis of lumbar region  Ambulatory Referral to Spine Injection            This patient's clinical history and physical exam is consistent with neurogenic claudication which is due to advanced lumbar stenosis spondylolisthesis.  I discussed the natural history of lumbar stenosis in that it is a degenerative condition that is usually slowly progressive resulting in gradual loss of mobility. I reassured the patient that this is not a condition that typically predisposes him   to an acute paraplegia; however, the more neurologic deficits acquired and the longer they go untreated, the less likely he is to have neurological improvements after an operation. He understands that conservative treatments typically include physical therapy, oral and/or epidural steroids, pain management, and simple observation. And, that these treatments do not address the anatomical pinching of the spinal nerves, but rather help patients cope with the resulting symptoms. I also discussed potential surgical intervention if the symptoms fail to improve or there is a progressive neurologic deficit or conservative management has been exhausted. I am concerned about surgical intervention because of prior decompression and scar tissue and risk for nerve injury and dural tear. We did discuss that any further surgery would involve a lumbar fusion. 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 What I do recommend to help with the symptoms is trial of lumbar injection. I did review this with Dr. Maru Orona as well and she will try L5-S1 epidural steroid injection and see if she can get into  but because of the prior surgical decompression and scar tissue at this level it may be more advisable to attempt L5-S1 epidural.    I will also discussed this case with Dr. Malu Lechuga. - Injection: The patient will be referred for a lumbar steroid injection to help reduce the symptoms. The patient understands the risks including hyperglycemia, immunosuppression, meningitis, cerebrospinal fluid leak, epidural hematoma, and reaction to medication. The patient may benefit from additional injections depending on the response.  The injection should be a L5 S1 EUSEBIA        No orders of the defined types were placed in this encounter. Orders Placed This Encounter   Procedures    Ambulatory Referral to Spine Injection        5 This is a chronic illness with severe exacerbation and progression      Return for lumbar injection, lumbar injection recheck wtih HEATHER, 4 weeks after shot on Monday. Letitia Iyer PA-C  01/04/23      Over 50% of this visit of 45 minutes was spent reviewing prior medical records and imaging, current imaging, diagnosis, treatment options, patient counseling and/or patient cooridination of care. Elements of this note were created using speech recognition software. As such, errors of speech recognition may be present.

## 2023-01-09 NOTE — PROGRESS NOTES
Name: Curtis Chavez  YOB: 1945  Gender: male  MRN: [id number]        Interlaminar EUSEBIA Procedure Note      Procedure: L5-S1 interlaminar epidural steroid injection    Precautions: Curtis Chavez denies prior sensitivity to steroid, local anesthetic, iodine, or shellfish. Consent:  Consent was obtained prior to the procedure. The procedure was discussed at length with Curtis Chavez. He was given the opportunity to ask questions regarding the procedure and its associated risks. In addition to the potential risks associated with the procedure itself, the patient was informed both verbally and in writing of potential side effects of the use glucocorticoids. The patient appeared to comprehend the informed consent and desired to have the procedure performed, and informed consent was signed. He was placed in a prone position on the fluoroscopy table and the skin was prepped and draped in a routine sterile fashion. The areas to be injected were each anesthetized with 1 ml of 1% Lidocaine. A 22 gauge 3.5 inch spinal needle was carefully advanced under fluoroscopic guidance to the L5-S1 interlaminar space. 0.5 ml of 70% of Omnipaque was injected to confirm proper needle placement and absence of subdural or vascular flow Once proper placement was confirmed, 2ml of sterile water and 80 mg of depomedrol were injected through the spinal needle. Fluoroscopic guidance was used intermittently over a 10-minute period to insure proper needle placement and his safety. A hard copy of the fluoroscopic image has been placed in his chart and is saved on the C-arm hard drive. He was monitored for 30 minutes after the procedure and discharged home in a stable fashion with a routine follow up. Procedural Diagnosis:     ICD-10-CM    1.  DDD (degenerative disc disease), lumbar  M51.36 XR INJ SPINE THER SUBST LUM/SAC W IMG     methylPREDNISolone acetate (DEPO-MEDROL) injection 80 mg 2. Spondylolisthesis of lumbar region  M43.16 XR INJ SPINE THER SUBST LUM/SAC W IMG     methylPREDNISolone acetate (DEPO-MEDROL) injection 80 mg      3.  Lumbar stenosis with neurogenic claudication  M48.062 XR INJ SPINE THER SUBST LUM/SAC W IMG     methylPREDNISolone acetate (DEPO-MEDROL) injection 80 mg         Bina Velazquez MD  01/11/23

## 2023-01-11 ENCOUNTER — OFFICE VISIT (OUTPATIENT)
Dept: ORTHOPEDIC SURGERY | Age: 78
End: 2023-01-11

## 2023-01-11 DIAGNOSIS — M48.062 LUMBAR STENOSIS WITH NEUROGENIC CLAUDICATION: ICD-10-CM

## 2023-01-11 DIAGNOSIS — M51.36 DDD (DEGENERATIVE DISC DISEASE), LUMBAR: Primary | ICD-10-CM

## 2023-01-11 DIAGNOSIS — M43.16 SPONDYLOLISTHESIS OF LUMBAR REGION: ICD-10-CM

## 2023-01-11 RX ORDER — METHYLPREDNISOLONE ACETATE 80 MG/ML
80 INJECTION, SUSPENSION INTRA-ARTICULAR; INTRALESIONAL; INTRAMUSCULAR; SOFT TISSUE ONCE
Status: COMPLETED | OUTPATIENT
Start: 2023-01-11 | End: 2023-01-11

## 2023-01-11 RX ADMIN — METHYLPREDNISOLONE ACETATE 80 MG: 80 INJECTION, SUSPENSION INTRA-ARTICULAR; INTRALESIONAL; INTRAMUSCULAR; SOFT TISSUE at 15:35

## 2023-01-30 ENCOUNTER — OFFICE VISIT (OUTPATIENT)
Dept: UROLOGY | Age: 78
End: 2023-01-30
Payer: MEDICARE

## 2023-01-30 DIAGNOSIS — N40.0 BENIGN PROSTATIC HYPERPLASIA WITHOUT LOWER URINARY TRACT SYMPTOMS: Primary | ICD-10-CM

## 2023-01-30 LAB
BILIRUBIN, URINE, POC: NEGATIVE
BLOOD URINE, POC: NORMAL
GLUCOSE URINE, POC: NEGATIVE
KETONES, URINE, POC: NEGATIVE
LEUKOCYTE ESTERASE, URINE, POC: NEGATIVE
NITRITE, URINE, POC: NEGATIVE
PH, URINE, POC: 7 (ref 4.6–8)
PROTEIN,URINE, POC: 100
SPECIFIC GRAVITY, URINE, POC: 1.02 (ref 1–1.03)
URINALYSIS CLARITY, POC: NORMAL
URINALYSIS COLOR, POC: NORMAL
UROBILINOGEN, POC: NORMAL

## 2023-01-30 PROCEDURE — 1036F TOBACCO NON-USER: CPT | Performed by: UROLOGY

## 2023-01-30 PROCEDURE — G8427 DOCREV CUR MEDS BY ELIG CLIN: HCPCS | Performed by: UROLOGY

## 2023-01-30 PROCEDURE — 1123F ACP DISCUSS/DSCN MKR DOCD: CPT | Performed by: UROLOGY

## 2023-01-30 PROCEDURE — G8417 CALC BMI ABV UP PARAM F/U: HCPCS | Performed by: UROLOGY

## 2023-01-30 PROCEDURE — G8484 FLU IMMUNIZE NO ADMIN: HCPCS | Performed by: UROLOGY

## 2023-01-30 PROCEDURE — 81003 URINALYSIS AUTO W/O SCOPE: CPT | Performed by: UROLOGY

## 2023-01-30 PROCEDURE — 99213 OFFICE O/P EST LOW 20 MIN: CPT | Performed by: UROLOGY

## 2023-01-30 RX ORDER — TADALAFIL 5 MG/1
5 TABLET ORAL DAILY
Qty: 90 TABLET | Refills: 3 | Status: SHIPPED | OUTPATIENT
Start: 2023-01-30

## 2023-01-30 ASSESSMENT — ENCOUNTER SYMPTOMS
NAUSEA: 0
BACK PAIN: 0

## 2023-01-30 NOTE — PROGRESS NOTES
[facility]  [address] [place] [address] [address], [address] [facility]  [phone]    Nichelle Ricks  : 1945    Chief Complaint   Patient presents with    Follow-up          HPI     Nichelle Ricks is a 77 y.o.  male with a history of BPH/LUTS on Flomax/Finasteride/Cialis who was seen in 2022. Returns for annual follow up. Today, he reports doing well on his 3 medications. No hematuria, retention, incontinence or any other symptoms. Urgency/frequency/nocturia much improved (1 per night down from 4). No UTIs. We are no longer checking PSA due to age. PVRs in the past have all been normal. No other concerns today.           Past Medical History:   Diagnosis Date    Arthritis     Avascular necrosis ([address]     right hip    BPH (benign prostatic hyperplasia)     medication    CMT (Charcot-Lisa-Tooth disease)     Followed by Dr. Sanna Lei- neurology     H/O tooth extraction 2019    upper left; antibiotics x 7 days and pain med x 3 days    Hypertension     controlled with medication    Impotence of organic origin     Nocturia     Osteoarthritis of hip 10/19/2011    S/P prosthetic total arthroplasty of the hip 10/19/2011    Skin cancer     multiple bcc and scc removed     Snores     Status post total knee replacement, left 2019    Status post total knee replacement, right 3/6/2019     Past Surgical History:   Procedure Laterality Date    BACK SURGERY      Lumbar laminectomy- no hardware     OTHER SURGICAL HISTORY      skin cancer removed from lip, plastic surgery afterward    OTHER SURGICAL HISTORY      skin cancer from the ear,left leg and behind the area    TONSILLECTOMY      TOTAL HIP ARTHROPLASTY Right 10/2011    total    TOTAL KNEE ARTHROPLASTY Right 2019     Current Outpatient Medications   Medication Sig Dispense Refill    tadalafil (CIALIS) 5 MG tablet Take 1 tablet by mouth daily TAKE 1 TABLET DAILY 90 tablet 3    amLODIPine (NORVASC) 5 MG tablet diclofenac (VOLTAREN) 75 MG EC tablet       lisinopril (PRINIVIL;ZESTRIL) 40 MG tablet       acetaminophen (TYLENOL) 500 MG tablet Take 500-1,000 mg by mouth every 6 hours as needed      vitamin D3 (CHOLECALCIFEROL) 125 MCG (5000 UT) TABS tablet Take 5,000 Units by mouth every 7 days      cyclobenzaprine (FLEXERIL) 10 MG tablet Take 5 mg by mouth 3 times daily as needed      docusate (COLACE, DULCOLAX) 100 MG CAPS Take 100 mg by mouth daily      finasteride (PROSCAR) 5 MG tablet Take 5 mg by mouth      hydroCHLOROthiazide (HYDRODIURIL) 25 MG tablet Take 25 mg by mouth daily      tamsulosin (FLOMAX) 0.4 MG capsule Take 0.4 mg by mouth daily      terazosin (HYTRIN) 10 MG capsule Take 10 mg by mouth       No current facility-administered medications for this visit. No Known Allergies  Social History     Socioeconomic History    Marital status:      Spouse name: Not on file    Number of children: Not on file    Years of education: Not on file    Highest education level: Not on file   Occupational History    Not on file   Tobacco Use    Smoking status: Never    Smokeless tobacco: Never   Substance and Sexual Activity    Alcohol use: No    Drug use: No    Sexual activity: Not on file   Other Topics Concern    Not on file   Social History Narrative    Not on file     Social Determinants of Health     Financial Resource Strain: Not on file   Food Insecurity: Not on file   Transportation Needs: Not on file   Physical Activity: Not on file   Stress: Not on file   Social Connections: Not on file   Intimate Partner Violence: Not on file   Housing Stability: Not on file     Family History   Problem Relation Age of Onset    Cancer Father     Osteoarthritis Mother     Diabetes Father     Hypertension Other         gen fam hx       Review of Systems  Constitutional:   Negative for fever. GI:  Negative for nausea. Musculoskeletal:  Negative for back pain.     Urinalysis  UA - Dipstick  Results for orders placed or performed in visit on 01/30/23   AMB POC URINALYSIS DIP STICK AUTO W/O MICRO   Result Value Ref Range    Color (UA POC)      Clarity (UA POC)      Glucose, Urine, POC Negative Negative    Bilirubin, Urine, POC Negative Negative    KETONES, Urine, POC Negative Negative    Specific Gravity, Urine, POC 1.020 1.001 - 1.035    Blood (UA POC) Small Negative    pH, Urine, POC 7.0 4.6 - 8.0    Protein, Urine,  Negative    Urobilinogen, POC 0.2 mg/dL     Nitrite, Urine, POC Negative Negative    Leukocyte Esterase, Urine, POC Negative Negative       UA - Micro  WBC - 0  RBC - 0  Bacteria - 0  Epith - 0    There were no vitals taken for this visit. GENERAL: No acute distress, Awake, Alert, Oriented X 3, Gait normal  CARDIAC: regular rate and rhythm  CHEST AND LUNG: Easy work of breathing, clear to auscultation bilaterally, no cyanosis  ABDOMEN: soft, non tender, non-distended, positive bowel sounds, no organomegaly, no palpable masses, no guarding, no rebound tenderness  SKIN: No rash, no erythema, no lacerations or abrasions, no ecchymosis  NEUROLOGIC: cranial nerves 2-12 grossly intact       Assessment and Plan    ICD-10-CM    1. Benign prostatic hyperplasia without lower urinary tract symptoms  N40.0 AMB POC URINALYSIS DIP STICK AUTO W/O MICRO     tadalafil (CIALIS) 5 MG tablet        BPH/LUTS:   Refilled meds with PCP. Doing well. F/U 1 year or sooner if needed. PRefers to check in annually with us given his history. I have spent 20 minutes today reviewing previous notes, test results and face to face with the patient as well as documenting. Nader Rajan M.D.     [facility]  [address], [address] [facility]  Phone: [phone]  Fax: [phone]

## 2023-02-06 ENCOUNTER — OFFICE VISIT (OUTPATIENT)
Dept: ORTHOPEDIC SURGERY | Age: 78
End: 2023-02-06
Payer: MEDICARE

## 2023-02-06 DIAGNOSIS — M47.816 FACET ARTHROPATHY, LUMBAR: ICD-10-CM

## 2023-02-06 DIAGNOSIS — M43.16 SPONDYLOLISTHESIS OF LUMBAR REGION: ICD-10-CM

## 2023-02-06 DIAGNOSIS — M51.36 DDD (DEGENERATIVE DISC DISEASE), LUMBAR: Primary | ICD-10-CM

## 2023-02-06 DIAGNOSIS — M48.062 LUMBAR STENOSIS WITH NEUROGENIC CLAUDICATION: ICD-10-CM

## 2023-02-06 DIAGNOSIS — M48.061 FORAMINAL STENOSIS OF LUMBAR REGION: ICD-10-CM

## 2023-02-06 PROCEDURE — G8417 CALC BMI ABV UP PARAM F/U: HCPCS | Performed by: PHYSICIAN ASSISTANT

## 2023-02-06 PROCEDURE — G8427 DOCREV CUR MEDS BY ELIG CLIN: HCPCS | Performed by: PHYSICIAN ASSISTANT

## 2023-02-06 PROCEDURE — 1036F TOBACCO NON-USER: CPT | Performed by: PHYSICIAN ASSISTANT

## 2023-02-06 PROCEDURE — 99214 OFFICE O/P EST MOD 30 MIN: CPT | Performed by: PHYSICIAN ASSISTANT

## 2023-02-06 PROCEDURE — G8484 FLU IMMUNIZE NO ADMIN: HCPCS | Performed by: PHYSICIAN ASSISTANT

## 2023-02-06 PROCEDURE — 1123F ACP DISCUSS/DSCN MKR DOCD: CPT | Performed by: PHYSICIAN ASSISTANT

## 2023-02-06 NOTE — PATIENT INSTRUCTIONS
Spondylitis (Spinal Arthritis) and Facet Joint Syndrome  At each level in our spine, there is a single disc  the bones (vertebrae) in front of the spinal canal, and a pair of joints called facet joints joining the bones together behind the spinal canal. As we age, the spinal discs and facet joints can wear out and degenerate. Disc degeneration is the term used to describe the wearing out of the discs. Spondylosis is the term used to describe degeneration and arthritis of the facet joints. Degeneration of the spine is a normal aging process, and in most cases spinal arthritis does not cause significant symptoms. However, for some people, arthritic facet joints can cause significant pain. Back or neck pain resulting from arthritic or inflamed facet joints is a condition called “facet joint syndrome”.        Symptoms  Back pain with radiation into hips and buttocks or neck pain with radiation into the shoulders  Natural History (“Doing Nothing”)  Not all arthritic facet joints cause symptoms   Back or neck pain may not be coming from the facet joints even if they are arthritic   Symptoms may resolve without treatment   Symptoms may be short-lived and infrequent   Rarely, patients develop more persistent and debilitating pain   Facet joints have very little ability to repair themselves or regenerate  Three Phases of Treatment:   Phase I - Non-Invasive Treatments   Phase II - Spinal Injections   Phase III - Surgery (rare for this condition unless radiculopathy is present)   Goals of Each Phase:   Relieve Pain   Improve Function  Treatment Options: Phase I - Non-Invasive Treatments  Physical Therapy and Regular Home Exercise   Neck or Back Strengthening   Flexibility and Stretching  Oral Medications   Steroids   Non-Steroid Anti-Inflammatories (NSAIDs)   Pain relievers   Muscle Relaxants  Ice and Heat   4-6 weeks of Phase I treatment before MRI and going to Phase II  Treatment Options: Phase II - Facet Joint  Injections and Facet Joint Nerve Ablation (RFNA)  Facet Joint Injections   Outpatient procedure   Done with x-ray guidance   Steroid is injected into the inflamed joint to reduce pain   May relieve symptoms, but will not reverse the joint arthritis   Successful injection may help confirm that pain is coming from the facet joints   Facet Joint Rhizotomy (Nerve Ablation) (Radiofrequency Nerve Ablation - RFNA)  The word rhizotomy means nerve destruction or nerve ablation. In facet rhizotomy, the tiny nerve fibers that carry pain signals from the facet joints to the brain are selectively destroyed using some form of energy. For patients who have had successful, but temporary relief of their back or neck pain from the facet injections, facet rhizotomy may provide more long-term relief. Facet rhizotomy is most commonly performed using a form of energy called radiofrequency (RF) energy. When done with RF, this technique is often called radiofrequency nerve ablation (RFNA). Treatment Options: Phase III - Surgery  Rarely needed for Spondylosis or Facet Joint Syndrome unless radiculopathy or stenosis is present   Back and neck pain from Spondylosis can be treated non-surgically in most cases    [facility]    MD Dale Jean-Baptiste MD  [id number] [place], [place] [address], SAUL Hendricks NP    Main Office  [address] [facility],[address], [address] [place] [address], [facility] [address]        For Appointments at either location, please call [phone]  Patient Education        Facet Joint Injection: Before Your Procedure  What is a facet joint injection? A facet joint injection is a shot of medicine to help with pain from arthritis. The injection goes into your neck or back. Where you get your shot depends on where your pain is. Facet joints connect your vertebrae to each other along the back of your spine.  Problems in these joints can cause long-term (chronic) pain in the neck or back. Numbing medicine is injected into the facet joint to see if that is the cause of your pain. If it does help your pain, your doctor may add a steroid medicine to the injection. Steroids reduce swelling and pain, but they don't always work. How do you prepare for the procedure? Procedures can be stressful. This information will help you understand what you can expect. And it will help you safely prepare for your procedure. Preparing for the procedure    Be sure you have someone to take you home. Anesthesia and pain medicine will make it unsafe for you to drive or get home on your own. Understand exactly what procedure is planned, along with the risks, benefits, and other options. If you take a medicine that prevents blood clots, your doctor may tell you to stop taking it before your procedure. Or your doctor may tell you to keep taking it. (These medicines include aspirin and other blood thinners.) Make sure that you understand exactly what your doctor wants you to do. Tell your doctor ALL the medicines, vitamins, supplements, and herbal remedies you take. Some may increase the risk of problems during your procedure. Your doctor will tell you if you should stop taking any of them before the procedure and how soon to do it. Make sure your doctor and the hospital have a copy of your advance directive. If you don't have one, you may want to prepare one. It lets others know your health care wishes. It's a good thing to have before any type of surgery or procedure. What happens on the day of the procedure? Follow the instructions exactly about when to stop eating and drinking. If you don't, your procedure may be canceled. If your doctor told you to take your medicines on the day of the procedure, take them with only a sip of water. Take a bath or shower before you come in for your procedure. Do not apply lotions, perfumes, deodorants, or nail polish.      Take off all jewelry and piercings. And take out contact lenses, if you wear them. At the hospital or surgery center   Bring a picture ID. You may get medicine that relaxes you or puts you in a light sleep. The area being worked on will be numb. The procedure will take 10 to 30 minutes. When should you call your doctor? You have questions or concerns. You don't understand how to prepare for your procedure. You become ill before the procedure (such as fever, flu, or a cold). You need to reschedule or have changed your mind about having the procedure. Current as of: March 9, 2022               Content Version: 13.5  © 2006-2022 [employer]. Care instructions adapted under license by [facility] ([facility]). If you have questions about a medical condition or this instruction, always ask your healthcare professional. [address] any warranty or liability for your use of this information.

## 2023-02-06 NOTE — PROGRESS NOTES
Name: Cody Jackson  YOB: 1945  Gender: male  MRN: [id number]    CC: Back Pain (Follow up after injection)       HPI: This is a 78 y.o. year old male who  has a past medical history of Arthritis, Avascular necrosis (HCC), BPH (benign prostatic hyperplasia), CMT (Charcot-Lisa-Tooth disease), H/O tooth extraction, Hypertension, Impotence of organic origin, Nocturia, Osteoarthritis of hip, S/P prosthetic total arthroplasty of the hip, Skin cancer, Snores, Status post total knee replacement, left, and Status post total knee replacement, right..  This is a gentleman who had a L3-5 laminectomy with Dr. Pang August 2017 for stenosis.  He reports about 6 months of improvement of pain and being able to stand up straight after the surgery but the wife states that following that he seemed to walk forward flexed and really did not have great relief with the surgery.  I had previously seen him prior to that surgery and he was diagnosed's with a spondylolisthesis L4-5 and stenosis L3-5.  Over the course of the past year he has had worsening back pain pains across the back and he cannot stand up straight all the way.  He has had left foot drop that is been present for 1 year.  His podiatrist ordered an AFO.  He also reports he was diagnosed with Charcot-Lisa-Tooth tooth syndrome.    He presented to Dr. Donaldson with bilateral hip pain and check his total knee arthroplasties.  There was concern that he had pain referred from the lumbar spine and x-rays and MRI scan were ordered. MRI reveals He has postsurgical changes L3-L5.  There is Minimal stenosis L2-3, L3-4 is a level of a prior decompression and there is severe stenosis and severe bilateral foraminal stenosis.  L4-5 also anterior listhesis there is been a prior laminectomy defect.  There is severe central stenosis severe bilateral foraminal stenosis.  L5-S1 there is facet arthropathy with lateral recess stenosis but no significant central  stenosis. We referred him for lumbar EUSEBIA. This was done L5-S1 because it was very difficult to assess L3-4 and 4 5 due to the prior surgery and degenerative changes. Patient reports severe relief from this injection. Pain is mostly across the back it is worse with standing up straight. There is axial loading pain. He is not getting a lot of pain into the legs at this time. AMB PAIN ASSESSMENT 2/6/2023   Location of Pain -   Location Modifiers -   Severity of Pain 7   Quality of Pain -   Duration of Pain -   Frequency of Pain -   Aggravating Factors -   Limiting Behavior -   Relieving Factors -   Result of Injury -   Work-Related Injury -   Are there other pain locations you wish to document? -            ROS/Meds/PSH/PMH/FH/SH: I personally reviewed the patient's collected intake data.   Below are the pertinents:    No Known Allergies      Current Outpatient Medications:     tadalafil (CIALIS) 5 MG tablet, Take 1 tablet by mouth daily TAKE 1 TABLET DAILY, Disp: 90 tablet, Rfl: 3    amLODIPine (NORVASC) 5 MG tablet, , Disp: , Rfl:     diclofenac (VOLTAREN) 75 MG EC tablet, , Disp: , Rfl:     lisinopril (PRINIVIL;ZESTRIL) 40 MG tablet, , Disp: , Rfl:     acetaminophen (TYLENOL) 500 MG tablet, Take 500-1,000 mg by mouth every 6 hours as needed, Disp: , Rfl:     vitamin D3 (CHOLECALCIFEROL) 125 MCG (5000 UT) TABS tablet, Take 5,000 Units by mouth every 7 days, Disp: , Rfl:     cyclobenzaprine (FLEXERIL) 10 MG tablet, Take 5 mg by mouth 3 times daily as needed, Disp: , Rfl:     docusate (COLACE, DULCOLAX) 100 MG CAPS, Take 100 mg by mouth daily, Disp: , Rfl:     finasteride (PROSCAR) 5 MG tablet, Take 5 mg by mouth, Disp: , Rfl:     hydroCHLOROthiazide (HYDRODIURIL) 25 MG tablet, Take 25 mg by mouth daily, Disp: , Rfl:     tamsulosin (FLOMAX) 0.4 MG capsule, Take 0.4 mg by mouth daily, Disp: , Rfl:     terazosin (HYTRIN) 10 MG capsule, Take 10 mg by mouth, Disp: , Rfl:     Past Surgical History:   Procedure Laterality Date    BACK SURGERY      Lumbar laminectomy- no hardware     OTHER SURGICAL HISTORY  2002    skin cancer removed from lip, plastic surgery afterward    OTHER SURGICAL HISTORY      skin cancer from the ear,left leg and behind the area    [address] Right 10/2011    total    TOTAL KNEE ARTHROPLASTY Right 03/06/2019       Patient Active Problem List   Diagnosis    Osteoarthritis    Osteoarthritis of hip    Osteoarthritis of right knee    Status post total knee replacement, left    Status post total knee replacement, right    S/P prosthetic total arthroplasty of the hip    HTN (hypertension)         Tobacco:  reports that he has never smoked. He has never used smokeless tobacco.  Alcohol:   Social History     Substance and Sexual Activity   Alcohol Use No        Physical Exam:   @VS1@   GENERAL:  Adult in no acute distress, well developed, well nourished Patient is appropriately conversant  MSK:  Examination of the lumbar spine reveals spinal tenderness    There is moderate tenderness to palpation along the spinous processes and paraspinal musculature. The patient ambulates with a forward flexed, assisted with a cane, and foot drop gait. ROM of bilateral hip(s) reveals no irritability. NEURO:  Cranial nerves grossly intact. No motor deficits. Straight leg testing is positive bilateral  Sensory testing reveals intact sensation to light touch and in the distribution of the L3-S1 dermatomes bilaterally  Ankle jerk is negative for clonus AFO is on left lower extremity. Reflexes   Right Left   Quadriceps (L4) 2 2   Achilles (S1) 2 2     Strength testing in the lower extremity reveals the following based on the 5 point grading scale:     HF (L2) H Ab (L5) KE (L3/4) ADF (L4) EHL (L5) A Ev (S1) APF (S1)   Right 5 5 5 5 5 5 5   Left 5 5 5 3- 5 5 5     PSYCH:  Alert and oriented X 3. Appropriate affect. Intact judgment and insight.          Radiographic Studies:     AP, lateral and spot views of the lumbar spine:    Independently reviewed x-rays of the lumbar spine. he has multilevel degenerative changes with large osteophytes lateral osteophytes and anterior. There is a spondylolisthesis L4-5, grade 1. Multilevel degenerative disc changes. MRI Result (most recent): MRI LUMBAR SPINE WO CONTRAST 12/12/2022    Narrative  EXAMINATION: MRI LUMBAR SPINE WO CONTRAST 12/12/2022 2:14 PM    ACCESSION NUMBER: [id number]    COMPARISON: Lumbar spine MRI May 30th 2017. Lumbar spine x-ray December 6, 2022    INDICATION: Low back pain, unspecified; Radiculopathy, lumbar region    TECHNIQUE: Multisequence, multiplanar MR images were obtained of the lumbar  spine without the administration of intravenous contrast.    FINDINGS:  NUMBERING: Suggestion of 6 nonrib-bearing lumbar-type vertebrae as correlated to  prior lumbar radiograph. Lowest disc space labeled L5-S1. SPINAL CORD: Normal conus. The conus medullaris terminates at the L1-L2 disc  level. BONES: Vertebral body height is maintained. Similar stepwise grade 1  retrolisthesis L1-L4. Similar grade 1 anterolisthesis L4-L5 and L5 on S1. Postsurgical changes of the posterior elements L3-4 and L4-5. Hemangioma within  the L4 vertebral body. Modic type III degenerative endplate changes Y6-0. Soft tissues: Postsurgical changes of the posterior soft tissues. No discrete  evidence of soft tissue collection. Mild paraspinal muscular atrophy. 0.7 cm  synovial cyst extends into the right subarticular recess at L4-5. Level specific findings:    T12-L1: No canal or foraminal stenosis. L1-L2: Right greater than left foraminal disc protrusions. Moderate facet  ligamentum flavum hypertrophy. Mild spinal canal stenosis. Mild bilateral  foraminal stenosis. L2-L3: Grade 1 retrolisthesis with disc uncovering, diffuse disc bulge, central  annular fissure and bilateral foraminal extension. Moderate facet ligamentum  flavum hypertrophy. Mild spinal canal stenosis. Mild left greater than right  foraminal stenosis. L3-L4: Postsurgical changes from prior posterior decompression. Severe facet  ligamentum flavum hypertrophy. Grade 1 retrolisthesis with disc uncovering and  posterior disc osteophyte complex. Moderate to severe spinal canal stenosis with  near complete effacement of CSF, but improved from prior. Severe bilateral  foraminal stenosis. L4-L5: Grade 1 anterolisthesis with disc uncovering, diffuse disc bulge, and  bilateral foraminal extension. Severe facet ligamentum flavum hypertrophy. Postsurgical changes of the posterior elements. Mild overall spinal canal  stenosis with effacement of the subarticular recesses. Severe left and moderate  to severe right foraminal stenosis. L5-S1: Grade 1 anterolisthesis with disc uncovering, diffuse disc bulge, and  bilateral foraminal extension. Severe facet and ligamentum flavum hypertrophy  with facet effusions. Mild spinal canal stenosis with effacement of the left  subarticular recess. Moderate bilateral foraminal stenosis. Impression  1. Postsurgical changes of the lumbar spine with decreased spinal canal  stenosis L3-4 and L4-5 canal, now moderate to severe and mild respectively. 2.  Similar appearance of advanced foraminal stenosis L3-4 and L4-5.  3.  Synovial cyst extends into the right subarticular recess at L4-5, new from  prior. I have independently reviewed the MRI scan of the lumbar spine. He has postsurgical changes L3-L5. There is Minimal stenosis L2-3, L3-4 is a level of a prior decompression and there is severe stenosis and severe bilateral foraminal stenosis. L4-5 also anterior listhesis there is been a prior laminectomy defect. There is severe central stenosis severe bilateral foraminal stenosis. L5-S1 there is facet arthropathy with lateral recess stenosis but no significant central stenosis. Assessment/Plan:         Diagnosis Orders   1.  DDD (degenerative disc disease), lumbar        2. Facet arthropathy, lumbar        3. Foraminal stenosis of lumbar region        4. Spondylolisthesis of lumbar region        5. Lumbar stenosis with neurogenic claudication          Patient did not perceive relief with lumbar EUSEBIA. He does have quite severe stenosis. He also has a very bulky facet arthropathy. Pain is more axial loading and with standing up straight. We also discussed the role of lumbar facet injections to see if this would provide any benefit. He would be interested in that. If so, possible candidate for rhizotomy. Have also reviewed the case with Dr. Geovani Adair. Stenosis is severe. Surgery indicated would likely be L3-5 revision laminectomy and TLIF. High risk for dural tears with the prior surgery. He did not recommend proceeding. The patient would like to follow-up with Dr. Sj Veliz as well for his opinion. He was his previous surgeon. Refer him to Dr. Leo Flores for facet injections as an eval and treat. - Injection: The patient will be referred for a lumbar steroid injection to help reduce the symptoms. The patient understands the risks including hyperglycemia, immunosuppression, meningitis, cerebrospinal fluid leak, epidural hematoma, and reaction to medication. The patient may benefit from additional injections depending on the response. The injection should be a Bilateral L4-S1 Facet injections. No orders of the defined types were placed in this encounter. No orders of the defined types were placed in this encounter. 5 This is a chronic illness with severe exacerbation and progression      Return for lumbar injection with JPM, eval/treat appt. with Dr. Leo Flores. Letitia Iyer PA-C  02/06/23      Over 50% of this visit of 45 minutes was spent reviewing prior medical records and imaging, current imaging, diagnosis, treatment options, patient counseling and/or patient cooridination of care.         Elements of this note were created using speech recognition software. As such, errors of speech recognition may be present.

## 2023-02-06 NOTE — LETTER
Muriel Cushing AMY HUNT  1945  MRN [id number]                                              ROOM NUMBER______      Radiographic Studies:    Cervical MRI      Thoracic MRI         Lumbar MRI          Pelvis MRI        CONTRAST    CT Myelogram: _______________   NCS/EMG ________________ ( UE  /  LE )     MRI of ___________________          Other: ____________________      Injections:    KNEE    HIP  Depomedrol _____ mg Euflexxa _____    _______________ TFESI/SNRB  _______________ SI Joint  _______________ EUSEBIA    _______________ Facet  _______________Piriformis/ Sciatica      Medications:    Oral Steroids _______________  NSAIDS _______________    Muscle Relaxers _______________  Neurontin/Lyrica _______________    Pain Medicine _______________  Other _______________                       Physical Therapy:    Lumbar     Thoracic      Cervical     Hip       Knee       Shoulder               Traction          Ultrasound          Dry Needling      Referral:    Pain referral:  [facility]   [facility]   Other: ______________________________    Follow-up/ Refer__________________________________________________    Authorization to hold blood thinners:___________________________________

## 2023-02-14 ENCOUNTER — OFFICE VISIT (OUTPATIENT)
Dept: ORTHOPEDIC SURGERY | Age: 78
End: 2023-02-14

## 2023-02-14 DIAGNOSIS — M48.061 FORAMINAL STENOSIS OF LUMBAR REGION: ICD-10-CM

## 2023-02-14 DIAGNOSIS — M51.36 DDD (DEGENERATIVE DISC DISEASE), LUMBAR: Primary | ICD-10-CM

## 2023-02-14 DIAGNOSIS — M43.16 SPONDYLOLISTHESIS OF LUMBAR REGION: ICD-10-CM

## 2023-02-14 DIAGNOSIS — M48.062 LUMBAR STENOSIS WITH NEUROGENIC CLAUDICATION: ICD-10-CM

## 2023-02-14 RX ORDER — TRIAMCINOLONE ACETONIDE 40 MG/ML
40 INJECTION, SUSPENSION INTRA-ARTICULAR; INTRAMUSCULAR ONCE
Status: COMPLETED | OUTPATIENT
Start: 2023-02-14 | End: 2023-02-14

## 2023-02-14 RX ADMIN — TRIAMCINOLONE ACETONIDE 40 MG: 40 INJECTION, SUSPENSION INTRA-ARTICULAR; INTRAMUSCULAR at 13:55

## 2023-02-14 NOTE — PROGRESS NOTES
Name: Prabha Wilcox  YOB: 1945  Gender: male  MRN: [id number]    Procedure: Bilateral  L4-L5 and L5-S1 facet joint injections     Precautions: Prabha Wilcox deniesprior sensitivity to steroid, local anesthetic, iodine, or shellfish. The procedure was discussed at length with her and informed consent was signed and placed in the chart. She was placed in a prone position on the fluoroscopy table and the skin was prepped and draped in a routine sterile fashion. The areas to be injected were each anesthetized with 1% lidocaine. Next, a 25-gauge 3.5 inch spinal needle was carefully advanced under fluoroscopic guidance to the rightL4 - L5 facet joint. Aspiration was negative. Once proper placement was confirmed, 1 ml of 0.25% Marcaine and  0.5 mL 40 mg/mL kenalog were injected through the spinal needle. The above procedure was then repeated through the rightL5 - S1, and left L4 - L5, and left L5 - S1 facet joints. Fluoroscopic guidance was used intermittently over a 10-minute period to insure proper needle placement and his safety. A hard copy of the fluoroscopic images has been placed in his chart and is saved on the C-arm hard drive. He was monitored for 30 minutes after the procedure and discharged home in a stable fashion with a routine follow up. Procedural Diagnosis:     ICD-10-CM    1. DDD (degenerative disc disease), lumbar  M51.36 FL INJ LUMB/SAC FACET SINGLE LEVEL     XR INJ FACET LUMB SACRAL 2ND LVL     triamcinolone acetonide (KENALOG-40) injection 40 mg      2. Foraminal stenosis of lumbar region  M48.061 FL INJ LUMB/SAC FACET SINGLE LEVEL     XR INJ FACET LUMB SACRAL 2ND LVL     triamcinolone acetonide (KENALOG-40) injection 40 mg      3. Spondylolisthesis of lumbar region  M43.16 FL INJ LUMB/SAC FACET SINGLE LEVEL     XR INJ FACET LUMB SACRAL 2ND LVL     triamcinolone acetonide (KENALOG-40) injection 40 mg      4.  Lumbar stenosis with neurogenic claudication  M48.062 FL INJ LUMB/SAC FACET SINGLE LEVEL     XR INJ FACET LUMB SACRAL 2ND LVL     triamcinolone acetonide (KENALOG-40) injection 40 mg           Parveen Cuba MD  02/14/23

## 2023-02-24 ENCOUNTER — OFFICE VISIT (OUTPATIENT)
Dept: ORTHOPEDIC SURGERY | Age: 78
End: 2023-02-24

## 2023-02-24 DIAGNOSIS — M51.36 DISC DEGENERATION, LUMBAR: ICD-10-CM

## 2023-02-24 DIAGNOSIS — M43.10 ACQUIRED SPONDYLOLISTHESIS: ICD-10-CM

## 2023-02-24 DIAGNOSIS — M47.816 LUMBAR SPONDYLOSIS: Primary | ICD-10-CM

## 2023-02-24 DIAGNOSIS — M54.16 LUMBAR RADICULOPATHY: ICD-10-CM

## 2023-02-24 DIAGNOSIS — M48.062 SPINAL STENOSIS OF LUMBAR REGION WITH NEUROGENIC CLAUDICATION: ICD-10-CM

## 2023-02-24 NOTE — PROGRESS NOTES
[facility]  Consultation Note  Virtual/Telephone Visit     Patient ID:  Name: Howard Mccullough  MRN: [id number]  AGE: 77 y.o.  : 1945    Date of Consultation:  2023    CC:   Chief Complaint   Patient presents with    Back Pain         HPI:  Today's visit was performed through a virtual visit with live audio and video feed. The patient was in the office with his wife in [place]. I was at home. No other persons were present. Verbal informed consent was obtained prior to today's visit, which lasted 17 minutes and included evaluation and management of symptoms, review of imaging, review of previous treatments, and discussion of treatment options. Please see the note below for more detailed information regarding today's visit. Mr. Stan Acuña is a 67-year-old man who presents today for evaluation of low back pain. He saw our surgical spine team.  Dr. Gage Knows evaluated his scan and felt that the surgery to help address his lumbar spine issues would be quite extensive. The patient has had prior surgery with Dr. Marybeth Lee and is awaiting reevaluation with him. He underwent interlaminar L5-S1 epidural steroid injection without any relief. He then underwent bilateral L4-5-S1 facet joint injection 2023. It has helped some, more than the epidural.  The pain is aggravated with walking, standing, being on his feet. It is alleviated with sitting down. The pain does not awaken him at night. MRI lumbar spine was ordered by another provider and performed 2022. He had evidence of prior surgery L3-4-5. He has moderate to severe central stenosis at L3-4. He has advanced bilateral neuroforaminal narrowing L3-4-5. He has advanced, multilevel degenerative change of the lumbar spine.      Past Medical History Includes:   Past Medical History:   Diagnosis Date    Arthritis     Avascular necrosis ([address]     right hip    BPH (benign prostatic hyperplasia)     medication    CMT (Charcot-Lisa-Tooth disease)     Followed by Dr. Malvin Roque- neurology     H/O tooth extraction 05/23/2019    upper left; antibiotics x 7 days and pain med x 3 days    Hypertension     controlled with medication    Impotence of organic origin     Nocturia     Osteoarthritis of hip 10/19/2011    S/P prosthetic total arthroplasty of the hip 10/19/2011    Skin cancer     multiple bcc and scc removed     Snores     Status post total knee replacement, left 5/31/2019    Status post total knee replacement, right 3/6/2019   ,   Past Surgical History:   Procedure Laterality Date    BACK SURGERY      Lumbar laminectomy- no hardware     OTHER SURGICAL HISTORY  2002    skin cancer removed from lip, plastic surgery afterward    OTHER SURGICAL HISTORY      skin cancer from the ear,left leg and behind the area    TONSILLECTOMY      TOTAL HIP ARTHROPLASTY Right 10/2011    total    TOTAL KNEE ARTHROPLASTY Right 03/06/2019     Family History:   Family History   Problem Relation Age of Onset    Cancer Father     Osteoarthritis Mother     Diabetes Father     Hypertension Other         gen fam hx      Social History:   Social History     Tobacco Use    Smoking status: Never    Smokeless tobacco: Never   Substance Use Topics    Alcohol use: No       ALLERGIES: No Known Allergies     Patient Medications    Current Outpatient Medications   Medication Sig    tadalafil (CIALIS) 5 MG tablet Take 1 tablet by mouth daily TAKE 1 TABLET DAILY    amLODIPine (NORVASC) 5 MG tablet     diclofenac (VOLTAREN) 75 MG EC tablet     lisinopril (PRINIVIL;ZESTRIL) 40 MG tablet     acetaminophen (TYLENOL) 500 MG tablet Take 500-1,000 mg by mouth every 6 hours as needed    vitamin D3 (CHOLECALCIFEROL) 125 MCG (5000 UT) TABS tablet Take 5,000 Units by mouth every 7 days    cyclobenzaprine (FLEXERIL) 10 MG tablet Take 5 mg by mouth 3 times daily as needed    docusate (COLACE, DULCOLAX) 100 MG CAPS Take 100 mg by mouth daily finasteride (PROSCAR) 5 MG tablet Take 5 mg by mouth    hydroCHLOROthiazide (HYDRODIURIL) 25 MG tablet Take 25 mg by mouth daily    tamsulosin (FLOMAX) 0.4 MG capsule Take 0.4 mg by mouth daily    terazosin (HYTRIN) 10 MG capsule Take 10 mg by mouth     No current facility-administered medications for this visit. ROS/Meds/PSH/PMH/FH/SH: I personally reviewed the patients standard intake form. No flowsheet data found. No results found for any visits on 02/24/23. Assessment and Plan:   No diagnosis found. No orders of the defined types were placed in this encounter. 4   This is a chronic illness/condition with exacerbation and progression  Treatment at this time: Discussed Injection therapy at length today, including risks, complications and alternative treatment options. The patient wishes to proceed. The injection will be performed as bilateral L4-5-S1 facet joint radiofrequency denervation. We discussed today that nonsurgical treatment options include medications, injections, and therapy. All of these options may help with pain but do not address the underlying anatomical problem. He has a confounding diagnosis of Charcot-Lisa-Tooth and dropfoot. We did discuss that that complicates the issue of whether or not lumbar surgery would provide significant relief, and he understands that. Studies ordered today: none    On this date 2/24/2023 I have spent 17 minutes reviewing previous notes, test results and face to face with the patient discussing the diagnosis and importance of compliance with the treatment plan as well as documenting on the day of the visit. Susan Ellis, was evaluated through a synchronous (real-time) audio-video encounter. The patient (or guardian if applicable) is aware that this is a billable service. Verbal consent to proceed has been obtained within the past 12 months.  The visit was conducted pursuant to the emergency declaration under the [facility], [address] [place] [address] waiver authority and the [employer] General Act. Patient identification was verified, and a caregiver was present when appropriate. The patient was located in a state where the provider was credentialed to provide care.        Robert Stoner MD  2/24/2023,  11:31 AM

## 2023-02-24 NOTE — LETTER
Fidel Spear  1945  ______________________________________________________________________    Radiographic Studies:    Cervical MRI/ Contrast        Thoracic MRI/ Contrast        Lumbar MRI/ Contrast    CT Myelogram __________________________________________________    NCS/EMG______________________________________________________    MRI of ________________________________________________________    Other__________________________________________________________      Injections:    _______________________________________________________________    Authorization to stop blood thinners________________________________      Medications:    Oral steroids___________________    Muscle Relaxers___________________    Pain medications_____________________    NSAIDS_____________________    Neuropathic pain medication_________________________________________      Physical Therapy:    Lumbar     Thoracic     Cervical       Other_______________________________      Follow up/ Referral:    Pain referral_______________________________________________________    Referral___________________________________________________________    Follow up_________________________________________________________    Handicapped Parking_______________________________________________    Other_____________________________________________________________

## 2023-02-27 ENCOUNTER — TELEPHONE (OUTPATIENT)
Dept: ORTHOPEDIC SURGERY | Age: 78
End: 2023-02-27

## 2023-02-27 NOTE — TELEPHONE ENCOUNTER
Pt  called and cx appt on 3/1/23 inj  needs to reschedule appt he had to take his wife to a  appt please return pt call

## 2023-03-03 ENCOUNTER — OFFICE VISIT (OUTPATIENT)
Dept: ORTHOPEDIC SURGERY | Age: 78
End: 2023-03-03

## 2023-03-03 DIAGNOSIS — M47.816 LUMBAR SPONDYLOSIS: Primary | ICD-10-CM

## 2023-03-03 DIAGNOSIS — M51.36 DISC DEGENERATION, LUMBAR: ICD-10-CM

## 2023-03-03 DIAGNOSIS — M47.816 FACET ARTHROPATHY, LUMBAR: ICD-10-CM

## 2023-03-03 DIAGNOSIS — M54.16 LUMBAR RADICULOPATHY: ICD-10-CM

## 2023-03-03 RX ORDER — TRIAMCINOLONE ACETONIDE 40 MG/ML
40 INJECTION, SUSPENSION INTRA-ARTICULAR; INTRAMUSCULAR ONCE
Status: COMPLETED | OUTPATIENT
Start: 2023-03-03 | End: 2023-03-03

## 2023-03-03 RX ADMIN — TRIAMCINOLONE ACETONIDE 40 MG: 40 INJECTION, SUSPENSION INTRA-ARTICULAR; INTRAMUSCULAR at 15:52

## 2023-03-03 NOTE — PROGRESS NOTES
Name: Marifer Lopez  YOB: 1945  Gender: male  MRN: [id number]    Procedure: Bilateral  L4-L5 and L5-S1 facet joint injections     Precautions: Marifer Lopez deniesprior sensitivity to steroid, local anesthetic, iodine, or shellfish. The procedure was discussed at length with her and informed consent was signed and placed in the chart. She was placed in a prone position on the fluoroscopy table and the skin was prepped and draped in a routine sterile fashion. The areas to be injected were each anesthetized with 1% lidocaine. Next, a 25-gauge 3.5 inch spinal needle was carefully advanced under fluoroscopic guidance to the rightL4 - L5 facet joint. Aspiration was negative. Once proper placement was confirmed, 1 ml of 0.25% Marcaine and  0.5 mL 40 mg/mL kenalog were injected through the spinal needle. The above procedure was then repeated through the rightL5 - S1, and left L4 - L5, and leftL5 - S1 facet joints. Fluoroscopic guidance was used intermittently over a 10-minute period to insure proper needle placement and his safety. A hard copy of the fluoroscopic images has been placed in his chart and is saved on the C-arm hard drive. He was monitored for 30 minutes after the procedure and discharged home in a stable fashion with a routine follow up. Procedural Diagnosis:     ICD-10-CM    1. Lumbar spondylosis  M47.816 FL INJ LUMB/SAC FACET SINGLE LEVEL     XR INJ FACET LUMB SACRAL 2ND LVL     triamcinolone acetonide (KENALOG-40) injection 40 mg     Walker w/ wheels ()      2. Disc degeneration, lumbar  M51.36 FL INJ LUMB/SAC FACET SINGLE LEVEL     XR INJ FACET LUMB SACRAL 2ND LVL     triamcinolone acetonide (KENALOG-40) injection 40 mg     Walker w/ wheels ()      3.  Lumbar radiculopathy  M54.16 FL INJ LUMB/SAC FACET SINGLE LEVEL     XR INJ FACET LUMB SACRAL 2ND LVL     triamcinolone acetonide (KENALOG-40) injection 40 mg     Walker w/ wheels () 4. Facet arthropathy, lumbar  M47.816 FL INJ LUMB/SAC FACET SINGLE LEVEL     XR INJ FACET LUMB SACRAL 2ND LVL     triamcinolone acetonide (KENALOG-40) injection 40 mg     Walker w/ laura ()           Elio Mcintyre MD  03/03/23

## 2023-03-22 NOTE — PROGRESS NOTES
Shift assessment completed. Pt is alert & oriented x4. Able to verbalize needs. Resting quietly with no distress noted. Dressing to right knee is clean, dry and intact. Ruffin Aver in place. Neurovascular and peripheral vascular checks WNL. Incentive Spirometry at bedside. Patient encouraged to use hourly x 10 repetitions. Patient voiding clear yellow urine without difficulty. Pain is being managed with Dilaudid with patient tolerating well. Bed low and locked. Call light within reach. Patient instructed to call for assistance. Pt verbalizes understanding. Will monitor. no

## 2023-04-03 ENCOUNTER — TELEPHONE (OUTPATIENT)
Dept: ORTHOPEDIC SURGERY | Age: 78
End: 2023-04-03

## 2024-02-29 ASSESSMENT — ENCOUNTER SYMPTOMS
ABDOMINAL PAIN: 0
SHORTNESS OF BREATH: 0
CONSTIPATION: 0
INDIGESTION: 0
HEARTBURN: 0
EYE PAIN: 0
WHEEZING: 0
SKIN LESIONS: 0
BACK PAIN: 0
EYE DISCHARGE: 0
NAUSEA: 0
DIARRHEA: 0
COUGH: 0
BLOOD IN STOOL: 0
VOMITING: 0

## 2024-03-01 ENCOUNTER — OFFICE VISIT (OUTPATIENT)
Dept: UROLOGY | Age: 79
End: 2024-03-01
Payer: MEDICARE

## 2024-03-01 DIAGNOSIS — N40.1 BENIGN PROSTATIC HYPERPLASIA WITH URINARY FREQUENCY: Primary | ICD-10-CM

## 2024-03-01 DIAGNOSIS — N40.0 BENIGN PROSTATIC HYPERPLASIA WITHOUT LOWER URINARY TRACT SYMPTOMS: ICD-10-CM

## 2024-03-01 DIAGNOSIS — R35.0 BENIGN PROSTATIC HYPERPLASIA WITH URINARY FREQUENCY: Primary | ICD-10-CM

## 2024-03-01 LAB
BILIRUBIN, URINE, POC: NEGATIVE
BLOOD URINE, POC: NORMAL
GLUCOSE URINE, POC: NEGATIVE
KETONES, URINE, POC: NEGATIVE
LEUKOCYTE ESTERASE, URINE, POC: NEGATIVE
NITRITE, URINE, POC: NEGATIVE
PH, URINE, POC: 6 (ref 4.6–8)
PROTEIN,URINE, POC: NEGATIVE
SPECIFIC GRAVITY, URINE, POC: 1.02 (ref 1–1.03)
URINALYSIS CLARITY, POC: NORMAL
URINALYSIS COLOR, POC: NORMAL
UROBILINOGEN, POC: NORMAL

## 2024-03-01 PROCEDURE — 1036F TOBACCO NON-USER: CPT | Performed by: UROLOGY

## 2024-03-01 PROCEDURE — G8421 BMI NOT CALCULATED: HCPCS | Performed by: UROLOGY

## 2024-03-01 PROCEDURE — 81003 URINALYSIS AUTO W/O SCOPE: CPT | Performed by: UROLOGY

## 2024-03-01 PROCEDURE — G8427 DOCREV CUR MEDS BY ELIG CLIN: HCPCS | Performed by: UROLOGY

## 2024-03-01 PROCEDURE — 1123F ACP DISCUSS/DSCN MKR DOCD: CPT | Performed by: UROLOGY

## 2024-03-01 PROCEDURE — 99213 OFFICE O/P EST LOW 20 MIN: CPT | Performed by: UROLOGY

## 2024-03-01 PROCEDURE — G8484 FLU IMMUNIZE NO ADMIN: HCPCS | Performed by: UROLOGY

## 2024-03-01 RX ORDER — TADALAFIL 5 MG/1
5 TABLET ORAL DAILY
Qty: 90 TABLET | Refills: 3 | Status: SHIPPED | OUTPATIENT
Start: 2024-03-01

## 2024-03-01 NOTE — PROGRESS NOTES
prostatic hyperplasia with urinary frequency  N40.1 AMB POC URINALYSIS DIP STICK AUTO W/O MICRO    R35.0       2. Benign prostatic hyperplasia without lower urinary tract symptoms  N40.0 tadalafil (CIALIS) 5 MG tablet          BPH/LUTS:   Refilled meds with PCP.  Just wants cialis 5 mg daily refill today.  Doing well.  F/U 1 year or sooner if needed.  PRefers to check in annually with us given his history.     I have spent 20 minutes today reviewing previous notes, test results and face to face with the patient as well as documenting.      Nader Larose M.D.    [facility]  [address] [address]  Phone: [phone]  Fax: [phone]

## 2025-02-28 ENCOUNTER — OFFICE VISIT (OUTPATIENT)
Dept: UROLOGY | Age: 80
End: 2025-02-28
Payer: MEDICARE

## 2025-02-28 DIAGNOSIS — R35.0 BENIGN PROSTATIC HYPERPLASIA WITH URINARY FREQUENCY: Primary | ICD-10-CM

## 2025-02-28 DIAGNOSIS — N40.0 BENIGN PROSTATIC HYPERPLASIA WITHOUT LOWER URINARY TRACT SYMPTOMS: ICD-10-CM

## 2025-02-28 DIAGNOSIS — N40.1 BENIGN PROSTATIC HYPERPLASIA WITH URINARY FREQUENCY: Primary | ICD-10-CM

## 2025-02-28 LAB
BILIRUBIN, URINE, POC: NEGATIVE
BLOOD URINE, POC: NORMAL
GLUCOSE URINE, POC: NEGATIVE MG/DL
KETONES, URINE, POC: NEGATIVE MG/DL
LEUKOCYTE ESTERASE, URINE, POC: NEGATIVE
NITRITE, URINE, POC: NEGATIVE
PH, URINE, POC: 6 (ref 4.6–8)
PROTEIN,URINE, POC: 300 MG/DL
SPECIFIC GRAVITY, URINE, POC: 1.03 (ref 1–1.03)
URINALYSIS CLARITY, POC: NORMAL
URINALYSIS COLOR, POC: NORMAL
UROBILINOGEN, POC: NORMAL MG/DL

## 2025-02-28 PROCEDURE — 1159F MED LIST DOCD IN RCRD: CPT | Performed by: UROLOGY

## 2025-02-28 PROCEDURE — 99213 OFFICE O/P EST LOW 20 MIN: CPT | Performed by: UROLOGY

## 2025-02-28 PROCEDURE — 1036F TOBACCO NON-USER: CPT | Performed by: UROLOGY

## 2025-02-28 PROCEDURE — G8421 BMI NOT CALCULATED: HCPCS | Performed by: UROLOGY

## 2025-02-28 PROCEDURE — 1123F ACP DISCUSS/DSCN MKR DOCD: CPT | Performed by: UROLOGY

## 2025-02-28 PROCEDURE — G8427 DOCREV CUR MEDS BY ELIG CLIN: HCPCS | Performed by: UROLOGY

## 2025-02-28 PROCEDURE — 81003 URINALYSIS AUTO W/O SCOPE: CPT | Performed by: UROLOGY

## 2025-02-28 RX ORDER — TADALAFIL 5 MG/1
5 TABLET ORAL DAILY
Qty: 90 TABLET | Refills: 3 | Status: CANCELLED | OUTPATIENT
Start: 2025-02-28

## 2025-02-28 RX ORDER — TADALAFIL 5 MG/1
5 TABLET ORAL DAILY
Qty: 90 TABLET | Refills: 3 | Status: SHIPPED | OUTPATIENT
Start: 2025-02-28

## (undated) DEVICE — 3M™ STERI-DRAPE™ INSTRUMENT POUCH 1018: Brand: STERI-DRAPE™

## (undated) DEVICE — KIT PREP FEM POST STBL SZ 6

## (undated) DEVICE — SYR LR LCK 1ML GRAD NSAF 30ML --

## (undated) DEVICE — 2000CC GUARDIAN II: Brand: GUARDIAN

## (undated) DEVICE — SOLUTION IV DEXTROSE/SALINE 5%-0.9% 500ML - 500ML

## (undated) DEVICE — MEDI-VAC YANKAUER SUCTION HANDLE W/BULBOUS TIP: Brand: CARDINAL HEALTH

## (undated) DEVICE — REM POLYHESIVE ADULT PATIENT RETURN ELECTRODE: Brand: VALLEYLAB

## (undated) DEVICE — 3000CC GUARDIAN II: Brand: GUARDIAN

## (undated) DEVICE — SOLUTION IV 1000ML 0.9% SOD CHL

## (undated) DEVICE — SUT ETHBND 2 30IN LR DA GRN --

## (undated) DEVICE — SYR 10ML LUER LOK 1/5ML GRAD --

## (undated) DEVICE — DRAPE,TOP,102X53,STERILE: Brand: MEDLINE

## (undated) DEVICE — INSTRUMENT ORTH SZ 7 KT FOR POST STBL TIB PREP TRIATHLON

## (undated) DEVICE — SUTURE VCRL SZ 1 L27IN ABSRB UD L36MM CP-1 1/2 CIR REV CUT J268H

## (undated) DEVICE — BANDAGE COMPR SELF ADH 5 YDX4 IN TAN STRL PREMIERPRO LF

## (undated) DEVICE — BLADE SAW PAT RMR PILT H 51MM --

## (undated) DEVICE — SUTURE STRATAFIX SYMMETRIC PDS + SZ 2-0 L18IN ABSRB VLT SXPP1A403

## (undated) DEVICE — SYR 50ML LR LCK 1ML GRAD NSAF --

## (undated) DEVICE — BIPOLAR SEALER 23-112-1 AQM 6.0: Brand: AQUAMANTYS ®

## (undated) DEVICE — INSTRUMENT ORTH SZ 7 FEM KT DISPOSABLE POST STBL

## (undated) DEVICE — IMPLANTABLE DEVICE
Type: IMPLANTABLE DEVICE | Site: KNEE | Status: NON-FUNCTIONAL
Removed: 2019-05-31

## (undated) DEVICE — T4 HOOD

## (undated) DEVICE — SUTURE ABSRB X-1 REV CUT 1/2 CIR 22MM UD BRAID 27IN SZ 3-0 J458H

## (undated) DEVICE — OSCILLATING TIP SAW CARTRIDGE: Brand: STRYKER PRECISION

## (undated) DEVICE — SYRINGE CATH TIP 50ML

## (undated) DEVICE — TRAY PREP DRY W/ PREM GLV 2 APPL 6 SPNG 2 UNDPD 1 OVERWRAP

## (undated) DEVICE — PACK PROCEDURE SURG TOT KNEE

## (undated) DEVICE — Z DISCONTINUED USE 2744636  DRESSING AQUACEL 14 IN ALG W3.5XL14IN POLYUR FLM CVR W/ HYDRCOLL

## (undated) DEVICE — BUTTON SWITCH PENCIL BLADE ELECTRODE, HOLSTER: Brand: EDGE

## (undated) DEVICE — (D)PREP SKN CHLRAPRP APPL 26ML -- CONVERT TO ITEM 371833

## (undated) DEVICE — HANDPIECE SET WITH COAXIAL HIGH FLOW TIP AND SUCTION TUBE: Brand: INTERPULSE

## (undated) DEVICE — X-LARGE COTTON GLOVE: Brand: DEROYAL

## (undated) DEVICE — SOLUTION IRRIG 3000ML 0.9% SOD CHL FLX CONT 0797208] ICU MEDICAL INC]

## (undated) DEVICE — DRAPE TWL SURG 16X26IN BLU ORB04] ALLCARE INC]

## (undated) DEVICE — SLIM BODY SKIN STAPLER: Brand: APPOSE ULC

## (undated) DEVICE — TRAY CATH 16F DRN BG LTX -- CONVERT TO ITEM 363158

## (undated) DEVICE — SUTURE PDS II SZ 1 L96IN ABSRB VLT TP-1 L65MM 1/2 CIR Z880G

## (undated) DEVICE — 3M™ IOBAN™ 2 ANTIMICROBIAL INCISE DRAPE 6650EZ: Brand: IOBAN™ 2